# Patient Record
Sex: FEMALE | Race: WHITE | Employment: UNEMPLOYED | URBAN - NONMETROPOLITAN AREA
[De-identification: names, ages, dates, MRNs, and addresses within clinical notes are randomized per-mention and may not be internally consistent; named-entity substitution may affect disease eponyms.]

---

## 2018-02-13 ENCOUNTER — APPOINTMENT (OUTPATIENT)
Dept: CT IMAGING | Age: 38
End: 2018-02-13
Payer: COMMERCIAL

## 2018-02-13 ENCOUNTER — HOSPITAL ENCOUNTER (EMERGENCY)
Age: 38
Discharge: LEFT BEFORE DISCHARGE | End: 2018-02-13
Payer: COMMERCIAL

## 2018-02-13 VITALS
BODY MASS INDEX: 24.91 KG/M2 | TEMPERATURE: 98.1 F | RESPIRATION RATE: 17 BRPM | HEART RATE: 83 BPM | DIASTOLIC BLOOD PRESSURE: 76 MMHG | OXYGEN SATURATION: 99 % | WEIGHT: 155 LBS | SYSTOLIC BLOOD PRESSURE: 120 MMHG | HEIGHT: 66 IN

## 2018-02-13 DIAGNOSIS — R30.0 DYSURIA: Primary | ICD-10-CM

## 2018-02-13 DIAGNOSIS — R10.9 FLANK PAIN: ICD-10-CM

## 2018-02-13 LAB
BACTERIA: NEGATIVE /HPF
BILIRUBIN URINE: ABNORMAL
BLOOD, URINE: NEGATIVE
CLARITY: CLEAR
COLOR: YELLOW
EPITHELIAL CELLS, UA: 6 /HPF (ref 0–5)
GLUCOSE URINE: NEGATIVE MG/DL
HCG(URINE) PREGNANCY TEST: NEGATIVE
HYALINE CASTS: 3 /HPF (ref 0–8)
KETONES, URINE: NEGATIVE MG/DL
LEUKOCYTE ESTERASE, URINE: ABNORMAL
NITRITE, URINE: NEGATIVE
PH UA: 6
PROTEIN UA: NEGATIVE MG/DL
RBC UA: 4 /HPF (ref 0–4)
SPECIFIC GRAVITY UA: 1.02
UROBILINOGEN, URINE: 1 E.U./DL
WBC UA: 7 /HPF (ref 0–5)

## 2018-02-13 PROCEDURE — 99284 EMERGENCY DEPT VISIT MOD MDM: CPT | Performed by: NURSE PRACTITIONER

## 2018-02-13 PROCEDURE — 81001 URINALYSIS AUTO W/SCOPE: CPT

## 2018-02-13 PROCEDURE — 99284 EMERGENCY DEPT VISIT MOD MDM: CPT

## 2018-02-13 PROCEDURE — 81025 URINE PREGNANCY TEST: CPT

## 2018-02-13 RX ORDER — IBUPROFEN 400 MG/1
400 TABLET ORAL EVERY 6 HOURS PRN
Qty: 30 TABLET | Refills: 0 | Status: ON HOLD | OUTPATIENT
Start: 2018-02-13 | End: 2019-04-11 | Stop reason: HOSPADM

## 2018-02-13 RX ORDER — PHENAZOPYRIDINE HYDROCHLORIDE 200 MG/1
200 TABLET, FILM COATED ORAL 3 TIMES DAILY PRN
Qty: 9 TABLET | Refills: 0 | Status: SHIPPED | OUTPATIENT
Start: 2018-02-13 | End: 2018-02-16

## 2018-02-13 RX ORDER — CEPHALEXIN 500 MG/1
500 CAPSULE ORAL 3 TIMES DAILY
Qty: 30 CAPSULE | Refills: 0 | Status: SHIPPED | OUTPATIENT
Start: 2018-02-13 | End: 2018-02-23

## 2018-02-13 ASSESSMENT — ENCOUNTER SYMPTOMS
RESPIRATORY NEGATIVE: 1
ABDOMINAL PAIN: 1
NAUSEA: 1
BACK PAIN: 1

## 2018-02-13 ASSESSMENT — PAIN DESCRIPTION - LOCATION: LOCATION: FLANK

## 2018-02-13 ASSESSMENT — PAIN DESCRIPTION - ORIENTATION: ORIENTATION: RIGHT

## 2018-02-13 ASSESSMENT — PAIN SCALES - GENERAL: PAINLEVEL_OUTOF10: 8

## 2018-02-14 NOTE — ED PROVIDER NOTES
[facility]  eMERGENCY dEPARTMENT eNCOUnter      Pt Name: Jordan Borja  MRN: [id number]  [place] 1980  Date of evaluation: 2/13/2018  Provider: Adrián Ramos, [address]       Chief Complaint   Patient presents with    Urinary Tract Infection         HISTORY OF PRESENT ILLNESS   (Location/Symptom, Timing/Onset, Context/Setting, Quality, Duration, Modifying Factors, Severity)  Note limiting factors. Jordan Borja is a 30 y.o. female who presents to the emergency department for evaluation of dysuria. Pt tells me that she has had urinary frequency and urgency over past 10 days. She relates that she thought that she had a uti and took three days of an old prescription of levaquin. She relates that over past 2 days she has had worsening right sided flank pain described as intermittent cramping type pain. She has had subjective fever associated with nausea. She has had no vomiting or diarrhea. She has had no abnormal vaginal discharge. HPI    Nursing Notes were reviewed. REVIEW OF SYSTEMS    (2-9 systems for level 4, 10 or more for level 5)     Review of Systems   Constitutional: Positive for fever. Respiratory: Negative. Cardiovascular: Negative. Gastrointestinal: Positive for abdominal pain and nausea. Genitourinary: Positive for decreased urine volume and dysuria. Negative for vaginal discharge. Musculoskeletal: Positive for back pain. All other systems reviewed and are negative. A complete review of systems was performed and is negative except as noted above in the HPI.        PAST MEDICAL HISTORY     Past Medical History:   Diagnosis Date    Bipolar disorder ([address]     Depression     Liver disease     Hep C    Neuropathy          SURGICAL HISTORY       Past Surgical History:   Procedure Laterality Date    APPENDECTOMY           CURRENT MEDICATIONS       Previous Medications    CYCLOBENZAPRINE (FLEXERIL) 10 MG TABLET    Take 1 tablet by mouth 3 times daily Efforts were made to edit the dictations but occasionally words are mis-transcribed.)              Kumar Moore, APRN  02/13/18 3092

## 2018-09-29 ENCOUNTER — HOSPITAL ENCOUNTER (EMERGENCY)
Age: 38
Discharge: HOME | End: 2018-09-29
Payer: COMMERCIAL

## 2018-09-29 VITALS
DIASTOLIC BLOOD PRESSURE: 68 MMHG | TEMPERATURE: 98.7 F | WEIGHT: 155 LBS | HEART RATE: 102 BPM | OXYGEN SATURATION: 97 % | SYSTOLIC BLOOD PRESSURE: 105 MMHG | RESPIRATION RATE: 16 BRPM | HEIGHT: 66 IN | BODY MASS INDEX: 24.91 KG/M2

## 2018-09-29 DIAGNOSIS — M54.32 SCIATICA OF LEFT SIDE: Primary | ICD-10-CM

## 2018-09-29 PROCEDURE — 99284 EMERGENCY DEPT VISIT MOD MDM: CPT | Performed by: NURSE PRACTITIONER

## 2018-09-29 PROCEDURE — 99282 EMERGENCY DEPT VISIT SF MDM: CPT

## 2018-09-29 PROCEDURE — 6360000002 HC RX W HCPCS: Performed by: NURSE PRACTITIONER

## 2018-09-29 PROCEDURE — 96372 THER/PROPH/DIAG INJ SC/IM: CPT

## 2018-09-29 RX ORDER — ORPHENADRINE CITRATE 30 MG/ML
60 INJECTION INTRAMUSCULAR; INTRAVENOUS ONCE
Status: COMPLETED | OUTPATIENT
Start: 2018-09-29 | End: 2018-09-29

## 2018-09-29 RX ORDER — KETOROLAC TROMETHAMINE 30 MG/ML
30 INJECTION, SOLUTION INTRAMUSCULAR; INTRAVENOUS ONCE
Status: COMPLETED | OUTPATIENT
Start: 2018-09-29 | End: 2018-09-29

## 2018-09-29 RX ORDER — PREDNISONE 1 MG/1
20 TABLET ORAL 2 TIMES DAILY
Status: DISCONTINUED | OUTPATIENT
Start: 2018-09-29 | End: 2018-09-29

## 2018-09-29 RX ORDER — PREDNISONE 20 MG/1
20 TABLET ORAL 2 TIMES DAILY
Qty: 10 TABLET | Refills: 0 | Status: SHIPPED | OUTPATIENT
Start: 2018-09-29 | End: 2018-10-04

## 2018-09-29 RX ORDER — DEXAMETHASONE SODIUM PHOSPHATE 10 MG/ML
10 INJECTION, SOLUTION INTRAMUSCULAR; INTRAVENOUS ONCE
Status: COMPLETED | OUTPATIENT
Start: 2018-09-29 | End: 2018-09-29

## 2018-09-29 RX ADMIN — ORPHENADRINE CITRATE 60 MG: 30 INJECTION INTRAMUSCULAR; INTRAVENOUS at 21:27

## 2018-09-29 RX ADMIN — DEXAMETHASONE SODIUM PHOSPHATE 10 MG: 10 INJECTION INTRAMUSCULAR; INTRAVENOUS at 21:28

## 2018-09-29 RX ADMIN — KETOROLAC TROMETHAMINE 30 MG: 30 INJECTION, SOLUTION INTRAMUSCULAR; INTRAVENOUS at 21:27

## 2018-09-29 RX ADMIN — Medication 1 MG: at 22:28

## 2018-09-29 ASSESSMENT — PAIN SCALES - GENERAL
PAINLEVEL_OUTOF10: 8
PAINLEVEL_OUTOF10: 8

## 2018-09-29 ASSESSMENT — ENCOUNTER SYMPTOMS: BACK PAIN: 1

## 2018-10-05 ENCOUNTER — HOSPITAL ENCOUNTER (INPATIENT)
Facility: HOSPITAL | Age: 38
LOS: 3 days | Discharge: HOME | End: 2018-10-08
Attending: UROLOGY | Admitting: UROLOGY

## 2018-10-05 ENCOUNTER — HOSPITAL ENCOUNTER (EMERGENCY)
Age: 38
Discharge: ANOTHER HOSPITAL | End: 2018-10-05
Attending: EMERGENCY MEDICINE
Payer: COMMERCIAL

## 2018-10-05 ENCOUNTER — APPOINTMENT (OUTPATIENT)
Dept: CT IMAGING | Age: 38
End: 2018-10-05
Payer: COMMERCIAL

## 2018-10-05 VITALS
HEART RATE: 110 BPM | DIASTOLIC BLOOD PRESSURE: 75 MMHG | WEIGHT: 160 LBS | RESPIRATION RATE: 16 BRPM | HEIGHT: 66 IN | TEMPERATURE: 100.6 F | OXYGEN SATURATION: 97 % | SYSTOLIC BLOOD PRESSURE: 127 MMHG | BODY MASS INDEX: 25.71 KG/M2

## 2018-10-05 DIAGNOSIS — N13.2 HYDRONEPHROSIS WITH URINARY OBSTRUCTION DUE TO URETERAL CALCULUS: Primary | ICD-10-CM

## 2018-10-05 DIAGNOSIS — E87.6 HYPOKALEMIA: ICD-10-CM

## 2018-10-05 DIAGNOSIS — N10 ACUTE PYELONEPHRITIS: ICD-10-CM

## 2018-10-05 DIAGNOSIS — N12 PYELONEPHRITIS: ICD-10-CM

## 2018-10-05 LAB
ALBUMIN SERPL-MCNC: 3.4 G/DL (ref 3.5–5.2)
ALP BLD-CCNC: 132 U/L (ref 35–104)
ALT SERPL-CCNC: 17 U/L (ref 5–33)
ANION GAP SERPL CALCULATED.3IONS-SCNC: 14 MMOL/L (ref 7–19)
AST SERPL-CCNC: 22 U/L (ref 5–32)
BACTERIA: ABNORMAL /HPF
BASOPHILS ABSOLUTE: 0 K/UL (ref 0–0.2)
BASOPHILS RELATIVE PERCENT: 0.2 % (ref 0–1)
BILIRUB SERPL-MCNC: 0.4 MG/DL (ref 0.2–1.2)
BILIRUBIN URINE: ABNORMAL
BLOOD, URINE: NEGATIVE
BUN BLDV-MCNC: 12 MG/DL (ref 6–20)
CALCIUM SERPL-MCNC: 9.2 MG/DL (ref 8.6–10)
CHLORIDE BLD-SCNC: 92 MMOL/L (ref 98–111)
CLARITY: ABNORMAL
CO2: 27 MMOL/L (ref 22–29)
COLOR: ABNORMAL
CREAT SERPL-MCNC: 1.2 MG/DL (ref 0.5–0.9)
EOSINOPHILS ABSOLUTE: 0.2 K/UL (ref 0–0.6)
EOSINOPHILS RELATIVE PERCENT: 1.6 % (ref 0–5)
EPITHELIAL CELLS, UA: ABNORMAL /HPF
GFR NON-AFRICAN AMERICAN: 51
GLUCOSE BLD-MCNC: 115 MG/DL (ref 74–109)
GLUCOSE URINE: NEGATIVE MG/DL
HCG(URINE) PREGNANCY TEST: NEGATIVE
HCT VFR BLD CALC: 43 % (ref 37–47)
HEMOGLOBIN: 13.7 G/DL (ref 12–16)
KETONES, URINE: NEGATIVE MG/DL
LEUKOCYTE ESTERASE, URINE: ABNORMAL
LYMPHOCYTES ABSOLUTE: 1.5 K/UL (ref 1.1–4.5)
LYMPHOCYTES RELATIVE PERCENT: 13.4 % (ref 20–40)
MCH RBC QN AUTO: 29.1 PG (ref 27–31)
MCHC RBC AUTO-ENTMCNC: 31.9 G/DL (ref 33–37)
MCV RBC AUTO: 91.3 FL (ref 81–99)
MONOCYTES ABSOLUTE: 0.4 K/UL (ref 0–0.9)
MONOCYTES RELATIVE PERCENT: 3.7 % (ref 0–10)
NEUTROPHILS ABSOLUTE: 8.8 K/UL (ref 1.5–7.5)
NEUTROPHILS RELATIVE PERCENT: 79.5 % (ref 50–65)
NITRITE, URINE: NEGATIVE
PDW BLD-RTO: 12.8 % (ref 11.5–14.5)
PH UA: 6
PLATELET # BLD: 185 K/UL (ref 130–400)
PMV BLD AUTO: 9.3 FL (ref 9.4–12.3)
POTASSIUM SERPL-SCNC: 3.2 MMOL/L (ref 3.5–5)
PROTEIN UA: 100 MG/DL
RBC # BLD: 4.71 M/UL (ref 4.2–5.4)
SODIUM BLD-SCNC: 133 MMOL/L (ref 136–145)
SPECIFIC GRAVITY UA: 1.02
TOTAL PROTEIN: 7.2 G/DL (ref 6.6–8.7)
TRICHOMONAS: ABNORMAL /HPF
URINE REFLEX TO CULTURE: YES
UROBILINOGEN, URINE: 4 E.U./DL
WBC # BLD: 11.1 K/UL (ref 4.8–10.8)
WBC UA: ABNORMAL /HPF (ref 0–5)

## 2018-10-05 PROCEDURE — 6370000000 HC RX 637 (ALT 250 FOR IP): Performed by: EMERGENCY MEDICINE

## 2018-10-05 PROCEDURE — 25010000002 KETOROLAC TROMETHAMINE PER 15 MG: Performed by: UROLOGY

## 2018-10-05 PROCEDURE — 36415 COLL VENOUS BLD VENIPUNCTURE: CPT

## 2018-10-05 PROCEDURE — 99285 EMERGENCY DEPT VISIT HI MDM: CPT | Performed by: EMERGENCY MEDICINE

## 2018-10-05 PROCEDURE — 96365 THER/PROPH/DIAG IV INF INIT: CPT

## 2018-10-05 PROCEDURE — 87186 SC STD MICRODIL/AGAR DIL: CPT

## 2018-10-05 PROCEDURE — G0378 HOSPITAL OBSERVATION PER HR: HCPCS

## 2018-10-05 PROCEDURE — 87086 URINE CULTURE/COLONY COUNT: CPT

## 2018-10-05 PROCEDURE — 74150 CT ABDOMEN W/O CONTRAST: CPT

## 2018-10-05 PROCEDURE — 96375 TX/PRO/DX INJ NEW DRUG ADDON: CPT

## 2018-10-05 PROCEDURE — 81025 URINE PREGNANCY TEST: CPT

## 2018-10-05 PROCEDURE — 99285 EMERGENCY DEPT VISIT HI MDM: CPT

## 2018-10-05 PROCEDURE — 85025 COMPLETE CBC W/AUTO DIFF WBC: CPT

## 2018-10-05 PROCEDURE — 99223 1ST HOSP IP/OBS HIGH 75: CPT | Performed by: UROLOGY

## 2018-10-05 PROCEDURE — 2580000003 HC RX 258: Performed by: EMERGENCY MEDICINE

## 2018-10-05 PROCEDURE — 87040 BLOOD CULTURE FOR BACTERIA: CPT

## 2018-10-05 PROCEDURE — 80053 COMPREHEN METABOLIC PANEL: CPT

## 2018-10-05 PROCEDURE — 6360000002 HC RX W HCPCS: Performed by: EMERGENCY MEDICINE

## 2018-10-05 PROCEDURE — 81001 URINALYSIS AUTO W/SCOPE: CPT

## 2018-10-05 PROCEDURE — 96376 TX/PRO/DX INJ SAME DRUG ADON: CPT

## 2018-10-05 RX ORDER — ACETAMINOPHEN 500 MG
1000 TABLET ORAL ONCE
Status: COMPLETED | OUTPATIENT
Start: 2018-10-05 | End: 2018-10-05

## 2018-10-05 RX ORDER — OXYCODONE HYDROCHLORIDE AND ACETAMINOPHEN 5; 325 MG/1; MG/1
1 TABLET ORAL EVERY 4 HOURS PRN
Status: DISCONTINUED | OUTPATIENT
Start: 2018-10-05 | End: 2018-10-08 | Stop reason: HOSPADM

## 2018-10-05 RX ORDER — TAMSULOSIN HYDROCHLORIDE 0.4 MG/1
0.4 CAPSULE ORAL ONCE
Status: COMPLETED | OUTPATIENT
Start: 2018-10-05 | End: 2018-10-05

## 2018-10-05 RX ORDER — ZOLPIDEM TARTRATE 5 MG/1
5 TABLET ORAL NIGHTLY PRN
Status: DISCONTINUED | OUTPATIENT
Start: 2018-10-05 | End: 2018-10-08 | Stop reason: HOSPADM

## 2018-10-05 RX ORDER — SODIUM CHLORIDE 9 MG/ML
150 INJECTION, SOLUTION INTRAVENOUS CONTINUOUS
Status: DISCONTINUED | OUTPATIENT
Start: 2018-10-05 | End: 2018-10-08 | Stop reason: HOSPADM

## 2018-10-05 RX ORDER — HYDROMORPHONE HYDROCHLORIDE 1 MG/ML
0.5 INJECTION, SOLUTION INTRAMUSCULAR; INTRAVENOUS; SUBCUTANEOUS
Status: DISCONTINUED | OUTPATIENT
Start: 2018-10-05 | End: 2018-10-07 | Stop reason: RX

## 2018-10-05 RX ORDER — POTASSIUM CHLORIDE 7.45 MG/ML
10 INJECTION INTRAVENOUS ONCE
Status: COMPLETED | OUTPATIENT
Start: 2018-10-05 | End: 2018-10-05

## 2018-10-05 RX ORDER — ONDANSETRON 2 MG/ML
4 INJECTION INTRAMUSCULAR; INTRAVENOUS EVERY 4 HOURS PRN
Status: DISCONTINUED | OUTPATIENT
Start: 2018-10-05 | End: 2018-10-08 | Stop reason: HOSPADM

## 2018-10-05 RX ORDER — ACETAMINOPHEN 500 MG
1000 TABLET ORAL ONCE
Status: DISCONTINUED | OUTPATIENT
Start: 2018-10-05 | End: 2018-10-05

## 2018-10-05 RX ORDER — ONDANSETRON 2 MG/ML
4 INJECTION INTRAMUSCULAR; INTRAVENOUS ONCE
Status: COMPLETED | OUTPATIENT
Start: 2018-10-05 | End: 2018-10-05

## 2018-10-05 RX ORDER — 0.9 % SODIUM CHLORIDE 0.9 %
1000 INTRAVENOUS SOLUTION INTRAVENOUS ONCE
Status: COMPLETED | OUTPATIENT
Start: 2018-10-05 | End: 2018-10-05

## 2018-10-05 RX ORDER — KETOROLAC TROMETHAMINE 30 MG/ML
30 INJECTION, SOLUTION INTRAMUSCULAR; INTRAVENOUS EVERY 6 HOURS PRN
Status: DISCONTINUED | OUTPATIENT
Start: 2018-10-05 | End: 2018-10-08 | Stop reason: HOSPADM

## 2018-10-05 RX ORDER — OXYCODONE AND ACETAMINOPHEN 10; 325 MG/1; MG/1
1 TABLET ORAL EVERY 4 HOURS PRN
Status: DISCONTINUED | OUTPATIENT
Start: 2018-10-05 | End: 2018-10-08 | Stop reason: HOSPADM

## 2018-10-05 RX ADMIN — KETOROLAC TROMETHAMINE 30 MG: 30 INJECTION, SOLUTION INTRAMUSCULAR at 22:54

## 2018-10-05 RX ADMIN — ONDANSETRON HYDROCHLORIDE 4 MG: 2 INJECTION, SOLUTION INTRAMUSCULAR; INTRAVENOUS at 19:05

## 2018-10-05 RX ADMIN — CEFTRIAXONE 1 G: 1 INJECTION, POWDER, FOR SOLUTION INTRAMUSCULAR; INTRAVENOUS at 20:01

## 2018-10-05 RX ADMIN — HYDROMORPHONE HYDROCHLORIDE 1 MG: 1 INJECTION, SOLUTION INTRAMUSCULAR; INTRAVENOUS; SUBCUTANEOUS at 22:57

## 2018-10-05 RX ADMIN — SODIUM CHLORIDE 1000 ML: 9 INJECTION, SOLUTION INTRAVENOUS at 19:06

## 2018-10-05 RX ADMIN — POTASSIUM CHLORIDE 10 MEQ: 7.46 INJECTION, SOLUTION INTRAVENOUS at 20:19

## 2018-10-05 RX ADMIN — SODIUM CHLORIDE 150 ML/HR: 9 INJECTION, SOLUTION INTRAVENOUS at 22:55

## 2018-10-05 RX ADMIN — Medication 1 MG: at 19:06

## 2018-10-05 RX ADMIN — Medication 1 MG: at 19:48

## 2018-10-05 RX ADMIN — ACETAMINOPHEN 1000 MG: 500 TABLET, FILM COATED ORAL at 20:00

## 2018-10-05 RX ADMIN — TAMSULOSIN HYDROCHLORIDE 0.4 MG: 0.4 CAPSULE ORAL at 23:20

## 2018-10-05 ASSESSMENT — PAIN DESCRIPTION - LOCATION: LOCATION: FLANK

## 2018-10-05 ASSESSMENT — PAIN SCALES - GENERAL
PAINLEVEL_OUTOF10: 7
PAINLEVEL_OUTOF10: 8
PAINLEVEL_OUTOF10: 6
PAINLEVEL_OUTOF10: 8
PAINLEVEL_OUTOF10: 8

## 2018-10-05 ASSESSMENT — ENCOUNTER SYMPTOMS
DIARRHEA: 0
SHORTNESS OF BREATH: 0
NAUSEA: 1
VOMITING: 1
CHEST TIGHTNESS: 0
ABDOMINAL PAIN: 1

## 2018-10-05 ASSESSMENT — PAIN DESCRIPTION - ORIENTATION: ORIENTATION: RIGHT

## 2018-10-05 ASSESSMENT — PAIN DESCRIPTION - DESCRIPTORS: DESCRIPTORS: SHARP

## 2018-10-05 ASSESSMENT — PAIN - FUNCTIONAL ASSESSMENT: PAIN_FUNCTIONAL_ASSESSMENT: 0-10

## 2018-10-05 NOTE — ED NOTES
Assessment:    Pt respirations even and unlabored, skin color within normal limits. Skin is warm and dry. Capillary refill less than 2 seconds. Left flank pain and tenderness. Bowel sounds within normal limits. Abdomen soft and nontender. Alert and oriented x 4. Pt is in no acute distress.          Janice Huang RN  10/05/18 4015

## 2018-10-06 ENCOUNTER — ANESTHESIA (OUTPATIENT)
Dept: PERIOP | Facility: HOSPITAL | Age: 38
End: 2018-10-06

## 2018-10-06 ENCOUNTER — APPOINTMENT (OUTPATIENT)
Dept: GENERAL RADIOLOGY | Facility: HOSPITAL | Age: 38
End: 2018-10-06

## 2018-10-06 ENCOUNTER — ANESTHESIA EVENT (OUTPATIENT)
Dept: PERIOP | Facility: HOSPITAL | Age: 38
End: 2018-10-06

## 2018-10-06 PROBLEM — B19.20 VIRAL HEPATITIS C: Status: ACTIVE | Noted: 2018-10-06

## 2018-10-06 PROBLEM — N13.2 HYDRONEPHROSIS WITH URINARY OBSTRUCTION DUE TO URETERAL CALCULUS: Status: ACTIVE | Noted: 2018-10-06

## 2018-10-06 PROBLEM — F31.9 BIPOLAR DISORDER (HCC): Status: ACTIVE | Noted: 2018-10-06

## 2018-10-06 PROBLEM — F17.200 TOBACCO DEPENDENCE SYNDROME: Status: ACTIVE | Noted: 2018-06-14

## 2018-10-06 LAB
ALBUMIN SERPL-MCNC: 2.8 G/DL (ref 3.5–5)
ALBUMIN/GLOB SERPL: 0.9 G/DL (ref 1.1–2.5)
ALP SERPL-CCNC: 186 U/L (ref 24–120)
ALT SERPL W P-5'-P-CCNC: 26 U/L (ref 0–54)
ANION GAP SERPL CALCULATED.3IONS-SCNC: 9 MMOL/L (ref 4–13)
AST SERPL-CCNC: 26 U/L (ref 7–45)
BILIRUB SERPL-MCNC: 0.7 MG/DL (ref 0.1–1)
BUN BLD-MCNC: 13 MG/DL (ref 5–21)
BUN/CREAT SERPL: 11.2 (ref 7–25)
CALCIUM SPEC-SCNC: 7.9 MG/DL (ref 8.4–10.4)
CHLORIDE SERPL-SCNC: 107 MMOL/L (ref 98–110)
CO2 SERPL-SCNC: 24 MMOL/L (ref 24–31)
CREAT BLD-MCNC: 1.16 MG/DL (ref 0.5–1.4)
DEPRECATED RDW RBC AUTO: 42.3 FL (ref 40–54)
EOSINOPHIL # BLD MANUAL: 0.04 10*3/MM3 (ref 0–0.7)
EOSINOPHIL NFR BLD MANUAL: 1 % (ref 0–4)
ERYTHROCYTE [DISTWIDTH] IN BLOOD BY AUTOMATED COUNT: 12.8 % (ref 12–15)
GFR SERPL CREATININE-BSD FRML MDRD: 53 ML/MIN/1.73
GLOBULIN UR ELPH-MCNC: 3.1 GM/DL
GLUCOSE BLD-MCNC: 77 MG/DL (ref 70–100)
HCG SERPL QL: NEGATIVE
HCT VFR BLD AUTO: 35.3 % (ref 37–47)
HGB BLD-MCNC: 11.8 G/DL (ref 12–16)
LYMPHOCYTES # BLD MANUAL: 1.04 10*3/MM3 (ref 0.72–4.86)
LYMPHOCYTES NFR BLD MANUAL: 1 % (ref 4–12)
LYMPHOCYTES NFR BLD MANUAL: 25 % (ref 15–45)
MCH RBC QN AUTO: 29.9 PG (ref 28–32)
MCHC RBC AUTO-ENTMCNC: 33.4 G/DL (ref 33–36)
MCV RBC AUTO: 89.4 FL (ref 82–98)
MONOCYTES # BLD AUTO: 0.04 10*3/MM3 (ref 0.19–1.3)
NEUTROPHILS # BLD AUTO: 3.04 10*3/MM3 (ref 1.87–8.4)
NEUTROPHILS NFR BLD MANUAL: 61 % (ref 39–78)
NEUTS BAND NFR BLD MANUAL: 12 % (ref 0–10)
PLAT MORPH BLD: NORMAL
PLATELET # BLD AUTO: 132 10*3/MM3 (ref 130–400)
PMV BLD AUTO: 9.8 FL (ref 6–12)
POTASSIUM BLD-SCNC: 3.5 MMOL/L (ref 3.5–5.3)
PROT SERPL-MCNC: 5.9 G/DL (ref 6.3–8.7)
RBC # BLD AUTO: 3.95 10*6/MM3 (ref 4.2–5.4)
RBC MORPH BLD: NORMAL
SODIUM BLD-SCNC: 140 MMOL/L (ref 135–145)
URATE SERPL-MCNC: 4.3 MG/DL (ref 2.7–7.5)
WBC MORPH BLD: NORMAL
WBC NRBC COR # BLD: 4.16 10*3/MM3 (ref 4.8–10.8)

## 2018-10-06 PROCEDURE — 25010000002 FENTANYL CITRATE (PF) 100 MCG/2ML SOLUTION: Performed by: NURSE ANESTHETIST, CERTIFIED REGISTERED

## 2018-10-06 PROCEDURE — 84550 ASSAY OF BLOOD/URIC ACID: CPT | Performed by: UROLOGY

## 2018-10-06 PROCEDURE — C2617 STENT, NON-COR, TEM W/O DEL: HCPCS | Performed by: UROLOGY

## 2018-10-06 PROCEDURE — 84703 CHORIONIC GONADOTROPIN ASSAY: CPT | Performed by: UROLOGY

## 2018-10-06 PROCEDURE — 87186 SC STD MICRODIL/AGAR DIL: CPT | Performed by: UROLOGY

## 2018-10-06 PROCEDURE — 25010000002 PROPOFOL 10 MG/ML EMULSION: Performed by: NURSE ANESTHETIST, CERTIFIED REGISTERED

## 2018-10-06 PROCEDURE — 87077 CULTURE AEROBIC IDENTIFY: CPT | Performed by: UROLOGY

## 2018-10-06 PROCEDURE — 25010000002 KETOROLAC TROMETHAMINE PER 15 MG: Performed by: UROLOGY

## 2018-10-06 PROCEDURE — 74420 UROGRAPHY RTRGR +-KUB: CPT

## 2018-10-06 PROCEDURE — 25010000002 IOPAMIDOL 61 % SOLUTION: Performed by: UROLOGY

## 2018-10-06 PROCEDURE — 85025 COMPLETE CBC W/AUTO DIFF WBC: CPT | Performed by: UROLOGY

## 2018-10-06 PROCEDURE — 25010000002 KETOROLAC TROMETHAMINE PER 15 MG: Performed by: NURSE ANESTHETIST, CERTIFIED REGISTERED

## 2018-10-06 PROCEDURE — 87086 URINE CULTURE/COLONY COUNT: CPT | Performed by: UROLOGY

## 2018-10-06 PROCEDURE — C1758 CATHETER, URETERAL: HCPCS | Performed by: UROLOGY

## 2018-10-06 PROCEDURE — 52351 CYSTOURETERO & OR PYELOSCOPE: CPT | Performed by: UROLOGY

## 2018-10-06 PROCEDURE — 85007 BL SMEAR W/DIFF WBC COUNT: CPT | Performed by: UROLOGY

## 2018-10-06 PROCEDURE — 0T778DZ DILATION OF LEFT URETER WITH INTRALUMINAL DEVICE, VIA NATURAL OR ARTIFICIAL OPENING ENDOSCOPIC: ICD-10-PCS | Performed by: UROLOGY

## 2018-10-06 PROCEDURE — 25010000002 CEFTRIAXONE PER 250 MG: Performed by: UROLOGY

## 2018-10-06 PROCEDURE — BT1F1ZZ FLUOROSCOPY OF LEFT KIDNEY, URETER AND BLADDER USING LOW OSMOLAR CONTRAST: ICD-10-PCS | Performed by: UROLOGY

## 2018-10-06 PROCEDURE — 25010000002 HYDROMORPHONE PER 4 MG: Performed by: UROLOGY

## 2018-10-06 PROCEDURE — 25010000002 MIDAZOLAM PER 1 MG: Performed by: NURSE ANESTHETIST, CERTIFIED REGISTERED

## 2018-10-06 PROCEDURE — 25010000002 NEOSTIGMINE PER 0.5 MG: Performed by: NURSE ANESTHETIST, CERTIFIED REGISTERED

## 2018-10-06 PROCEDURE — 52332 CYSTOSCOPY AND TREATMENT: CPT | Performed by: UROLOGY

## 2018-10-06 PROCEDURE — 25010000002 ONDANSETRON PER 1 MG: Performed by: NURSE ANESTHETIST, CERTIFIED REGISTERED

## 2018-10-06 PROCEDURE — 80053 COMPREHEN METABOLIC PANEL: CPT | Performed by: UROLOGY

## 2018-10-06 PROCEDURE — 25010000002 DEXAMETHASONE PER 1 MG: Performed by: NURSE ANESTHETIST, CERTIFIED REGISTERED

## 2018-10-06 PROCEDURE — 74420 UROGRAPHY RTRGR +-KUB: CPT | Performed by: UROLOGY

## 2018-10-06 DEVICE — URETERAL STENT
Type: IMPLANTABLE DEVICE | Site: URETER | Status: FUNCTIONAL
Brand: PERCUFLEX™ PLUS

## 2018-10-06 RX ORDER — MAGNESIUM HYDROXIDE 1200 MG/15ML
LIQUID ORAL AS NEEDED
Status: DISCONTINUED | OUTPATIENT
Start: 2018-10-06 | End: 2018-10-06 | Stop reason: HOSPADM

## 2018-10-06 RX ORDER — FENTANYL CITRATE 50 UG/ML
25 INJECTION, SOLUTION INTRAMUSCULAR; INTRAVENOUS AS NEEDED
Status: DISCONTINUED | OUTPATIENT
Start: 2018-10-06 | End: 2018-10-06

## 2018-10-06 RX ORDER — DEXAMETHASONE SODIUM PHOSPHATE 4 MG/ML
INJECTION, SOLUTION INTRA-ARTICULAR; INTRALESIONAL; INTRAMUSCULAR; INTRAVENOUS; SOFT TISSUE AS NEEDED
Status: DISCONTINUED | OUTPATIENT
Start: 2018-10-06 | End: 2018-10-06 | Stop reason: SURG

## 2018-10-06 RX ORDER — ARIPIPRAZOLE 5 MG/1
5 TABLET ORAL NIGHTLY
COMMUNITY
End: 2020-04-15

## 2018-10-06 RX ORDER — NALOXONE HCL 0.4 MG/ML
0.04 VIAL (ML) INJECTION AS NEEDED
Status: DISCONTINUED | OUTPATIENT
Start: 2018-10-06 | End: 2018-10-06

## 2018-10-06 RX ORDER — FLUOXETINE HYDROCHLORIDE 20 MG/1
40 CAPSULE ORAL DAILY
Status: DISCONTINUED | OUTPATIENT
Start: 2018-10-06 | End: 2018-10-08 | Stop reason: HOSPADM

## 2018-10-06 RX ORDER — ONDANSETRON 2 MG/ML
INJECTION INTRAMUSCULAR; INTRAVENOUS AS NEEDED
Status: DISCONTINUED | OUTPATIENT
Start: 2018-10-06 | End: 2018-10-06 | Stop reason: SURG

## 2018-10-06 RX ORDER — GABAPENTIN 400 MG/1
400 CAPSULE ORAL 3 TIMES DAILY
COMMUNITY
Start: 2016-03-03 | End: 2020-04-15

## 2018-10-06 RX ORDER — IPRATROPIUM BROMIDE AND ALBUTEROL SULFATE 2.5; .5 MG/3ML; MG/3ML
3 SOLUTION RESPIRATORY (INHALATION) ONCE AS NEEDED
Status: DISCONTINUED | OUTPATIENT
Start: 2018-10-06 | End: 2018-10-06

## 2018-10-06 RX ORDER — GABAPENTIN 300 MG/1
600 CAPSULE ORAL NIGHTLY
Status: DISCONTINUED | OUTPATIENT
Start: 2018-10-06 | End: 2018-10-06

## 2018-10-06 RX ORDER — LEVONORGESTREL AND ETHINYL ESTRADIOL 0.1-0.02MG
1 KIT ORAL DAILY
COMMUNITY
Start: 2016-03-02 | End: 2020-03-31 | Stop reason: HOSPADM

## 2018-10-06 RX ORDER — SODIUM CHLORIDE, SODIUM LACTATE, POTASSIUM CHLORIDE, CALCIUM CHLORIDE 600; 310; 30; 20 MG/100ML; MG/100ML; MG/100ML; MG/100ML
INJECTION, SOLUTION INTRAVENOUS CONTINUOUS PRN
Status: DISCONTINUED | OUTPATIENT
Start: 2018-10-06 | End: 2018-10-06 | Stop reason: SURG

## 2018-10-06 RX ORDER — PHENYLEPHRINE HCL IN 0.9% NACL 0.8MG/10ML
SYRINGE (ML) INTRAVENOUS AS NEEDED
Status: DISCONTINUED | OUTPATIENT
Start: 2018-10-06 | End: 2018-10-06 | Stop reason: SURG

## 2018-10-06 RX ORDER — SODIUM CHLORIDE, SODIUM LACTATE, POTASSIUM CHLORIDE, CALCIUM CHLORIDE 600; 310; 30; 20 MG/100ML; MG/100ML; MG/100ML; MG/100ML
100 INJECTION, SOLUTION INTRAVENOUS CONTINUOUS
Status: CANCELLED | OUTPATIENT
Start: 2018-10-06

## 2018-10-06 RX ORDER — ARIPIPRAZOLE 5 MG/1
5 TABLET ORAL NIGHTLY
Status: DISCONTINUED | OUTPATIENT
Start: 2018-10-06 | End: 2018-10-08 | Stop reason: HOSPADM

## 2018-10-06 RX ORDER — KETOROLAC TROMETHAMINE 30 MG/ML
INJECTION, SOLUTION INTRAMUSCULAR; INTRAVENOUS AS NEEDED
Status: DISCONTINUED | OUTPATIENT
Start: 2018-10-06 | End: 2018-10-06 | Stop reason: SURG

## 2018-10-06 RX ORDER — SODIUM CHLORIDE 0.9 % (FLUSH) 0.9 %
1-10 SYRINGE (ML) INJECTION AS NEEDED
Status: CANCELLED | OUTPATIENT
Start: 2018-10-06

## 2018-10-06 RX ORDER — METOCLOPRAMIDE HYDROCHLORIDE 5 MG/ML
5 INJECTION INTRAMUSCULAR; INTRAVENOUS
Status: DISCONTINUED | OUTPATIENT
Start: 2018-10-06 | End: 2018-10-06

## 2018-10-06 RX ORDER — SODIUM CHLORIDE 0.9 % (FLUSH) 0.9 %
3 SYRINGE (ML) INJECTION EVERY 12 HOURS SCHEDULED
Status: CANCELLED | OUTPATIENT
Start: 2018-10-06

## 2018-10-06 RX ORDER — FLUOXETINE HYDROCHLORIDE 20 MG/1
40 CAPSULE ORAL EVERY 12 HOURS SCHEDULED
Status: DISCONTINUED | OUTPATIENT
Start: 2018-10-06 | End: 2018-10-06 | Stop reason: SDUPTHER

## 2018-10-06 RX ORDER — FLUMAZENIL 0.1 MG/ML
0.2 INJECTION INTRAVENOUS AS NEEDED
Status: DISCONTINUED | OUTPATIENT
Start: 2018-10-06 | End: 2018-10-06

## 2018-10-06 RX ORDER — MIDAZOLAM HYDROCHLORIDE 1 MG/ML
INJECTION INTRAMUSCULAR; INTRAVENOUS AS NEEDED
Status: DISCONTINUED | OUTPATIENT
Start: 2018-10-06 | End: 2018-10-06 | Stop reason: SURG

## 2018-10-06 RX ORDER — ROCURONIUM BROMIDE 10 MG/ML
INJECTION, SOLUTION INTRAVENOUS AS NEEDED
Status: DISCONTINUED | OUTPATIENT
Start: 2018-10-06 | End: 2018-10-06 | Stop reason: SURG

## 2018-10-06 RX ORDER — HYDRALAZINE HYDROCHLORIDE 20 MG/ML
5 INJECTION INTRAMUSCULAR; INTRAVENOUS
Status: DISCONTINUED | OUTPATIENT
Start: 2018-10-06 | End: 2018-10-06

## 2018-10-06 RX ORDER — LAMOTRIGINE 150 MG/1
150 TABLET ORAL 2 TIMES DAILY
COMMUNITY
Start: 2016-03-02 | End: 2020-04-15

## 2018-10-06 RX ORDER — GLYCOPYRROLATE 0.2 MG/ML
INJECTION INTRAMUSCULAR; INTRAVENOUS AS NEEDED
Status: DISCONTINUED | OUTPATIENT
Start: 2018-10-06 | End: 2018-10-06 | Stop reason: SURG

## 2018-10-06 RX ORDER — LIDOCAINE HYDROCHLORIDE 20 MG/ML
INJECTION, SOLUTION INFILTRATION; PERINEURAL AS NEEDED
Status: DISCONTINUED | OUTPATIENT
Start: 2018-10-06 | End: 2018-10-06 | Stop reason: SURG

## 2018-10-06 RX ORDER — GABAPENTIN 400 MG/1
400 CAPSULE ORAL 3 TIMES DAILY
Status: DISCONTINUED | OUTPATIENT
Start: 2018-10-06 | End: 2018-10-08 | Stop reason: HOSPADM

## 2018-10-06 RX ORDER — OXYCODONE AND ACETAMINOPHEN 10; 325 MG/1; MG/1
1 TABLET ORAL ONCE AS NEEDED
Status: COMPLETED | OUTPATIENT
Start: 2018-10-06 | End: 2018-10-06

## 2018-10-06 RX ORDER — PROPOFOL 10 MG/ML
VIAL (ML) INTRAVENOUS AS NEEDED
Status: DISCONTINUED | OUTPATIENT
Start: 2018-10-06 | End: 2018-10-06 | Stop reason: SURG

## 2018-10-06 RX ORDER — MORPHINE SULFATE 2 MG/ML
2 INJECTION, SOLUTION INTRAMUSCULAR; INTRAVENOUS
Status: DISCONTINUED | OUTPATIENT
Start: 2018-10-06 | End: 2018-10-06

## 2018-10-06 RX ORDER — MEPERIDINE HYDROCHLORIDE 25 MG/ML
12.5 INJECTION INTRAMUSCULAR; INTRAVENOUS; SUBCUTANEOUS
Status: DISCONTINUED | OUTPATIENT
Start: 2018-10-06 | End: 2018-10-06

## 2018-10-06 RX ORDER — ONDANSETRON 2 MG/ML
4 INJECTION INTRAMUSCULAR; INTRAVENOUS AS NEEDED
Status: DISCONTINUED | OUTPATIENT
Start: 2018-10-06 | End: 2018-10-06

## 2018-10-06 RX ORDER — FENTANYL CITRATE 50 UG/ML
INJECTION, SOLUTION INTRAMUSCULAR; INTRAVENOUS AS NEEDED
Status: DISCONTINUED | OUTPATIENT
Start: 2018-10-06 | End: 2018-10-06 | Stop reason: SURG

## 2018-10-06 RX ORDER — LABETALOL HYDROCHLORIDE 5 MG/ML
5 INJECTION, SOLUTION INTRAVENOUS
Status: DISCONTINUED | OUTPATIENT
Start: 2018-10-06 | End: 2018-10-06

## 2018-10-06 RX ORDER — LAMOTRIGINE 100 MG/1
100 TABLET ORAL 2 TIMES DAILY
Status: DISCONTINUED | OUTPATIENT
Start: 2018-10-06 | End: 2018-10-06

## 2018-10-06 RX ORDER — FLUOXETINE HYDROCHLORIDE 40 MG/1
40 CAPSULE ORAL DAILY
COMMUNITY
Start: 2016-03-02

## 2018-10-06 RX ORDER — LIDOCAINE HYDROCHLORIDE 40 MG/ML
SOLUTION TOPICAL AS NEEDED
Status: DISCONTINUED | OUTPATIENT
Start: 2018-10-06 | End: 2018-10-06 | Stop reason: SURG

## 2018-10-06 RX ADMIN — GABAPENTIN 400 MG: 400 CAPSULE ORAL at 17:53

## 2018-10-06 RX ADMIN — GABAPENTIN 400 MG: 400 CAPSULE ORAL at 21:33

## 2018-10-06 RX ADMIN — ROCURONIUM BROMIDE 10 MG: 10 INJECTION INTRAVENOUS at 09:28

## 2018-10-06 RX ADMIN — SODIUM CHLORIDE 150 ML/HR: 9 INJECTION, SOLUTION INTRAVENOUS at 05:26

## 2018-10-06 RX ADMIN — LAMOTRIGINE 100 MG: 100 TABLET ORAL at 11:34

## 2018-10-06 RX ADMIN — ROCURONIUM BROMIDE 20 MG: 10 INJECTION INTRAVENOUS at 09:29

## 2018-10-06 RX ADMIN — FENTANYL CITRATE 100 MCG: 50 INJECTION, SOLUTION INTRAMUSCULAR; INTRAVENOUS at 09:28

## 2018-10-06 RX ADMIN — OXYCODONE HYDROCHLORIDE AND ACETAMINOPHEN 1 TABLET: 10; 325 TABLET ORAL at 18:16

## 2018-10-06 RX ADMIN — ONDANSETRON HYDROCHLORIDE 4 MG: 2 SOLUTION INTRAMUSCULAR; INTRAVENOUS at 09:50

## 2018-10-06 RX ADMIN — KETOROLAC TROMETHAMINE 30 MG: 30 INJECTION, SOLUTION INTRAMUSCULAR at 09:55

## 2018-10-06 RX ADMIN — Medication 80 MCG: at 09:33

## 2018-10-06 RX ADMIN — DEXAMETHASONE SODIUM PHOSPHATE 8 MG: 4 INJECTION, SOLUTION INTRAMUSCULAR; INTRAVENOUS at 09:50

## 2018-10-06 RX ADMIN — GLYCOPYRROLATE 0.3 MG: 0.2 INJECTION, SOLUTION INTRAMUSCULAR; INTRAVENOUS at 09:50

## 2018-10-06 RX ADMIN — MIDAZOLAM HYDROCHLORIDE 3 MG: 1 INJECTION, SOLUTION INTRAMUSCULAR; INTRAVENOUS at 09:21

## 2018-10-06 RX ADMIN — Medication 160 MCG: at 09:36

## 2018-10-06 RX ADMIN — ZOLPIDEM TARTRATE 5 MG: 5 TABLET, FILM COATED ORAL at 21:33

## 2018-10-06 RX ADMIN — LIDOCAINE HYDROCHLORIDE 1 EACH: 40 SOLUTION TOPICAL at 09:30

## 2018-10-06 RX ADMIN — Medication 160 MCG: at 09:47

## 2018-10-06 RX ADMIN — OXYCODONE HYDROCHLORIDE AND ACETAMINOPHEN 1 TABLET: 10; 325 TABLET ORAL at 14:19

## 2018-10-06 RX ADMIN — KETOROLAC TROMETHAMINE 30 MG: 30 INJECTION, SOLUTION INTRAMUSCULAR at 21:34

## 2018-10-06 RX ADMIN — ARIPIPRAZOLE 5 MG: 5 TABLET ORAL at 21:35

## 2018-10-06 RX ADMIN — FLUOXETINE HYDROCHLORIDE 40 MG: 20 CAPSULE ORAL at 11:34

## 2018-10-06 RX ADMIN — CEFTRIAXONE SODIUM 1 G: 1 INJECTION, POWDER, FOR SOLUTION INTRAMUSCULAR; INTRAVENOUS at 17:53

## 2018-10-06 RX ADMIN — Medication 3 MG: at 09:50

## 2018-10-06 RX ADMIN — Medication 160 MCG: at 09:50

## 2018-10-06 RX ADMIN — OXYCODONE HYDROCHLORIDE AND ACETAMINOPHEN 1 TABLET: 10; 325 TABLET ORAL at 10:17

## 2018-10-06 RX ADMIN — Medication 160 MCG: at 09:40

## 2018-10-06 RX ADMIN — PROPOFOL 150 MG: 10 INJECTION, EMULSION INTRAVENOUS at 09:28

## 2018-10-06 RX ADMIN — HYDROMORPHONE HYDROCHLORIDE 0.5 MG: 1 INJECTION, SOLUTION INTRAMUSCULAR; INTRAVENOUS; SUBCUTANEOUS at 05:26

## 2018-10-06 RX ADMIN — KETOROLAC TROMETHAMINE 30 MG: 30 INJECTION, SOLUTION INTRAMUSCULAR at 03:52

## 2018-10-06 RX ADMIN — SODIUM CHLORIDE 150 ML/HR: 9 INJECTION, SOLUTION INTRAVENOUS at 17:53

## 2018-10-06 RX ADMIN — SODIUM CHLORIDE, POTASSIUM CHLORIDE, SODIUM LACTATE AND CALCIUM CHLORIDE: 600; 310; 30; 20 INJECTION, SOLUTION INTRAVENOUS at 09:20

## 2018-10-06 RX ADMIN — Medication 160 MCG: at 09:43

## 2018-10-06 RX ADMIN — LAMOTRIGINE 150 MG: 100 TABLET ORAL at 21:33

## 2018-10-06 RX ADMIN — LIDOCAINE HYDROCHLORIDE 60 MG: 20 INJECTION, SOLUTION INFILTRATION; PERINEURAL at 09:28

## 2018-10-06 RX ADMIN — MIDAZOLAM HYDROCHLORIDE 2 MG: 1 INJECTION, SOLUTION INTRAMUSCULAR; INTRAVENOUS at 09:25

## 2018-10-06 NOTE — PROGRESS NOTES
Discharge Planning Assessment  [facility]     Patient Name: Arin De La Cruz  MRN: [id number]  Today's Date: 10/6/2018    Admit Date: 10/5/2018          Discharge Needs Assessment     Row Name 10/06/18 1338       Living Environment    Lives With alone    Current Living Arrangements home/apartment/condo    Primary Care Provided by self    Provides Primary Care For no one    Family Caregiver if Needed friend(s)    Quality of Family Relationships helpful;involved;supportive    Able to Return to Prior Arrangements yes       Resource/Environmental Concerns    Resource/Environmental Concerns none       Transition Planning    Patient/Family Anticipates Transition to home    Patient/Family Anticipated Services at Transition none    Transportation Anticipated family or friend will provide       Discharge Needs Assessment    Readmission Within the Last 30 Days no previous admission in last 30 days    Concerns to be Addressed no discharge needs identified    Equipment Currently Used at Home none    Anticipated Changes Related to Illness none    Equipment Needed After Discharge none    Discharge Coordination/Progress Pt has PCP and RX coverage.  Pt denies any dc needs.            Discharge Plan    No documentation.       Destination     No service coordination in this encounter.      Durable Medical Equipment     No service coordination in this encounter.      Dialysis/Infusion     No service coordination in this encounter.      Home Medical Care     No service coordination in this encounter.      Social Care     No service coordination in this encounter.                Demographic Summary    No documentation.           Functional Status    No documentation.           Psychosocial    No documentation.           Abuse/Neglect    No documentation.           Legal    No documentation.           Substance Abuse    No documentation.           Patient Forms    No documentation.         KAN Cedeno

## 2018-10-06 NOTE — PLAN OF CARE
Problem: Patient Care Overview  Goal: Plan of Care Review  Outcome: Ongoing (interventions implemented as appropriate)   10/06/18 0411   Coping/Psychosocial   Plan of Care Reviewed With patient   Plan of Care Review   Progress no change   OTHER   Outcome Summary Stent placement this a.m.     Goal: Individualization and Mutuality  Outcome: Ongoing (interventions implemented as appropriate)    Goal: Discharge Needs Assessment  Outcome: Ongoing (interventions implemented as appropriate)    Goal: Interprofessional Rounds/Family Conf  Outcome: Ongoing (interventions implemented as appropriate)      Problem: Pain, Acute (Adult)  Goal: Identify Related Risk Factors and Signs and Symptoms  Outcome: Ongoing (interventions implemented as appropriate)    Goal: Acceptable Pain Control/Comfort Level  Outcome: Ongoing (interventions implemented as appropriate)

## 2018-10-06 NOTE — ED PROVIDER NOTES
syncope. All other systems reviewed and are negative. PAST MEDICAL HISTORY     Past Medical History:   Diagnosis Date    Bipolar disorder ([facility] [address]     Depression     Liver disease     Hep C    Neuropathy          SURGICAL HISTORY       Past Surgical History:   Procedure Laterality Date    APPENDECTOMY           CURRENT MEDICATIONS       Previous Medications    CYCLOBENZAPRINE (FLEXERIL) 10 MG TABLET    Take 1 tablet by mouth 3 times daily as needed for Muscle spasms    FLUOXETINE (PROZAC) 40 MG CAPSULE        GABAPENTIN (NEURONTIN) 600 MG TABLET        IBUPROFEN (ADVIL;MOTRIN) 400 MG TABLET    Take 1 tablet by mouth every 6 hours as needed for Pain    LAMOTRIGINE (LAMICTAL) 100 MG TABLET        LUTERA 0.1-20 MG-MCG PER TABLET        ONDANSETRON (ZOFRAN) 4 MG TABLET    Take 1 tablet by mouth every 12 hours as needed for Nausea       ALLERGIES     Patient has no known allergies. FAMILY HISTORY       Family History   Problem Relation Age of Onset    Liver Disease Mother         Hep C    Colon Cancer Neg Hx     Colon Polyps Neg Hx     Esophageal Cancer Neg Hx     Liver Cancer Neg Hx     Rectal Cancer Neg Hx     Stomach Cancer Neg Hx           SOCIAL HISTORY       Social History     Social History    Marital status:      Spouse name: N/A    Number of children: N/A    Years of education: N/A     Social History Main Topics    Smoking status: Current Every Day Smoker     Packs/day: 1.00     Years: 18.00    Smokeless tobacco: Never Used    Alcohol use No    Drug use: Yes     Types: Marijuana, Cocaine, IV, Methamphetamines      Comment: Lasted used 11/12/2014- Completed Rehab ([facility]) 11/16/2015.     Sexual activity: Not Asked     Other Topics Concern    None     Social History Narrative    None       SCREENINGS             PHYSICAL EXAM    (up to 7 for level 4, 8 or more for level 5)     ED Triage Vitals [10/05/18 1831]   BP Temp Temp Source Pulse Resp SpO2 Height Weight   122/80

## 2018-10-06 NOTE — OP NOTE
Operative Summary    Arin De La Cruz  Date of Procedure: 10/5/2018 - 10/6/2018    Pre-op Diagnosis:   -Left ureteral obstruction with hydronephrosis secondary to a left proximal ureteral calculus.  -Left acute pyelonephritis    Post-op Diagnosis:     Same    Procedure/CPT® Codes:      Procedure(s):  CYSTOSCOPY  LEFT RETROGRADE URETEROPYELOGRAM   LEFT URETERAL STENT INSERTION    Surgeon(s):  Tyrone Machuca MD    Anesthesia: General    Staff:   Circulator: Heidy Gallardo RN  Radiology Technologist: Keri Krishna  Scrub Person: Patrice Schultz  Other: Manuel Vega    Indications for procedure   left ureteral obstruction from a proximal calculus with suspected acute pyelonephritis    Findings:    #1.  Cystoscopy: The urethra is without evidence of mass or lesion.  The bladder itself shows no mucosal lesions, glomerulations or masses.  Ureteral orifices are normal in position and configuration.   #2.  Interpretation of left retrograde ureteropyelogram: The left ureter is normal in its course and caliber in the mid and distal aspects.  There is a clear transition change above a calcification that is noted approximately the L3 level of the ureter consistent with a stone.  Moderate hydronephrosis is noted above this with some calyceal extravasation the upper pole.      Procedure details:  After appropriate anesthesia, positioning, prep and drape, timeout protocol was observed.   22 Dominican cystoscope sheath with 30 and 70° lenses is used inspect the bladder.  The left orifice is cannulated the 5 Dominican cone-tipped catheter and half-strength contrast is injected.  The interpretation of this is listed above as well as the cystoscopic findings.    0.03 mm Sensor guidewire is then passed and manipulated past the stone up for left renal pelvis.  Significant purulent fluid was noted when the wire was passed.  5 Dominican open-ended catheters passed over this to obtain some fluid from left renal pelvis for culture.  The  wire is then replaced through the 5 Stateless catheter and the catheter removed.  This followed by placement of a 6 Stateless by 24 senna meter double-J ureteral stent curling one in the pelvis other than the bladder.  Patient tolerated this without difficulty.    Estimated Blood Loss: less than 30 mL    Specimens:                ID Type Source Tests Collected by Time   1 :  Body Fluid Kidney, Left BODY FLUID CULTURE Tyrone Machuca MD 10/6/2018 0954         Drains:   Ureteral Drain/Stent Left ureter  (Active)       Complications: none    Plan:  We will 8 urine culture results.  Patient will be left on Rocephin at this point.  Hopefully we can switch an oral antibiotic tomorrow and send the patient home.  She needs about 7-10 days.  Tract drainage before attempting ureteroscopy.    Please note that portions of this note were completed with a voice recognition program.)  Tyrone Machuca MD     Date: 10/6/2018  Time: 10:06 AM

## 2018-10-06 NOTE — ANESTHESIA PREPROCEDURE EVALUATION
Anesthesia Evaluation     Patient summary reviewed and Nursing notes reviewed   no history of anesthetic complications:  NPO Solid Status: > 8 hours  NPO Liquid Status: > 8 hours           Airway   Mallampati: II  TM distance: >3 FB  Neck ROM: full  No difficulty expected  Dental    (+) poor dentition        Pulmonary - negative pulmonary ROS and normal exam   Cardiovascular - negative cardio ROS and normal exam        Neuro/Psych  (+) psychiatric history Anxiety and Depression,     GI/Hepatic/Renal/Endo - negative ROS     Musculoskeletal (-) negative ROS    Abdominal    Substance History - negative use     OB/GYN negative ob/gyn ROS         Other                      Anesthesia Plan    ASA 1     general     intravenous induction   Anesthetic plan, all risks, benefits, and alternatives have been provided, discussed and informed consent has been obtained with: patient.    Plan discussed with CRNA.

## 2018-10-06 NOTE — H&P
Urology H&P    Ms. De La Cruz is 37 y.o. female    CHIEF COMPLAINT: Left flank pain    HPI  Urolithiasis  Patient complains of left flank pain. Seen in [facility] ER and transferred here for definitive management.  This started 2 week(s) ago. The pain is described as colicky with radiation to the lower abdomen. Associated manifestations include nausea, diaphoresis and fever. Severity, when pain is present,  is rated at 9/10 moderate relief has been experienced from parenenteral analgesics. The patient has a remote history of stones. T.     The following portions of the patient's history were reviewed and updated as appropriate: allergies, current medications, past family history, past medical history, past social history, past surgical history and problem list.    Review of Systems   Constitutional: Positive for appetite change, diaphoresis and fever.   HENT: Negative for facial swelling and sore throat.    Eyes: Negative for discharge and visual disturbance.   Respiratory: Negative for cough and shortness of breath.    Cardiovascular: Negative for chest pain and leg swelling.   Gastrointestinal: Positive for nausea. Negative for anal bleeding and vomiting.   Endocrine: Negative for cold intolerance and heat intolerance.   Genitourinary: Positive for flank pain. Negative for hematuria and pelvic pain.   Musculoskeletal: Negative for back pain and gait problem.   Skin: Negative for pallor and rash.   Allergic/Immunologic: Negative for food allergies.   Neurological: Negative for seizures and headaches.   Hematological: Negative for adenopathy. Does not bruise/bleed easily.   Psychiatric/Behavioral: Negative for dysphoric mood, self-injury and suicidal ideas.       Prescriptions Prior to Admission   Medication Sig Dispense Refill Last Dose   • FLUoxetine (PROzac) 40 MG capsule Take 40 capsules by mouth Daily.      • gabapentin (NEURONTIN) 600 MG tablet Take 600 tablets by mouth 2 (Two) Times a Day.      • lamoTRIgine  "(LaMICtal) 100 MG tablet Take 100 tablets by mouth 2 (Two) Times a Day.      • levonorgestrel-ethinyl estradiol (LUTERA) 0.1-20 MG-MCG per tablet Take 20 capsules by mouth Daily.            Current Facility-Administered Medications:   •  cefTRIAXone (ROCEPHIN) 1 g/10mL IV PUSH syringe, 1 g, Intravenous, Q24H, Tyrone Machuca MD  •  FLUoxetine (PROzac) capsule 40 mg, 40 mg, Oral, Q12H, Tyrone Machuca MD  •  gabapentin (NEURONTIN) capsule 600 mg, 600 mg, Oral, Nightly, Tyrone Machuca MD  •  HYDROmorphone (DILAUDID) injection 0.5 mg, 0.5 mg, Intravenous, Q3H PRN, Tyrone Machuca MD, 0.5 mg at 10/06/18 0526  •  HYDROmorphone (DILAUDID) injection solution 1 mg, 1 mg, Intravenous, Q2H PRN, Tyrone Machuca MD, 1 mg at 10/05/18 2257  •  ketorolac (TORADOL) injection 30 mg, 30 mg, Intravenous, Q6H PRN, Tyrone Machuca MD, 30 mg at 10/06/18 0352  •  lamoTRIgine (LaMICtal) tablet 100 mg, 100 mg, Oral, BID, Tyrone Machuca MD  •  ondansetron (ZOFRAN) injection 4 mg, 4 mg, Intravenous, Q4H PRN, Tyrone Machuca MD  •  oxyCODONE-acetaminophen (PERCOCET)  MG per tablet 1 tablet, 1 tablet, Oral, Q4H PRN, Tyrone Machuca MD  •  oxyCODONE-acetaminophen (PERCOCET) 5-325 MG per tablet 1 tablet, 1 tablet, Oral, Q4H PRN, Tyrone Machuca MD  •  sodium chloride 0.9 % infusion, 150 mL/hr, Intravenous, Continuous, Tyrone Machuca MD, Stopped at 10/06/18 0527  •  zolpidem (AMBIEN) tablet 5 mg, 5 mg, Oral, Nightly PRN, Tyrone Machuca MD    History reviewed. No pertinent past medical history.    History reviewed. No pertinent surgical history.    Social History     Social History   • Marital status:      Social History Main Topics   • Drug use: Unknown     Other Topics Concern   • Not on file       History reviewed. No pertinent family history.    /73 (BP Location: Left arm, Patient Position: Lying)   Pulse 58   Temp 97.6 °F (36.4 °C) (Oral)   Resp 16   Ht 167.6 cm (66\")   Wt 73.7 kg (162 lb " 6.4 oz)   SpO2 98%   BMI 26.21 kg/m²     Physical Exam  Constitutional: Well nourished, Well developed; No apparent distress; Vital reviewed as above  Psychiatric: Appropriate affect; Alert and oriented  Eyes: Unremarkable  Musculoskeletal: Normal gait and station  GI: Abdomen is soft, with mild left upper quadrant tenderness. CVA tenderness noted on percussion of flank.   Respiratory: No distress; Unlabored movement; No accessory musculature needed with symmetric movements  Skin: No pallor or diaphoresis  Lymphatic: No adenopathy neck or groin    Lab Results   Component Value Date    GLUCOSE 77 10/06/2018    BUN 13 10/06/2018    CREATININE 1.16 10/06/2018    EGFRIFNONA 53 (L) 10/06/2018    BCR 11.2 10/06/2018    CO2 24.0 10/06/2018    CALCIUM 7.9 (L) 10/06/2018    ALBUMIN 2.80 (L) 10/06/2018    AST 26 10/06/2018    ALT 26 10/06/2018     Lab Results   Component Value Date    GLUCOSE 77 10/06/2018    CALCIUM 7.9 (L) 10/06/2018     10/06/2018    K 3.5 10/06/2018    CO2 24.0 10/06/2018     10/06/2018    BUN 13 10/06/2018    CREATININE 1.16 10/06/2018    EGFRIFNONA 53 (L) 10/06/2018    BCR 11.2 10/06/2018    ANIONGAP 9.0 10/06/2018     Lab Results   Component Value Date    WBC 4.16 (L) 10/06/2018    HGB 11.8 (L) 10/06/2018    HCT 35.3 (L) 10/06/2018    MCV 89.4 10/06/2018     10/06/2018     Independent review of a CT scan of abdomen and pelvis without contrast  The CT scan of the abdomen/pelvis done without contrast is available for me to review.  Treatment recommendations require an independent review.  First I scanned the liver, spleen, and bowel pattern.  The retroperitoneum including the major vessels and lymphatic packages are briefly reviewed.  This film as been reviewed by the radiologist to determine any non urologic abnormalities that are present.  The kidneys are closely inspected for size, symmetry, contour, parenchymal thickness, perinephric reaction, presence of calcifications, and  intrarenal dilation of the collecting system.  The ureters are inspected for their course, caliber, and any calcifications.  The bladder is inspected for its thickness, size, and presence of any calcifications.  This scan shows a 9 mm stone in left proximal ureter with obstruction. Moderate trung-renal edema without urinoma.        Assessment and Plan  #1. Left proximal ureteral obstruction with hydronephrosis secondary to proximal ureteral calculus.   #2. Intractable pain from colic  #3. Fever, probably acute pyelonephritis  - Will need parenteral analgesics for pain controlled included opiates.  - IV antibiotics started with ceftriaxone.  - Will need further evaluation with retrograde ureteropyelogram  - Needs cystoscopy and stent placement to decompress upper tracts.   - Definitive stone management and requirement for removal of stent discussed with patient and her significant other.   (Please note that portions of this note were completed with a voice recognition program.)  Tyrone Machuca MD  10/06/18  7:48 AM

## 2018-10-06 NOTE — ANESTHESIA POSTPROCEDURE EVALUATION
"Patient: Arin De La Cruz    Procedure Summary     Date:  10/06/18 Room / Location:   [facility] OR 01 /  [facility] OR    Anesthesia Start:  0924 Anesthesia Stop:  1002    Procedure:  CYSTOSCOPY LEFT URETERAL STENT INSERTION (Left Bladder) Diagnosis:      Surgeon:  Tyrone Machuca MD Provider:  Stefany Morelos CRNA    Anesthesia Type:  general ASA Status:  1          Anesthesia Type: general  Last vitals  BP   121/79 (10/06/18 1020)   Temp   97.9 °F (36.6 °C) (10/06/18 1000)   Pulse   87 (10/06/18 1020)   Resp   14 (10/06/18 1015)     SpO2   93 % (10/06/18 1020)     Post Anesthesia Care and Evaluation    Patient location during evaluation: PACU  Patient participation: complete - patient participated  Level of consciousness: awake and alert  Pain management: adequate  Airway patency: patent  Anesthetic complications: No anesthetic complications  PONV Status: none  Cardiovascular status: acceptable  Respiratory status: acceptable  Hydration status: acceptable    Comments: Blood pressure 121/79, pulse 87, temperature 97.9 °F (36.6 °C), temperature source Temporal Artery , resp. rate 14, height 167.6 cm (66\"), weight 73.7 kg (162 lb 6.4 oz), SpO2 93 %.    Pt discharged from PACU based on donell score >8. See RN notes.  No anesthesia care post op    "

## 2018-10-06 NOTE — ANESTHESIA PROCEDURE NOTES
Airway  Urgency: elective    Airway not difficult    General Information and Staff    Patient location during procedure: OR  CRNA: GUY CODY    Indications and Patient Condition  Indications for airway management: airway protection    Preoxygenated: yes  Mask difficulty assessment: 1 - vent by mask    Final Airway Details  Final airway type: endotracheal airway      Successful airway: ETT  Cuffed: yes   Successful intubation technique: video laryngoscopy  Facilitating devices/methods: intubating stylet  Endotracheal tube insertion site: oral  Blade: Erin  Blade size: 3  ETT size: 7.5 mm  Cormack-Lehane Classification: grade I - full view of glottis  Placement verified by: chest auscultation and capnometry   Cuff volume (mL): 6  Measured from: lips  ETT to lips (cm): 22  Number of attempts at approach: 1    Additional Comments  Easy, atraumatic intubation. Teeth same as preop exam.

## 2018-10-06 NOTE — PLAN OF CARE
Problem: Patient Care Overview  Goal: Plan of Care Review   10/06/18 5603   Coping/Psychosocial   Plan of Care Reviewed With patient   Plan of Care Review   Progress improving   OTHER   Outcome Summary possible home tomorrow, cystoscopy, left retrograde urteropyelogram, left ureteral stent insertion, prn percocet given x 1, patient is voiding well, will continue to monitor       Problem: Pain, Acute (Adult)  Goal: Identify Related Risk Factors and Signs and Symptoms  Outcome: Ongoing (interventions implemented as appropriate)    Goal: Acceptable Pain Control/Comfort Level  Outcome: Ongoing (interventions implemented as appropriate)

## 2018-10-07 LAB
ORGANISM: ABNORMAL
URINE CULTURE, ROUTINE: ABNORMAL
URINE CULTURE, ROUTINE: ABNORMAL

## 2018-10-07 PROCEDURE — 25010000002 CEFTRIAXONE PER 250 MG: Performed by: UROLOGY

## 2018-10-07 PROCEDURE — 25010000002 HYDROMORPHONE PER 4 MG: Performed by: UROLOGY

## 2018-10-07 PROCEDURE — 25010000002 KETOROLAC TROMETHAMINE PER 15 MG: Performed by: UROLOGY

## 2018-10-07 PROCEDURE — 99232 SBSQ HOSP IP/OBS MODERATE 35: CPT | Performed by: UROLOGY

## 2018-10-07 RX ORDER — SODIUM CHLORIDE 0.9 % (FLUSH) 0.9 %
3-10 SYRINGE (ML) INJECTION AS NEEDED
Status: DISCONTINUED | OUTPATIENT
Start: 2018-10-07 | End: 2018-10-08 | Stop reason: HOSPADM

## 2018-10-07 RX ORDER — SODIUM CHLORIDE 0.9 % (FLUSH) 0.9 %
3 SYRINGE (ML) INJECTION EVERY 12 HOURS SCHEDULED
Status: DISCONTINUED | OUTPATIENT
Start: 2018-10-07 | End: 2018-10-08 | Stop reason: HOSPADM

## 2018-10-07 RX ADMIN — OXYCODONE HYDROCHLORIDE AND ACETAMINOPHEN 1 TABLET: 10; 325 TABLET ORAL at 03:26

## 2018-10-07 RX ADMIN — GABAPENTIN 400 MG: 400 CAPSULE ORAL at 16:34

## 2018-10-07 RX ADMIN — ARIPIPRAZOLE 5 MG: 5 TABLET ORAL at 21:13

## 2018-10-07 RX ADMIN — OXYCODONE HYDROCHLORIDE AND ACETAMINOPHEN 1 TABLET: 10; 325 TABLET ORAL at 12:08

## 2018-10-07 RX ADMIN — OXYCODONE HYDROCHLORIDE AND ACETAMINOPHEN 1 TABLET: 10; 325 TABLET ORAL at 07:55

## 2018-10-07 RX ADMIN — GABAPENTIN 400 MG: 400 CAPSULE ORAL at 09:13

## 2018-10-07 RX ADMIN — CEFTRIAXONE SODIUM 1 G: 1 INJECTION, POWDER, FOR SOLUTION INTRAMUSCULAR; INTRAVENOUS at 18:05

## 2018-10-07 RX ADMIN — HYDROMORPHONE HYDROCHLORIDE 0.5 MG: 1 INJECTION, SOLUTION INTRAMUSCULAR; INTRAVENOUS; SUBCUTANEOUS at 00:28

## 2018-10-07 RX ADMIN — HYDROMORPHONE HYDROCHLORIDE 0.5 MG: 1 INJECTION, SOLUTION INTRAMUSCULAR; INTRAVENOUS; SUBCUTANEOUS at 06:50

## 2018-10-07 RX ADMIN — FLUOXETINE HYDROCHLORIDE 40 MG: 20 CAPSULE ORAL at 09:13

## 2018-10-07 RX ADMIN — OXYCODONE HYDROCHLORIDE AND ACETAMINOPHEN 1 TABLET: 10; 325 TABLET ORAL at 16:34

## 2018-10-07 RX ADMIN — KETOROLAC TROMETHAMINE 30 MG: 30 INJECTION, SOLUTION INTRAMUSCULAR at 18:32

## 2018-10-07 RX ADMIN — LAMOTRIGINE 150 MG: 100 TABLET ORAL at 09:12

## 2018-10-07 RX ADMIN — OXYCODONE HYDROCHLORIDE AND ACETAMINOPHEN 1 TABLET: 10; 325 TABLET ORAL at 21:13

## 2018-10-07 RX ADMIN — GABAPENTIN 400 MG: 400 CAPSULE ORAL at 21:13

## 2018-10-07 RX ADMIN — SODIUM CHLORIDE 150 ML/HR: 9 INJECTION, SOLUTION INTRAVENOUS at 16:35

## 2018-10-07 RX ADMIN — SODIUM CHLORIDE 150 ML/HR: 9 INJECTION, SOLUTION INTRAVENOUS at 00:28

## 2018-10-07 RX ADMIN — LAMOTRIGINE 150 MG: 100 TABLET ORAL at 21:14

## 2018-10-07 NOTE — PLAN OF CARE
Problem: Patient Care Overview  Goal: Plan of Care Review  Outcome: Ongoing (interventions implemented as appropriate)   10/07/18 0129   Coping/Psychosocial   Plan of Care Reviewed With patient   Plan of Care Review   Progress improving

## 2018-10-07 NOTE — PAYOR COMM NOTE
"ADMIT INPT 10-5-18  UR  [id number]    Jody Brandt (37 y.o. Female)     Date of Birth Social Security Number Address Home Phone MRN    [id number]  [id number] [id number]  [id number]    [employer] Marital Status          Anglican        Admission Date Admission Type Admitting Provider Attending Provider Department, Room/Bed    10/5/18 Urgent Tyrone Machuca MD Spicer, Donald L, MD [facility] 3C, 375/1    Discharge Date Discharge Disposition Discharge Destination                       Attending Provider:  Tyrone Machuca MD    Allergies:  No Known Allergies    Isolation:  None   Infection:  None   Code Status:  CPR    Ht:  167.6 cm (66\")   Wt:  73.7 kg (162 lb 6.4 oz)    Admission Cmt:  None   Principal Problem:  None                Active Insurance as of 10/5/2018     Primary Coverage     Payor Plan Insurance Group Employer/Plan Group    HU[employer] MEDICAID HU[employer] [id number]     Payor Plan Address Payor Plan Phone Number Effective From Effective To    PO  [phone] 10/5/2018     [facility] [id number]       Subscriber Name Subscriber Birth Date Member ID       JODY BRANDT 1980 [id number]                 Emergency Contacts      (Rel.) Home Phone Work Phone Mobile Phone    Bull Montero (Significant Other) [phone] -- --    Ron Nunez (Other) [phone] -- --               History & Physical      Tyrone Machuca MD at 10/5/2018 10:30 AM          Urology H&P    Ms. Brandt is 37 y.o. female    CHIEF COMPLAINT: Left flank pain    HPI  Urolithiasis  Patient complains of left flank pain. Seen in [facility] ER and transferred here for definitive management.  This started 2 week(s) ago. The pain is described as colicky with radiation to the lower abdomen. Associated manifestations include nausea, diaphoresis and fever. Severity, when pain is present,  is rated at 9/10 moderate relief has been experienced from parenenteral " analgesics. The patient has a remote history of stones. T.     The following portions of the patient's history were reviewed and updated as appropriate: allergies, current medications, past family history, past medical history, past social history, past surgical history and problem list.    Review of Systems   Constitutional: Positive for appetite change, diaphoresis and fever.   HENT: Negative for facial swelling and sore throat.    Eyes: Negative for discharge and visual disturbance.   Respiratory: Negative for cough and shortness of breath.    Cardiovascular: Negative for chest pain and leg swelling.   Gastrointestinal: Positive for nausea. Negative for anal bleeding and vomiting.   Endocrine: Negative for cold intolerance and heat intolerance.   Genitourinary: Positive for flank pain. Negative for hematuria and pelvic pain.   Musculoskeletal: Negative for back pain and gait problem.   Skin: Negative for pallor and rash.   Allergic/Immunologic: Negative for food allergies.   Neurological: Negative for seizures and headaches.   Hematological: Negative for adenopathy. Does not bruise/bleed easily.   Psychiatric/Behavioral: Negative for dysphoric mood, self-injury and suicidal ideas.       Prescriptions Prior to Admission   Medication Sig Dispense Refill Last Dose   • FLUoxetine (PROzac) 40 MG capsule Take 40 capsules by mouth Daily.      • gabapentin (NEURONTIN) 600 MG tablet Take 600 tablets by mouth 2 (Two) Times a Day.      • lamoTRIgine (LaMICtal) 100 MG tablet Take 100 tablets by mouth 2 (Two) Times a Day.      • levonorgestrel-ethinyl estradiol (LUTERA) 0.1-20 MG-MCG per tablet Take 20 capsules by mouth Daily.            Current Facility-Administered Medications:   •  cefTRIAXone (ROCEPHIN) 1 g/10mL IV PUSH syringe, 1 g, Intravenous, Q24H, Tyrone Machuca MD  •  FLUoxetine (PROzac) capsule 40 mg, 40 mg, Oral, Q12H, Tyrone Machuca MD  •  gabapentin (NEURONTIN) capsule 600 mg, 600 mg, Oral, Nightly,  "Tyrone Machuca MD  •  HYDROmorphone (DILAUDID) injection 0.5 mg, 0.5 mg, Intravenous, Q3H PRN, Tyrone Machuca MD, 0.5 mg at 10/06/18 0526  •  HYDROmorphone (DILAUDID) injection solution 1 mg, 1 mg, Intravenous, Q2H PRN, Tyrone Machuca MD, 1 mg at 10/05/18 2257  •  ketorolac (TORADOL) injection 30 mg, 30 mg, Intravenous, Q6H PRN, Tyrone Machuca MD, 30 mg at 10/06/18 0352  •  lamoTRIgine (LaMICtal) tablet 100 mg, 100 mg, Oral, BID, Tyrone Machuca MD  •  ondansetron (ZOFRAN) injection 4 mg, 4 mg, Intravenous, Q4H PRN, Tyrone Machuca MD  •  oxyCODONE-acetaminophen (PERCOCET)  MG per tablet 1 tablet, 1 tablet, Oral, Q4H PRN, Tyrone Machuca MD  •  oxyCODONE-acetaminophen (PERCOCET) 5-325 MG per tablet 1 tablet, 1 tablet, Oral, Q4H PRN, Tyrone Machuca MD  •  sodium chloride 0.9 % infusion, 150 mL/hr, Intravenous, Continuous, Tyrone Machuca MD, Stopped at 10/06/18 0527  •  zolpidem (AMBIEN) tablet 5 mg, 5 mg, Oral, Nightly PRN, Tyrone Machuca MD    History reviewed. No pertinent past medical history.    History reviewed. No pertinent surgical history.    Social History     Social History   • Marital status:      Social History Main Topics   • Drug use: Unknown     Other Topics Concern   • Not on file       History reviewed. No pertinent family history.    /73 (BP Location: Left arm, Patient Position: Lying)   Pulse 58   Temp 97.6 °F (36.4 °C) (Oral)   Resp 16   Ht 167.6 cm (66\")   Wt 73.7 kg (162 lb 6.4 oz)   SpO2 98%   BMI 26.21 kg/m²      Physical Exam  Constitutional: Well nourished, Well developed; No apparent distress; Vital reviewed as above  Psychiatric: Appropriate affect; Alert and oriented  Eyes: Unremarkable  Musculoskeletal: Normal gait and station  GI: Abdomen is soft, with mild left upper quadrant tenderness. CVA tenderness noted on percussion of flank.   Respiratory: No distress; Unlabored movement; No accessory musculature needed with symmetric " movements  Skin: No pallor or diaphoresis  Lymphatic: No adenopathy neck or groin    Lab Results   Component Value Date    GLUCOSE 77 10/06/2018    BUN 13 10/06/2018    CREATININE 1.16 10/06/2018    EGFRIFNONA 53 (L) 10/06/2018    BCR 11.2 10/06/2018    CO2 24.0 10/06/2018    CALCIUM 7.9 (L) 10/06/2018    ALBUMIN 2.80 (L) 10/06/2018    AST 26 10/06/2018    ALT 26 10/06/2018     Lab Results   Component Value Date    GLUCOSE 77 10/06/2018    CALCIUM 7.9 (L) 10/06/2018     10/06/2018    K 3.5 10/06/2018    CO2 24.0 10/06/2018     10/06/2018    BUN 13 10/06/2018    CREATININE 1.16 10/06/2018    EGFRIFNONA 53 (L) 10/06/2018    BCR 11.2 10/06/2018    ANIONGAP 9.0 10/06/2018     Lab Results   Component Value Date    WBC 4.16 (L) 10/06/2018    HGB 11.8 (L) 10/06/2018    HCT 35.3 (L) 10/06/2018    MCV 89.4 10/06/2018     10/06/2018     Independent review of a CT scan of abdomen and pelvis without contrast  The CT scan of the abdomen/pelvis done without contrast is available for me to review.  Treatment recommendations require an independent review.  First I scanned the liver, spleen, and bowel pattern.  The retroperitoneum including the major vessels and lymphatic packages are briefly reviewed.  This film as been reviewed by the radiologist to determine any non urologic abnormalities that are present.  The kidneys are closely inspected for size, symmetry, contour, parenchymal thickness, perinephric reaction, presence of calcifications, and intrarenal dilation of the collecting system.  The ureters are inspected for their course, caliber, and any calcifications.  The bladder is inspected for its thickness, size, and presence of any calcifications.  This scan shows a 9 mm stone in left proximal ureter with obstruction. Moderate trung-renal edema without urinoma.        Assessment and Plan  #1. Left proximal ureteral obstruction with hydronephrosis secondary to proximal ureteral calculus.   #2. Intractable  pain from colic  #3. Fever, probably acute pyelonephritis  - Will need parenteral analgesics for pain controlled included opiates.  - IV antibiotics started with ceftriaxone.  - Will need further evaluation with retrograde ureteropyelogram  - Needs cystoscopy and stent placement to decompress upper tracts.   - Definitive stone management and requirement for removal of stent discussed with patient and her significant other.   (Please note that portions of this note were completed with a voice recognition program.)  Tyrone Machuca MD  10/06/18  7:48 AM              Electronically signed by Tyrone Machuca MD at 10/6/2018  8:29 AM       Emergency Department Notes     No notes of this type exist for this encounter.              ICU Vital Signs     Row Name 10/07/18 0813 10/07/18 0755 10/07/18 0408 10/07/18 0008 10/06/18 2133       Vitals    Temp 97.9 °F (36.6 °C)  -- 98.4 °F (36.9 °C) 98.3 °F (36.8 °C)  --    Temp src Oral  -- Oral Oral  --    Pulse 59  -- 55 62  --    Heart Rate Source Monitor  -- Monitor Monitor  --    Resp 16  -- 16 16  --    Resp Rate Source Visual  -- Visual Visual  --    /81  -- 121/55 139/67  --    Noninvasive MAP (mmHg)  --  -- 73 84  --    BP Location Right arm  -- Right arm Right arm  --    BP Method Automatic  -- Automatic Automatic  --    Patient Position Lying  -- Lying Lying  --       Oxygen Therapy    SpO2 97 %  -- 95 % 95 %  --    Pulse Oximetry Type Intermittent  -- Intermittent Intermittent  --    Device (Oxygen Therapy) room air room air room air room air room air    Row Name 10/06/18 1900 10/06/18 1020 10/06/18 1015 10/06/18 1010 10/06/18 1005       Vitals    Temp 97.8 °F (36.6 °C)  --  --  --  --    Temp src Oral  --  --  --  --    Pulse 57 87  -- 82 76    Heart Rate Source Monitor  -- Monitor Monitor Monitor    Resp 16  -- 14 16 16    Resp Rate Source Visual  -- Monitor Monitor Monitor    /64 121/79 120/70 94/55 (!)  88/56    Noninvasive MAP (mmHg) 77 87  -- 65 64     "BP Location Right arm  --  --  --  --    BP Method Automatic  -- Automatic Automatic Automatic    Patient Position Lying  -- Lying  -- Lying       Oxygen Therapy    SpO2 96 % 93 %  -- 97 % 97 %    Pulse Oximetry Type Intermittent Continuous Continuous Continuous Continuous    Device (Oxygen Therapy) room air room air room air room air simple face mask    Flow (L/min)  --  --  --  -- 8    Row Name 10/06/18 1000 10/06/18 0805 10/06/18 0800 10/06/18 0428 10/06/18 0036       Vitals    Temp 97.9 °F (36.6 °C) 98.2 °F (36.8 °C)  -- 97.6 °F (36.4 °C) 98.1 °F (36.7 °C)    Temp src Temporal Artery  Oral  -- Oral Oral    Pulse 75 94  -- 58 64    Heart Rate Source Monitor Monitor  -- Monitor Monitor    Resp 20 16  -- 16 16    Resp Rate Source Monitor Visual  -- Visual Visual    BP 95/63 106/54  -- 129/73 96/49    BP Location  -- Right arm  -- Left arm Left arm    BP Method Automatic Automatic  -- Automatic Automatic    Patient Position Lying Lying  -- Lying Lying       Oxygen Therapy    SpO2 97 % 95 %  -- 98 % 97 %    Pulse Oximetry Type Continuous Intermittent  --  --  --    Device (Oxygen Therapy) simple face mask room air room air  --  --    Flow (L/min) 8  --  --  --  --    Row Name 10/05/18 2219 10/05/18 2201                Height and Weight    Height 167.6 cm (66\")  --       Height Method Stated  --       Weight 73.7 kg (162 lb 6.4 oz)  --       Weight Method Bed scale  --       Ideal Body Weight (IBW) (kg) 59.58  --       BSA (Calculated - sq m) 1.83 sq meters  --       BMI (Calculated) 26.2  --       Weight in (lb) to have BMI = 25 154.6  --          Vitals    Temp  -- 98.2 °F (36.8 °C)       Temp src  -- Oral       Pulse  -- 100       Heart Rate Source  -- Monitor       Resp  -- 16       Resp Rate Source  -- Visual       BP  -- 97/48       BP Location  -- Left arm       BP Method  -- Automatic       Patient Position  -- Lying          Oxygen Therapy    SpO2  -- 98 %           Intake & Output (last 3 days)       10/04 " 0701 - 10/05 0700 10/05 0701 - 10/06 0700 10/06 0701 - 10/07 0700 10/07 0701 - 10/08 0700    P.O.   390     I.V. (mL/kg)   600 (8.1)     Total Intake(mL/kg)   990 (13.4)     Urine (mL/kg/hr)   1600 (0.9) 900 (3.1)    Total Output     1600 900    Net     -610 -900            Unmeasured Urine Occurrence   2 x     Unmeasured Stool Occurrence   1 x         Lines, Drains & Airways    Active LDAs     Name:   Placement date:   Placement time:   Site:   Days:    Peripheral IV 10/05/18 2005 Left Forearm  10/05/18    2005    Forearm    1    Ureteral Drain/Stent Left ureter   10/06/18    0950    Left ureter    1         Inactive LDAs     Name:   Placement date:   Placement time:   Removal date:   Removal time:   Site:   Days:    [REMOVED] ETT   10/06/18    0934 created via procedure documentation  10/06/18    0955      less than 1                Hospital Medications (all)       Dose Frequency Start End    ARIPiprazole (ABILIFY) tablet 5 mg 5 mg Nightly 10/6/2018     Sig - Route: Take 1 tablet by mouth Every Night. - Oral    cefTRIAXone (ROCEPHIN) 1 g/10mL IV PUSH syringe 1 g Every 24 Hours 10/6/2018 10/13/2018    Sig - Route: Infuse 10 mL into a venous catheter Daily. - Intravenous    FLUoxetine (PROzac) capsule 40 mg 40 mg Daily 10/6/2018     Sig - Route: Take 2 capsules by mouth Daily. - Oral    gabapentin (NEURONTIN) capsule 400 mg 400 mg 3 Times Daily 10/6/2018     Sig - Route: Take 1 capsule by mouth 3 (Three) Times a Day. - Oral    HYDROmorphone (DILAUDID) injection 0.5 mg 0.5 mg Every 3 Hours PRN 10/5/2018 10/15/2018    Sig - Route: Infuse 0.5 mL into a venous catheter Every 3 (Three) Hours As Needed for Moderate Pain . - Intravenous    HYDROmorphone (DILAUDID) injection solution 1 mg 1 mg Every 2 Hours PRN 10/5/2018 10/15/2018    Sig - Route: Infuse 1 mL into a venous catheter Every 2 (Two) Hours As Needed for Severe Pain . - Intravenous    ketorolac (TORADOL) injection 30 mg 30 mg Every 6 Hours PRN 10/5/2018  10/10/2018    Sig - Route: Infuse 1 mL into a venous catheter Every 6 (Six) Hours As Needed for Moderate Pain . - Intravenous    lamoTRIgine (LaMICtal) tablet 150 mg 150 mg 2 Times Daily 10/6/2018     Sig - Route: Take 150 mg by mouth 2 (Two) Times a Day. - Oral    NON FORMULARY 1 tablet Daily 10/7/2018     Sig - Route: Take 1 tablet by mouth Daily. - Oral    Notes to Pharmacy: Home medication Lutera    ondansetron (ZOFRAN) injection 4 mg 4 mg Every 4 Hours PRN 10/5/2018     Sig - Route: Infuse 2 mL into a venous catheter Every 4 (Four) Hours As Needed for Nausea or Vomiting. - Intravenous    oxyCODONE-acetaminophen (PERCOCET)  MG per tablet 1 tablet 1 tablet Every 4 Hours PRN 10/5/2018 10/15/2018    Sig - Route: Take 1 tablet by mouth Every 4 (Four) Hours As Needed for Severe Pain . - Oral    oxyCODONE-acetaminophen (PERCOCET)  MG per tablet 1 tablet 1 tablet Once As Needed 10/6/2018 10/6/2018    Sig - Route: Take 1 tablet by mouth 1 (One) Time As Needed for Moderate Pain . - Oral    oxyCODONE-acetaminophen (PERCOCET) 5-325 MG per tablet 1 tablet 1 tablet Every 4 Hours PRN 10/5/2018 10/15/2018    Sig - Route: Take 1 tablet by mouth Every 4 (Four) Hours As Needed for Moderate Pain . - Oral    sodium chloride 0.9 % flush 3 mL 3 mL Every 12 Hours Scheduled 10/7/2018     Sig - Route: Infuse 3 mL into a venous catheter Every 12 (Twelve) Hours. - Intravenous    sodium chloride 0.9 % flush 3-10 mL 3-10 mL As Needed 10/7/2018     Sig - Route: Infuse 3-10 mL into a venous catheter As Needed for Line Care. - Intravenous    sodium chloride 0.9 % infusion 150 mL/hr Continuous 10/5/2018     Sig - Route: Infuse 150 mL/hr into a venous catheter Continuous. - Intravenous    tamsulosin (FLOMAX) 24 hr capsule 0.4 mg 0.4 mg Once 10/5/2018 10/5/2018    Sig - Route: Take 1 capsule by mouth 1 (One) Time. - Oral    zolpidem (AMBIEN) tablet 5 mg 5 mg Nightly PRN 10/5/2018 10/15/2018    Sig - Route: Take 1 tablet by mouth At  Night As Needed for Sleep. - Oral    atropine sulfate injection 0.5 mg (Discontinued) 0.5 mg Once As Needed 10/6/2018 10/6/2018    Sig - Route: Infuse 5 mL into a venous catheter 1 (One) Time As Needed for Bradycardia (symptomatic bradycardia (hypotension, dizziness, confusion). Notify attending anesthesiologist.). - Intravenous    fentaNYL citrate (PF) (SUBLIMAZE) injection 25 mcg (Discontinued) 25 mcg As Needed 10/6/2018 10/6/2018    Sig - Route: Infuse 0.5 mL into a venous catheter As Needed for Moderate Pain  or Severe Pain  (Use for breakthrough pain). - Intravenous    flumazenil (ROMAZICON) injection 0.2 mg (Discontinued) 0.2 mg As Needed 10/6/2018 10/6/2018    Sig - Route: Infuse 2 mL into a venous catheter As Needed (unresponsiveness). - Intravenous    FLUoxetine (PROzac) capsule 40 mg (Discontinued) 40 mg Every 12 Hours Scheduled 10/6/2018 10/6/2018    Sig - Route: Take 2 capsules by mouth Every 12 (Twelve) Hours. - Oral    Reason for Discontinue: Duplicate order    gabapentin (NEURONTIN) capsule 600 mg (Discontinued) 600 mg Nightly 10/6/2018 10/6/2018    Sig - Route: Take 2 capsules by mouth Every Night. - Oral    hydrALAZINE (APRESOLINE) injection 5 mg (Discontinued) 5 mg Every 10 Minutes PRN 10/6/2018 10/6/2018    Sig - Route: Infuse 0.25 mL into a venous catheter Every 10 (Ten) Minutes As Needed for High Blood Pressure (for systolic blood pressure greater than 180 mmHg or diastolic blood pressure greater than 105 mmHg when HR less than 60). - Intravenous    iopamidol (ISOVUE-300) 61 % injection (Discontinued)  As Needed 10/6/2018 10/6/2018    Sig: As Needed.    Reason for Discontinue: Patient Discharge    ipratropium-albuterol (DUO-NEB) nebulizer solution 3 mL (Discontinued) 3 mL Once As Needed 10/6/2018 10/6/2018    Sig - Route: Take 3 mL by nebulization 1 (One) Time As Needed for Wheezing or Shortness of Air (bronchospasm). - Nebulization    labetalol (NORMODYNE,TRANDATE) injection 5 mg (Discontinued)  5 mg Every 5 Minutes PRN 10/6/2018 10/6/2018    Sig - Route: Infuse 1 mL into a venous catheter Every 5 (Five) Minutes As Needed for High Blood Pressure (for systolic blood pressure greater than 180 mmHg or diastolic blood pressure greater than 105 mmHg). - Intravenous    lamoTRIgine (LaMICtal) tablet 100 mg (Discontinued) 100 mg 2 Times Daily 10/6/2018 10/6/2018    Sig - Route: Take 1 tablet by mouth 2 (Two) Times a Day. - Oral    meperidine (DEMEROL) injection 12.5 mg (Discontinued) 12.5 mg Every 5 Minutes PRN 10/6/2018 10/6/2018    Sig - Route: Infuse 0.5 mL into a venous catheter Every 5 (Five) Minutes As Needed for Shivering (May repeat x 3). - Intravenous    metoclopramide (REGLAN) injection 5 mg (Discontinued) 5 mg Every 15 Minutes PRN 10/6/2018 10/6/2018    Sig - Route: Infuse 1 mL into a venous catheter Every 15 (Fifteen) Minutes As Needed for Nausea or Vomiting (for nausea/vomiting). - Intravenous    Morphine sulfate (PF) injection 2 mg (Discontinued) 2 mg Every 10 Minutes PRN 10/6/2018 10/6/2018    Sig - Route: Infuse 1 mL into a venous catheter Every 10 (Ten) Minutes As Needed for Moderate Pain  or Severe Pain . - Intravenous    naloxone (NARCAN) injection 0.04 mg (Discontinued) 0.04 mg As Needed 10/6/2018 10/6/2018    Sig - Route: Infuse 0.1 mL into a venous catheter As Needed for Opioid Reversal (unresponsiveness, decrease oxygen saturation). - Intravenous    ondansetron (ZOFRAN) injection 4 mg (Discontinued) 4 mg As Needed 10/6/2018 10/6/2018    Sig - Route: Infuse 2 mL into a venous catheter As Needed for Nausea or Vomiting. - Intravenous    sodium chloride (NS) irrigation solution (Discontinued)  As Needed 10/6/2018 10/6/2018    Sig: As Needed.    Reason for Discontinue: Patient Discharge    sterile water irrigation solution (Discontinued)  As Needed 10/6/2018 10/6/2018    Sig: As Needed.    Reason for Discontinue: Patient Discharge          Lab Results (all)     Procedure Component Value Units  Date/Time    Urine Culture - Urine, [[id number]]  (Abnormal) Collected:  10/06/18 0954    Specimen:  Body Fluid from Kidney, Left Updated:  10/07/18 0836     Urine Culture Gram Negative Bacilli (A)    CBC & Differential [[id number]] Collected:  10/06/18 0555    Specimen:  Blood Updated:  10/06/18 0720    Narrative:       The following orders were created for panel order CBC & Differential.  Procedure                               Abnormality         Status                     ---------                               -----------         ------                     Manual Differential[[id number]]                                                         CBC Auto Differential[[id number]]         Abnormal            Final result                 Please view results for these tests on the individual orders.    CBC Auto Differential [[id number]]  (Abnormal) Collected:  10/06/18 0555    Specimen:  Blood Updated:  10/06/18 0720     WBC 4.16 (L) 10*3/mm3      RBC 3.95 (L) 10*6/mm3      Hemoglobin 11.8 (L) g/dL      Hematocrit 35.3 (L) %      MCV 89.4 fL      MCH 29.9 pg      MCHC 33.4 g/dL      RDW 12.8 %      RDW-SD 42.3 fl      MPV 9.8 fL      Platelets 132 10*3/mm3     Manual Differential [[id number]]  (Abnormal) Collected:  10/06/18 0555    Specimen:  Blood Updated:  10/06/18 0720     Neutrophil % 61.0 %      Lymphocyte % 25.0 %      Monocyte % 1.0 (L) %      Eosinophil % 1.0 %      Bands %  12.0 (H) %      Neutrophils Absolute 3.04 10*3/mm3      Lymphocytes Absolute 1.04 10*3/mm3      Monocytes Absolute 0.04 (L) 10*3/mm3      Eosinophils Absolute 0.04 10*3/mm3      RBC Morphology Normal     WBC Morphology Normal     Platelet Morphology Normal    hCG, Serum, Qualitative [[id number]]  (Normal) Collected:  10/06/18 0555    Specimen:  Blood Updated:  10/06/18 0712     HCG Qualitative Negative    Comprehensive Metabolic Panel [[id number]]  (Abnormal) Collected:  10/06/18 0555    Specimen:  Blood Updated:  10/06/18 0705     Glucose 77 mg/dL       BUN 13 mg/dL      Creatinine 1.16 mg/dL      Sodium 140 mmol/L      Potassium 3.5 mmol/L      Chloride 107 mmol/L      CO2 24.0 mmol/L      Calcium 7.9 (L) mg/dL      Total Protein 5.9 (L) g/dL      Albumin 2.80 (L) g/dL      ALT (SGPT) 26 U/L      AST (SGOT) 26 U/L      Alkaline Phosphatase 186 (H) U/L      Total Bilirubin 0.7 mg/dL      eGFR Non African Amer 53 (L) mL/min/1.73      Globulin 3.1 gm/dL      A/G Ratio 0.9 (L) g/dL      BUN/Creatinine Ratio 11.2     Anion Gap 9.0 mmol/L     Uric Acid [[id number]]  (Normal) Collected:  10/06/18 0555    Specimen:  Blood Updated:  10/06/18 0705     Uric Acid 4.3 mg/dL           Imaging Results (all)     Procedure Component Value Units Date/Time    FL Retrograde Pyelogram In OR [[id number]] Collected:  10/06/18 1034     Updated:  10/06/18 1038    Narrative:       INTRAOPERATIVE FLUOROSCOPIC GUIDANCE 10/6/2018      INDICATION: Intraoperative fluoroscopic guidance. Intraoperative images  related to left retrograde pyelogram and placement of double-J stent.      TECHNIQUE: 6 fluoroscopic images from the operating room were submitted  for evaluation. Please note, no radiologist was in attendance for  acquisition of these images. These images are available for future  reference to the attending surgeon. Total fluoroscopic time was 1.2  minutes.      FINDINGS: Intraoperative images related to placement of left sided  double-J stent.        Impression:       1. Intraoperative fluoroscopic guidance as described.   2. Please refer to real-time fluoroscopy and operative report for full  details.  This report was finalized on 10/06/2018 10:35 by Dr. Nakia Alvarado MD.          Orders (all)     Start     Ordered    10/07/18 1045  sodium chloride 0.9 % flush 3 mL  Every 12 Hours Scheduled      10/07/18 0958    10/07/18 0958  Code Status and Medical Interventions:  Continuous      10/07/18 0958    10/07/18 0958  Insert Peripheral IV  Once      10/07/18 0958    10/07/18 0958  Saline  Lock & Maintain IV Access  Continuous      10/07/18 0958    10/07/18 0958  VTE Prophylaxis Not Indicated: Acute Infection/Rheumatologic Disorder (1); </= 3 (Low Risk)  Once      10/07/18 0958    10/07/18 0957  sodium chloride 0.9 % flush 3-10 mL  As Needed      10/07/18 0958    10/07/18 0957  VTE Prophylaxis Not Indicated: Acute Infection/Rheumatologic Disorder (1); </= 3 (Low Risk)  Once      10/07/18 0957    10/07/18 0900  NON FORMULARY  Daily     Comments:  Home medication Lutera    10/06/18 1231    10/07/18 0823  Urine Culture - Urine,  Once      10/07/18 0822    10/07/18 0821  Urinalysis With Culture If Indicated - Kidney, Left  Once,   Status:  Canceled      10/07/18 0822    10/06/18 2100  gabapentin (NEURONTIN) capsule 600 mg  Nightly,   Status:  Discontinued      10/06/18 0748    10/06/18 2100  lamoTRIgine (LaMICtal) tablet 150 mg  2 Times Daily      10/06/18 1231    10/06/18 2100  ARIPiprazole (ABILIFY) tablet 5 mg  Nightly      10/06/18 1231    10/06/18 1800  cefTRIAXone (ROCEPHIN) 1 g/10mL IV PUSH syringe  Every 24 Hours      10/05/18 2201    10/06/18 1600  gabapentin (NEURONTIN) capsule 400 mg  3 Times Daily      10/06/18 1231    10/06/18 1024  FLUoxetine (PROzac) capsule 40 mg  Daily      10/06/18 1022    10/06/18 1023  Diet Regular  Diet Effective Now      10/06/18 1022    10/06/18 1002  FL Retrograde Pyelogram In OR  1 Time Imaging      10/06/18 1001    10/06/18 0958  Vital signs every 5 minutes for 15 minutes, every 15 minutes thereafter.  Once,   Status:  Canceled      10/06/18 0957    10/06/18 0958  Call Anesthesiologist for additional IV Fluid bolus for Hypotension/Tachycardia  Continuous,   Status:  Canceled      10/06/18 0957    10/06/18 0958  Notify Anesthesia of Any Acute Changes in Patient Condition  Until Discontinued,   Status:  Canceled      10/06/18 0957    10/06/18 0958  Notify Anesthesia for Unrelieved Pain  Until Discontinued,   Status:  Canceled      10/06/18 0957    10/06/18 0958   Once DC criteria to floor met, follow surgeon's orders.  Until Discontinued,   Status:  Canceled      10/06/18 0957    10/06/18 0958  Discharge patient from PACU when discharge criteria is met.  Until Discontinued,   Status:  Canceled      10/06/18 0957    10/06/18 0957  Body Fluid Culture - Body Fluid, Kidney, Left      10/06/18 0957    10/06/18 0957  Apply warming blanket  As Needed,   Status:  Canceled     Comments:  For a recorded temp of <36.9 C    10/06/18 0957    10/06/18 0957  oxyCODONE-acetaminophen (PERCOCET)  MG per tablet 1 tablet  Once As Needed      10/06/18 0957    10/06/18 0957  Morphine sulfate (PF) injection 2 mg  Every 10 Minutes PRN,   Status:  Discontinued      10/06/18 0957    10/06/18 0957  fentaNYL citrate (PF) (SUBLIMAZE) injection 25 mcg  As Needed,   Status:  Discontinued      10/06/18 0957    10/06/18 0957  labetalol (NORMODYNE,TRANDATE) injection 5 mg  Every 5 Minutes PRN,   Status:  Discontinued      10/06/18 0957    10/06/18 0957  hydrALAZINE (APRESOLINE) injection 5 mg  Every 10 Minutes PRN,   Status:  Discontinued      10/06/18 0957    10/06/18 0957  atropine sulfate injection 0.5 mg  Once As Needed,   Status:  Discontinued      10/06/18 0957    10/06/18 0957  ipratropium-albuterol (DUO-NEB) nebulizer solution 3 mL  Once As Needed,   Status:  Discontinued      10/06/18 0957    10/06/18 0957  naloxone (NARCAN) injection 0.04 mg  As Needed,   Status:  Discontinued      10/06/18 0957    10/06/18 0957  flumazenil (ROMAZICON) injection 0.2 mg  As Needed,   Status:  Discontinued      10/06/18 0957    10/06/18 0957  ondansetron (ZOFRAN) injection 4 mg  As Needed,   Status:  Discontinued      10/06/18 0957    10/06/18 0957  metoclopramide (REGLAN) injection 5 mg  Every 15 Minutes PRN,   Status:  Discontinued      10/06/18 0957    10/06/18 0957  meperidine (DEMEROL) injection 12.5 mg  Every 5 Minutes PRN,   Status:  Discontinued      10/06/18 0957    10/06/18 0937  sodium chloride (NS)  irrigation solution  As Needed,   Status:  Discontinued      10/06/18 0937    10/06/18 0936  iopamidol (ISOVUE-300) 61 % injection  As Needed,   Status:  Discontinued      10/06/18 0936    10/06/18 0936  sterile water irrigation solution  As Needed,   Status:  Discontinued      10/06/18 0936    10/06/18 0900  lamoTRIgine (LaMICtal) tablet 100 mg  2 Times Daily,   Status:  Discontinued      10/06/18 0748    10/06/18 0900  FLUoxetine (PROzac) capsule 40 mg  Every 12 Hours Scheduled,   Status:  Discontinued      10/06/18 0748    10/06/18 0748  Obtain Informed Consent  Once      10/06/18 0748    10/06/18 0744  Inpatient Admission  Once      10/06/18 0744    10/06/18 0716  Manual Differential  Once      10/06/18 0715    10/06/18 0644  Manual Differential  Once,   Status:  Canceled      10/06/18 0643    10/06/18 0600  CBC & Differential  Morning Draw      10/05/18 2201    10/06/18 0600  Comprehensive Metabolic Panel  Morning Draw      10/05/18 2201    10/06/18 0600  Uric Acid  Morning Draw      10/05/18 2201    10/06/18 0600  hCG, Serum, Qualitative  Morning Draw      10/05/18 2204    10/06/18 0600  CBC Auto Differential  PROCEDURE ONCE      10/06/18 0002    10/06/18 0001  NPO Diet  Diet Effective Midnight,   Status:  Canceled      10/05/18 2201    10/05/18 2300  sodium chloride 0.9 % infusion  Continuous      10/05/18 2201    10/05/18 2300  tamsulosin (FLOMAX) 24 hr capsule 0.4 mg  Once      10/05/18 2201    10/05/18 2200  zolpidem (AMBIEN) tablet 5 mg  Nightly PRN      10/05/18 2201    10/05/18 2200  oxyCODONE-acetaminophen (PERCOCET)  MG per tablet 1 tablet  Every 4 Hours PRN      10/05/18 2201    10/05/18 2159  oxyCODONE-acetaminophen (PERCOCET) 5-325 MG per tablet 1 tablet  Every 4 Hours PRN      10/05/18 2201    10/05/18 2159  HYDROmorphone (DILAUDID) injection solution 1 mg  Every 2 Hours PRN      10/05/18 2201    10/05/18 2158  HYDROmorphone (DILAUDID) injection 0.5 mg  Every 3 Hours PRN      10/05/18 2201     10/05/18 2158  ketorolac (TORADOL) injection 30 mg  Every 6 Hours PRN      10/05/18 2201    10/05/18 2158  ondansetron (ZOFRAN) injection 4 mg  Every 4 Hours PRN      10/05/18 2201    10/05/18 2156  Diet Regular  Diet Effective Now,   Status:  Canceled      10/05/18 2201    10/05/18 2156  Strain All Urine  Once      10/05/18 2201    03/03/16 0000  gabapentin (NEURONTIN) 400 MG capsule  3 Times Daily      10/06/18 0359    03/02/16 0000  FLUoxetine (PROzac) 40 MG capsule  Daily      10/06/18 0359    03/02/16 0000  lamoTRIgine (LaMICtal) 150 MG tablet  2 Times Daily      10/06/18 0359    03/02/16 0000  levonorgestrel-ethinyl estradiol (LUTERA) 0.1-20 MG-MCG per tablet  Daily      10/06/18 0359    Unscheduled  Up With Assistance  As Needed      10/05/18 2201    Signed and Held  Vital Signs - Per Anesthesia Protocol  As Needed,   Status:  Canceled      Signed and Held    Signed and Held  Oxygen Therapy- Nasal Cannula; Titrate for SPO2: equal to or greater than, 90%  Continuous,   Status:  Canceled      Signed and Held    Signed and Held  Pulse Oximetry, Continuous  Continuous,   Status:  Canceled      Signed and Held    Signed and Held  Insert Peripheral IV  Once,   Status:  Canceled      Signed and Held    Signed and Held  Saline Lock & Maintain IV Access  Continuous,   Status:  Canceled      Signed and Held    Signed and Held  sodium chloride 0.9 % flush 3 mL  Every 12 Hours Scheduled,   Status:  Canceled      Signed and Held    Signed and Held  sodium chloride 0.9 % flush 1-10 mL  As Needed,   Status:  Canceled      Signed and Held    Signed and Held  buffered lidocaine syringe 0.5 mL  Once As Needed,   Status:  Canceled      Signed and Held    Signed and Held  lactated ringers infusion  Continuous,   Status:  Canceled      Signed and Held    --  ARIPiprazole (ABILIFY) 5 MG tablet  Nightly      10/06/18 1216             Operative/Procedure Notes (all)      Tyrone Machuca MD at 10/6/2018  8:43 AM  Version 1 of 1          Operative Summary    Arin De La Cruz  Date of Procedure: 10/5/2018 - 10/6/2018    Pre-op Diagnosis:   -Left ureteral obstruction with hydronephrosis secondary to a left proximal ureteral calculus.  -Left acute pyelonephritis    Post-op Diagnosis:     Same    Procedure/CPT® Codes:      Procedure(s):  CYSTOSCOPY  LEFT RETROGRADE URETEROPYELOGRAM   LEFT URETERAL STENT INSERTION    Surgeon(s):  Tyrone Machuca MD    Anesthesia: General    Staff:   Circulator: Heidy Gallardo RN  Radiology Technologist: Keri Krishna  Scrub Person: Patrice Schultz  Other: Manuel Vega    Indications for procedure   left ureteral obstruction from a proximal calculus with suspected acute pyelonephritis    Findings:    #1.  Cystoscopy: The urethra is without evidence of mass or lesion.  The bladder itself shows no mucosal lesions, glomerulations or masses.  Ureteral orifices are normal in position and configuration.   #2.  Interpretation of left retrograde ureteropyelogram: The left ureter is normal in its course and caliber in the mid and distal aspects.  There is a clear transition change above a calcification that is noted approximately the L3 level of the ureter consistent with a stone.  Moderate hydronephrosis is noted above this with some calyceal extravasation the upper pole.      Procedure details:  After appropriate anesthesia, positioning, prep and drape, timeout protocol was observed.   22 Ethiopian cystoscope sheath with 30 and 70° lenses is used inspect the bladder.  The left orifice is cannulated the 5 Ethiopian cone-tipped catheter and half-strength contrast is injected.  The interpretation of this is listed above as well as the cystoscopic findings.    0.03 mm Sensor guidewire is then passed and manipulated past the stone up for left renal pelvis.  Significant purulent fluid was noted when the wire was passed.  5 Ethiopian open-ended catheters passed over this to obtain some fluid from left renal pelvis for  culture.  The wire is then replaced through the 5 Taiwanese catheter and the catheter removed.  This followed by placement of a 6 Taiwanese by 24 senna meter double-J ureteral stent curling one in the pelvis other than the bladder.  Patient tolerated this without difficulty.    Estimated Blood Loss: less than 30 mL    Specimens:                ID Type Source Tests Collected by Time   1 :  Body Fluid Kidney, Left BODY FLUID CULTURE Tyrone Machuca MD 10/6/2018 0954         Drains:   Ureteral Drain/Stent Left ureter  (Active)       Complications: none    Plan:  We will 8 urine culture results.  Patient will be left on Rocephin at this point.  Hopefully we can switch an oral antibiotic tomorrow and send the patient home.  She needs about 7-10 days.  Tract drainage before attempting ureteroscopy.    Please note that portions of this note were completed with a voice recognition program.)  Tyrone Machuca MD     Date: 10/6/2018  Time: 10:06 AM        Electronically signed by Tyrone Machuca MD at 10/6/2018 10:14 AM       Physician Progress Notes (all)     No notes of this type exist for this encounter.        Consult Notes (all)     No notes of this type exist for this encounter.

## 2018-10-07 NOTE — NURSING NOTE
"Dr. Machuca was on the floor to see the patient. Patient was off the floor. Overhead paged this patient however, patient continued to not show up for MD rounds. Called the patients cell and finally was able to contact the patient. Instructed the patient to return to the floor she states, \"I am ok. I just needed off the floor for a while.\" Explained to her that Dr. Machuca was on the floor to see her. She states she will return to the floor asap.   "

## 2018-10-07 NOTE — PLAN OF CARE
Problem: Patient Care Overview  Goal: Plan of Care Review  Outcome: Ongoing (interventions implemented as appropriate)  Continue to wait on urinary culture and monitor urine output   10/07/18 1709   Coping/Psychosocial   Plan of Care Reviewed With patient   Plan of Care Review   Progress no change       Problem: Pain, Acute (Adult)  Goal: Acceptable Pain Control/Comfort Level  Outcome: Ongoing (interventions implemented as appropriate)  Continue to require po pian medication about every 4 hours   10/07/18 1709   Pain, Acute (Adult)   Acceptable Pain Control/Comfort Level making progress toward outcome       Problem: Infection, Risk/Actual (Adult)  Goal: Identify Related Risk Factors and Signs and Symptoms  Outcome: Ongoing (interventions implemented as appropriate)  patient   10/07/18 1709   Infection, Risk/Actual (Adult)   Related Risk Factors (Infection, Risk/Actual) surgery/procedure   Signs and Symptoms (Infection, Risk/Actual) pain    with cystoscopy yesterday and patient had a lot of pus in kidney with procedure.  Continue iv antibiotics.  Urine remains orange in color and still cloudy  Goal: Infection Prevention/Resolution  Outcome: Ongoing (interventions implemented as appropriate)

## 2018-10-07 NOTE — PROGRESS NOTES
Urology  Length of Stay: 1  Patient Care Team:  Pedro Castellanos MD as PCP - General (Pediatrics)    Chief Complaint:  Follow-up ureteral stent placement    Subjective     Interval History:   Flank pain is improved.  Some irritative symptoms and hematuria from stent.    Review of Systems:   Review of Systems   Constitutional: Negative for chills and fever.   Gastrointestinal: Negative for abdominal pain, anal bleeding and blood in stool.   Genitourinary: Positive for pelvic pain and urgency. Negative for dysuria and hematuria.       Objective     Vital Signs  Temp:  [97.8 °F (36.6 °C)-98.4 °F (36.9 °C)] 97.9 °F (36.6 °C)  Heart Rate:  [55-87] 59  Resp:  [14-16] 16  BP: ()/(55-81) 147/81    Physical Exam:  Physical Exam  Constitutional:  They  appear well-developed and well-nourished. There are no obvious deformities. No distress.   Pulmonary/Chest: Effort normal.   GI: Soft. The patient exhibits no distension and no mass. There is no tenderness. There is no rebound and no guarding. No hernia.   Neurological: Patient is alert and oriented to person, place, and time.   Skin: Skin is warm and dry. Not diaphoretic.   Psychiatric:  normal mood and affect. Not agitated.        Results Review:       I reviewed the patient's new clinical results.  Lab Results (last 24 hours)     Procedure Component Value Units Date/Time    Urine Culture - Urine, [[id number]]  (Abnormal) Collected:  10/06/18 0954    Specimen:  Body Fluid from Kidney, Left Updated:  10/07/18 0836     Urine Culture Gram Negative Bacilli (A)        Imaging Results (last 24 hours)     Procedure Component Value Units Date/Time    FL Retrograde Pyelogram In OR [[id number]] Collected:  10/06/18 1034     Updated:  10/06/18 1038    Narrative:       INTRAOPERATIVE FLUOROSCOPIC GUIDANCE 10/6/2018      INDICATION: Intraoperative fluoroscopic guidance. Intraoperative images  related to left retrograde pyelogram and placement of double-J stent.      TECHNIQUE: 6  fluoroscopic images from the operating room were submitted  for evaluation. Please note, no radiologist was in attendance for  acquisition of these images. These images are available for future  reference to the attending surgeon. Total fluoroscopic time was 1.2  minutes.      FINDINGS: Intraoperative images related to placement of left sided  double-J stent.        Impression:       1. Intraoperative fluoroscopic guidance as described.   2. Please refer to real-time fluoroscopy and operative report for full  details.  This report was finalized on 10/06/2018 10:35 by Dr. Nakia Alvarado MD.          Medication Review:     Current Facility-Administered Medications:   •  ARIPiprazole (ABILIFY) tablet 5 mg, 5 mg, Oral, Nightly, Tyrone Machuca MD, 5 mg at 10/06/18 2135  •  cefTRIAXone (ROCEPHIN) 1 g/10mL IV PUSH syringe, 1 g, Intravenous, Q24H, Tyrone Machuca MD, 1 g at 10/06/18 1753  •  FLUoxetine (PROzac) capsule 40 mg, 40 mg, Oral, Daily, Tyrone Machuca MD, 40 mg at 10/07/18 0913  •  gabapentin (NEURONTIN) capsule 400 mg, 400 mg, Oral, TID, Tyrone Machuca MD, 400 mg at 10/07/18 0913  •  HYDROmorphone (DILAUDID) injection 0.5 mg, 0.5 mg, Intravenous, Q3H PRN, Tyrone Machuca MD, 0.5 mg at 10/07/18 0650  •  HYDROmorphone (DILAUDID) injection solution 1 mg, 1 mg, Intravenous, Q2H PRN, Tyrone Machuca MD, 1 mg at 10/05/18 2257  •  ketorolac (TORADOL) injection 30 mg, 30 mg, Intravenous, Q6H PRN, Tyrone Machuca MD, 30 mg at 10/06/18 2134  •  lamoTRIgine (LaMICtal) tablet 150 mg, 150 mg, Oral, BID, Tyrone Machuca MD, 150 mg at 10/07/18 0912  •  NON FORMULARY, 1 tablet, Oral, Daily, Tyrone Machuca MD  •  ondansetron (ZOFRAN) injection 4 mg, 4 mg, Intravenous, Q4H PRN, Tyrone Machuca MD  •  oxyCODONE-acetaminophen (PERCOCET)  MG per tablet 1 tablet, 1 tablet, Oral, Q4H PRN, Tyrone Machuca MD, 1 tablet at 10/07/18 0755  •  oxyCODONE-acetaminophen (PERCOCET) 5-325 MG per tablet 1 tablet, 1  tablet, Oral, Q4H PRN, Tyrone Machuca MD  •  sodium chloride 0.9 % flush 3 mL, 3 mL, Intravenous, Q12H, Tyrone Machuca MD  •  sodium chloride 0.9 % flush 3-10 mL, 3-10 mL, Intravenous, PRN, Tyrone Machuca MD  •  sodium chloride 0.9 % infusion, 150 mL/hr, Intravenous, Continuous, Tyrone Machuca MD, Last Rate: 150 mL/hr at 10/07/18 0028, 150 mL/hr at 10/07/18 0028  •  zolpidem (AMBIEN) tablet 5 mg, 5 mg, Oral, Nightly PRN, Tyrone Machuca MD, 5 mg at 10/06/18 1190    Assessment/Plan:   #1. Left ureteral obstruction with hydronephrosis secondary to a proximal ureteral calculus - s/p Ureteral stent. Plan definitive management of stone after antibiotic course complete.   #2. Acute pyelonephritis - Improving on ceftriaxone and with stent. Await culture results. Possibly home tomorrow on po antibiotics.       (Please note that portions of this note were completed with a voice recognition program.)  Tyrone Machuca MD  10/07/18  10:00 AM

## 2018-10-08 VITALS
HEART RATE: 68 BPM | RESPIRATION RATE: 16 BRPM | TEMPERATURE: 97.6 F | HEIGHT: 66 IN | WEIGHT: 162.4 LBS | DIASTOLIC BLOOD PRESSURE: 88 MMHG | OXYGEN SATURATION: 99 % | BODY MASS INDEX: 26.1 KG/M2 | SYSTOLIC BLOOD PRESSURE: 150 MMHG

## 2018-10-08 LAB
BACTERIA SPEC AEROBE CULT: ABNORMAL
BLOOD CULTURE, ROUTINE: ABNORMAL
BLOOD CULTURE, ROUTINE: ABNORMAL
CULTURE, BLOOD 2: ABNORMAL
CULTURE, BLOOD 2: ABNORMAL
ORGANISM: ABNORMAL
ORGANISM: ABNORMAL

## 2018-10-08 PROCEDURE — 99238 HOSP IP/OBS DSCHRG MGMT 30/<: CPT | Performed by: UROLOGY

## 2018-10-08 PROCEDURE — 25010000002 KETOROLAC TROMETHAMINE PER 15 MG: Performed by: UROLOGY

## 2018-10-08 RX ORDER — HYDROCODONE BITARTRATE AND ACETAMINOPHEN 5; 325 MG/1; MG/1
1 TABLET ORAL EVERY 6 HOURS PRN
Qty: 24 TABLET | Refills: 0 | Status: SHIPPED | OUTPATIENT
Start: 2018-10-08 | End: 2018-10-14

## 2018-10-08 RX ORDER — CEPHALEXIN 500 MG/1
500 CAPSULE ORAL 3 TIMES DAILY
Qty: 30 CAPSULE | Refills: 0 | Status: SHIPPED | OUTPATIENT
Start: 2018-10-08 | End: 2018-10-18

## 2018-10-08 RX ORDER — PHENAZOPYRIDINE HYDROCHLORIDE 200 MG/1
200 TABLET, FILM COATED ORAL 3 TIMES DAILY PRN
Qty: 21 TABLET | Refills: 0 | Status: SHIPPED | OUTPATIENT
Start: 2018-10-08 | End: 2018-10-15

## 2018-10-08 RX ORDER — TAMSULOSIN HYDROCHLORIDE 0.4 MG/1
1 CAPSULE ORAL NIGHTLY
Qty: 30 CAPSULE | Refills: 0 | Status: SHIPPED | OUTPATIENT
Start: 2018-10-08 | End: 2018-11-07

## 2018-10-08 RX ADMIN — FLUOXETINE HYDROCHLORIDE 40 MG: 20 CAPSULE ORAL at 08:04

## 2018-10-08 RX ADMIN — KETOROLAC TROMETHAMINE 30 MG: 30 INJECTION, SOLUTION INTRAMUSCULAR at 00:53

## 2018-10-08 RX ADMIN — GABAPENTIN 400 MG: 400 CAPSULE ORAL at 08:03

## 2018-10-08 RX ADMIN — OXYCODONE HYDROCHLORIDE AND ACETAMINOPHEN 1 TABLET: 10; 325 TABLET ORAL at 10:28

## 2018-10-08 RX ADMIN — LAMOTRIGINE 150 MG: 100 TABLET ORAL at 08:04

## 2018-10-08 RX ADMIN — Medication 3 ML: at 08:04

## 2018-10-08 RX ADMIN — OXYCODONE HYDROCHLORIDE AND ACETAMINOPHEN 1 TABLET: 10; 325 TABLET ORAL at 15:31

## 2018-10-08 RX ADMIN — GABAPENTIN 400 MG: 400 CAPSULE ORAL at 15:31

## 2018-10-08 RX ADMIN — OXYCODONE HYDROCHLORIDE AND ACETAMINOPHEN 1 TABLET: 10; 325 TABLET ORAL at 06:01

## 2018-10-08 NOTE — PAYOR COMM NOTE
"Jody Brandt (37 y.o. Female) [id number]   [facility] phone   Fax        Awaiting   Progress note  Or d/c  Summary           Date of Birth Social Security Number Address Home Phone MRN    1980  [id number] [facility] [address]  [id number]    [employer] Marital Status          Rastafari        Admission Date Admission Type Admitting Provider Attending Provider Department, Room/Bed    10/5/18 Urgent Tyrone Machuca MD Spicer, Donald L, MD [facility] 3C, 375/1    Discharge Date Discharge Disposition Discharge Destination         Home or Self Care              Attending Provider:  Tyrone Machuca MD    Allergies:  No Known Allergies    Isolation:  None   Infection:  None   Code Status:  CPR    Ht:  167.6 cm (66\")   Wt:  73.7 kg (162 lb 6.4 oz)    Admission Cmt:  None   Principal Problem:  None                Active Insurance as of 10/5/2018     Primary Coverage     Payor Plan Insurance Group Employer/Plan Group    [employer] MEDICAID [employer] [id number]     Payor Plan Address Payor Plan Phone Number Effective From Effective To    PO  [phone] 10/5/2018     [facility] [id number]       Subscriber Name Subscriber Birth Date Member ID       JODY BRANDT 1980 [id number]                 Emergency Contacts      (Rel.) Home Phone Work Phone Mobile Phone    Bull Montero (Significant Other) [phone] -- --    Ron Nunez (Other) [phone] -- --        Stacey Feliciano, RN Registered Nurse Signed   Plan of Care Date of Service: 10/7/2018  5:12 PM         Problem: Patient Care Overview  Goal: Plan of Care Review  Outcome: Ongoing (interventions implemented as appropriate)  Continue to wait on urinary culture and monitor urine output    10/07/18 9527   Coping/Psychosocial   Plan of Care Reviewed With patient   Plan of Care Review   Progress no change         Problem: Pain, Acute (Adult)  Goal: " Acceptable Pain Control/Comfort Level  Outcome: Ongoing (interventions implemented as appropriate)  Continue to require po pian medication about every 4 hours    10/07/18 1709   Pain, Acute (Adult)   Acceptable Pain Control/Comfort Level making progress toward outcome         Problem: Infection, Risk/Actual (Adult)  Goal: Identify Related Risk Factors and Signs and Symptoms  Outcome: Ongoing (interventions implemented as appropriate)  patient    10/07/18 1709   Infection, Risk/Actual (Adult)   Related Risk Factors (Infection, Risk/Actual) surgery/procedure   Signs and Symptoms (Infection, Risk/Actual) pain    with cystoscopy yesterday and patient had a lot of pus in kidney with procedure.  Continue iv antibiotics.  Urine remains orange in color and still cloudy  Goal: Infection Prevention/Resolution  Outcome: Ongoing (interventions implemented as appropriate)                 Radha Stack RN Registered Nurse Signed   Plan of Care Date of Service: 10/8/2018  3:17 AM         Problem: Patient Care Overview  Goal: Plan of Care Review  Outcome: Ongoing (interventions implemented as appropriate)    10/08/18 0241   Coping/Psychosocial   Plan of Care Reviewed With patient;spouse   Plan of Care Review   Progress no change      Goal: Individualization and Mutuality  Outcome: Ongoing (interventions implemented as appropriate)     Goal: Discharge Needs Assessment  Outcome: Ongoing (interventions implemented as appropriate)     Goal: Interprofessional Rounds/Family Conf  Outcome: Ongoing (interventions implemented as appropriate)        Problem: Pain, Acute (Adult)  Goal: Identify Related Risk Factors and Signs and Symptoms  Outcome: Ongoing (interventions implemented as appropriate)     Goal: Acceptable Pain Control/Comfort Level  Outcome: Ongoing (interventions implemented as appropriate)        Problem: Infection, Risk/Actual (Adult)  Goal: Identify Related Risk Factors and Signs and Symptoms  Outcome: Ongoing  (interventions implemented as appropriate)     Goal: Infection Prevention/Resolution  Outcome: Ongoing (interventions implemented as appropriate)                     Hospital Medications (active)       Dose Frequency Start End    ARIPiprazole (ABILIFY) tablet 5 mg 5 mg Nightly 10/6/2018     Sig - Route: Take 1 tablet by mouth Every Night. - Oral    cefTRIAXone (ROCEPHIN) 1 g/10mL IV PUSH syringe 1 g Every 24 Hours 10/6/2018 10/13/2018    Sig - Route: Infuse 10 mL into a venous catheter Daily. - Intravenous    FLUoxetine (PROzac) capsule 40 mg 40 mg Daily 10/6/2018     Sig - Route: Take 2 capsules by mouth Daily. - Oral    gabapentin (NEURONTIN) capsule 400 mg 400 mg 3 Times Daily 10/6/2018     Sig - Route: Take 1 capsule by mouth 3 (Three) Times a Day. - Oral    ketorolac (TORADOL) injection 30 mg 30 mg Every 6 Hours PRN 10/5/2018 10/10/2018    Sig - Route: Infuse 1 mL into a venous catheter Every 6 (Six) Hours As Needed for Moderate Pain . - Intravenous    lamoTRIgine (LaMICtal) tablet 150 mg 150 mg 2 Times Daily 10/6/2018     Sig - Route: Take 150 mg by mouth 2 (Two) Times a Day. - Oral    NON FORMULARY 1 tablet Daily 10/7/2018     Sig - Route: Take 1 tablet by mouth Daily. - Oral    Notes to Pharmacy: Home medication Lutera    ondansetron (ZOFRAN) injection 4 mg 4 mg Every 4 Hours PRN 10/5/2018     Sig - Route: Infuse 2 mL into a venous catheter Every 4 (Four) Hours As Needed for Nausea or Vomiting. - Intravenous    oxyCODONE-acetaminophen (PERCOCET)  MG per tablet 1 tablet 1 tablet Every 4 Hours PRN 10/5/2018 10/15/2018    Sig - Route: Take 1 tablet by mouth Every 4 (Four) Hours As Needed for Severe Pain . - Oral    oxyCODONE-acetaminophen (PERCOCET) 5-325 MG per tablet 1 tablet 1 tablet Every 4 Hours PRN 10/5/2018 10/15/2018    Sig - Route: Take 1 tablet by mouth Every 4 (Four) Hours As Needed for Moderate Pain . - Oral    sodium chloride 0.9 % flush 3 mL 3 mL Every 12 Hours Scheduled 10/7/2018     Sig -  Route: Infuse 3 mL into a venous catheter Every 12 (Twelve) Hours. - Intravenous    sodium chloride 0.9 % flush 3-10 mL 3-10 mL As Needed 10/7/2018     Sig - Route: Infuse 3-10 mL into a venous catheter As Needed for Line Care. - Intravenous    sodium chloride 0.9 % infusion 150 mL/hr Continuous 10/5/2018     Sig - Route: Infuse 150 mL/hr into a venous catheter Continuous. - Intravenous    zolpidem (AMBIEN) tablet 5 mg 5 mg Nightly PRN 10/5/2018 10/15/2018    Sig - Route: Take 1 tablet by mouth At Night As Needed for Sleep. - Oral          Discharge Order     Start     Ordered    10/08/18 1220  Discharge patient  Once     Expected Discharge Date:  10/08/18    Discharge Disposition:  Home or Self Care    Physician of Record for Attribution - Please select from Treatment Team:  SHWETA MEJIA [[id number]]    Review needed by CMO to determine Physician of Record:  No       Question Answer Comment   Physician of Record for Attribution - Please select from Treatment Team SHWETA MEJIA    Review needed by CMO to determine Physician of Record No        10/08/18 1226

## 2018-10-08 NOTE — PLAN OF CARE
Problem: Patient Care Overview  Goal: Plan of Care Review  Outcome: Ongoing (interventions implemented as appropriate)   10/08/18 0241   Coping/Psychosocial   Plan of Care Reviewed With patient;spouse   Plan of Care Review   Progress no change     Goal: Individualization and Mutuality  Outcome: Ongoing (interventions implemented as appropriate)    Goal: Discharge Needs Assessment  Outcome: Ongoing (interventions implemented as appropriate)    Goal: Interprofessional Rounds/Family Conf  Outcome: Ongoing (interventions implemented as appropriate)      Problem: Pain, Acute (Adult)  Goal: Identify Related Risk Factors and Signs and Symptoms  Outcome: Ongoing (interventions implemented as appropriate)    Goal: Acceptable Pain Control/Comfort Level  Outcome: Ongoing (interventions implemented as appropriate)      Problem: Infection, Risk/Actual (Adult)  Goal: Identify Related Risk Factors and Signs and Symptoms  Outcome: Ongoing (interventions implemented as appropriate)    Goal: Infection Prevention/Resolution  Outcome: Ongoing (interventions implemented as appropriate)

## 2018-10-09 ENCOUNTER — READMISSION MANAGEMENT (OUTPATIENT)
Dept: CALL CENTER | Facility: HOSPITAL | Age: 38
End: 2018-10-09

## 2018-10-09 NOTE — OUTREACH NOTE
Prep Survey      Responses   Facility patient discharged from?  [place]   Is patient eligible?  Yes   Discharge diagnosis  Hydronephrosis with urinary obstruction due to ureteral calculus   Does the patient have one of the following disease processes/diagnoses(primary or secondary)?  Other   Does the patient have Home health ordered?  No   Is there a DME ordered?  No   Prep survey completed?  Yes          Dania Cristina RN

## 2018-10-09 NOTE — PAYOR COMM NOTE
"[facility] 10-8-18    [id number]  Jody Brandt (37 y.o. Female)     Date of Birth Social Security Number Address Home Phone MRN    1980  [id number] [address]  [id number]    Taoism Marital Status          Confucianism        Admission Date Admission Type Admitting Provider Attending Provider Department, Room/Bed    10/5/18 Urgent Tyrone Machuca MD  [facility] 3C, 375/1    Discharge Date Discharge Disposition Discharge Destination        10/8/2018 Home or Self Care              Attending Provider:  (none)   Allergies:  No Known Allergies    Isolation:  None   Infection:  None   Code Status:  Prior    Ht:  167.6 cm (66\")   Wt:  73.7 kg (162 lb 6.4 oz)    Admission Cmt:  None   Principal Problem:  None                Active Insurance as of 10/5/2018     Primary Coverage     Payor Plan Insurance Group Employer/Plan Group    HU[employer] MEDICAID HU[employer] [id number]     Payor Plan Address Payor Plan Phone Number Effective From Effective To    PO  [phone] 10/5/2018     [facility] [id number]       Subscriber Name Subscriber Birth Date Member ID       JODY BRANDT 1980 [id number]                 Emergency Contacts      (Rel.) Home Phone Work Phone Mobile Phone    Bull Montero (Significant Other) [phone] -- --    Ron Nunez (Other) [phone] -- --            Discharge Summary     No notes of this type exist for this encounter.        "

## 2018-10-11 ENCOUNTER — TELEPHONE (OUTPATIENT)
Dept: UROLOGY | Facility: CLINIC | Age: 38
End: 2018-10-11

## 2018-10-11 ENCOUNTER — PREP FOR SURGERY (OUTPATIENT)
Dept: OTHER | Facility: HOSPITAL | Age: 38
End: 2018-10-11

## 2018-10-11 DIAGNOSIS — N20.1 URETERAL CALCULUS, LEFT: Primary | ICD-10-CM

## 2018-10-11 RX ORDER — SODIUM CHLORIDE, SODIUM LACTATE, POTASSIUM CHLORIDE, CALCIUM CHLORIDE 600; 310; 30; 20 MG/100ML; MG/100ML; MG/100ML; MG/100ML
100 INJECTION, SOLUTION INTRAVENOUS CONTINUOUS
Status: CANCELLED | OUTPATIENT
Start: 2018-10-11

## 2018-10-11 NOTE — TELEPHONE ENCOUNTER
CALLED AND SPOKE WITH JOVON, GAVE HIM BOTH DATES AND TIMES FOR THE PROCEDURE AND PREOP,  (THEY ONLY HAVE ONE WORKING PHONE RIGHT NOW).   ----- Message from Tyrone Machuca MD sent at 10/11/2018  6:17 AM CDT -----  Regarding: Case request   Please note that I sent a case request to put her on for ureteroscopy on Wednesday, October 17

## 2018-10-11 NOTE — DISCHARGE SUMMARY
Date of Discharge:  10/11/2018    Discharge Diagnosis:   #1.  Left proximal ureteral calculus with obstruction/hydronephrosis  #2.  Acute pyelonephritis-complicated due to obstruction    Presenting Problem/History of Present Illness  Hydronephrosis with urinary obstruction due to ureteral calculus [N13.2]       Hospital Course  Patient is a 37 y.o. female presented with flank pain and fever from a left proximal ureteral calculus.  She was started on ceftriaxone intravenously.  She was taken to the operating room where she underwent cystoscopy left ureteral stent.  Purulent drainage is noted from the upper tract.  She improved after IV antibiotics and upper tract drainage.  Culture grew an Escherichia coli that was resistant to ampicillin, Bactrim and gentamicin..      Procedures Performed  Procedure(s):  CYSTOSCOPY LEFT URETERAL STENT INSERTION       Consults:   Consults     No orders found from 9/6/2018 to 10/6/2018.          Condition on Discharge:  Improved    Vital Signs         Discharge Disposition  Home or Self Care    Discharge Medications     Discharge Medications      New Medications      Instructions Start Date   cephalexin 500 MG capsule  Commonly known as:  KEFLEX   500 mg, Oral, 3 Times Daily      HYDROcodone-acetaminophen 5-325 MG per tablet  Commonly known as:  NORCO   1 tablet, Oral, Every 6 Hours PRN      phenazopyridine 200 MG tablet  Commonly known as:  PYRIDIUM   200 mg, Oral, 3 Times Daily PRN      tamsulosin 0.4 MG capsule 24 hr capsule  Commonly known as:  FLOMAX   1 capsule, Oral, Nightly         Continue These Medications      Instructions Start Date   ARIPiprazole 5 MG tablet  Commonly known as:  ABILIFY   5 mg, Oral, Nightly      FLUoxetine 40 MG capsule  Commonly known as:  PROzac   40 capsules, Oral, Daily      gabapentin 400 MG capsule  Commonly known as:  NEURONTIN   400 mg, Oral, 3 Times Daily      lamoTRIgine 150 MG tablet  Commonly known as:  LaMICtal   150 mg, Oral, 2 Times  Daily      LUTERA 0.1-20 MG-MCG per tablet  Generic drug:  levonorgestrel-ethinyl estradiol   1 tablet, Oral, Daily             Discharge Diet:   Diet Instructions     Diet: Regular       Discharge Diet:  Regular          Activity at Discharge:   Activity Instructions     Discharge Activity Restrictions       1) No driving for 1 day and no longer taking narcotics.   2) Return to school / work in 3 days.  3) May shower / sponge bathe today  4) Do not lift / push / pull more then 10 lbs for 48 hours.          Follow-up Appointments  No future appointments.  Additional Instructions for the Follow-ups that You Need to Schedule     Call MD With Problems / Concerns    As directed      Instructions: For temperature greater 101, inability to urinate, persistent nausea with vomiting.  You should expect blood-tinged urine for up to 2 weeks intermittently.  Contact us if having difficulty urinating due to the size of blood clots.    Order Comments:  Instructions: For temperature greater 101, inability to urinate, persistent nausea with vomiting.  You should expect blood-tinged urine for up to 2 weeks intermittently.  Contact us if having difficulty urinating due to the size of blood clots.          Discharge Follow-up with Specified Provider: My office will arrange her follow-up because she will not be seen in the office until after I removed the stone next week.    As directed      To:  My office will arrange her follow-up because she will not be seen in the office until after I removed the stone next week.               Test Results Pending at Discharge       Tyrone Machuca MD  10/11/18  6:11 AM    Time: Discharge 15 min

## 2018-10-12 ENCOUNTER — READMISSION MANAGEMENT (OUTPATIENT)
Dept: CALL CENTER | Facility: HOSPITAL | Age: 38
End: 2018-10-12

## 2018-10-12 NOTE — OUTREACH NOTE
Medical Week 1 Survey      Responses   Facility patient discharged from?  [facility]   Does the patient have one of the following disease processes/diagnoses(primary or secondary)?  Other   Is there a successful TCM telephone encounter documented?  No   Week 1 attempt successful?  Yes   Call start time  0920   Rescheduled  Rescheduled-pt requested   Call end time  0921          Radha Vargas RN

## 2018-10-15 ENCOUNTER — APPOINTMENT (OUTPATIENT)
Dept: PREADMISSION TESTING | Facility: HOSPITAL | Age: 38
End: 2018-10-15

## 2018-10-15 VITALS
HEART RATE: 69 BPM | HEIGHT: 66 IN | RESPIRATION RATE: 15 BRPM | BODY MASS INDEX: 25.94 KG/M2 | DIASTOLIC BLOOD PRESSURE: 82 MMHG | SYSTOLIC BLOOD PRESSURE: 150 MMHG | WEIGHT: 161.38 LBS | OXYGEN SATURATION: 97 %

## 2018-10-15 DIAGNOSIS — N20.1 URETERAL CALCULUS, LEFT: ICD-10-CM

## 2018-10-15 LAB
ANION GAP SERPL CALCULATED.3IONS-SCNC: 9 MMOL/L (ref 4–13)
BACTERIA UR QL AUTO: ABNORMAL /HPF
BILIRUB UR QL STRIP: NEGATIVE
BUN BLD-MCNC: 6 MG/DL (ref 5–21)
BUN/CREAT SERPL: 6.5 (ref 7–25)
CALCIUM SPEC-SCNC: 9.9 MG/DL (ref 8.4–10.4)
CHLORIDE SERPL-SCNC: 101 MMOL/L (ref 98–110)
CLARITY UR: ABNORMAL
CO2 SERPL-SCNC: 30 MMOL/L (ref 24–31)
COD CRY URNS QL: ABNORMAL /HPF
COLOR UR: ABNORMAL
CREAT BLD-MCNC: 0.93 MG/DL (ref 0.5–1.4)
DEPRECATED RDW RBC AUTO: 41.5 FL (ref 40–54)
ERYTHROCYTE [DISTWIDTH] IN BLOOD BY AUTOMATED COUNT: 12.9 % (ref 12–15)
GFR SERPL CREATININE-BSD FRML MDRD: 69 ML/MIN/1.73
GLUCOSE BLD-MCNC: 103 MG/DL (ref 70–100)
GLUCOSE UR STRIP-MCNC: NEGATIVE MG/DL
HCT VFR BLD AUTO: 39.3 % (ref 37–47)
HGB BLD-MCNC: 13 G/DL (ref 12–16)
HGB UR QL STRIP.AUTO: ABNORMAL
HYALINE CASTS UR QL AUTO: ABNORMAL /LPF
KETONES UR QL STRIP: ABNORMAL
LEUKOCYTE ESTERASE UR QL STRIP.AUTO: ABNORMAL
MCH RBC QN AUTO: 29.3 PG (ref 28–32)
MCHC RBC AUTO-ENTMCNC: 33.1 G/DL (ref 33–36)
MCV RBC AUTO: 88.7 FL (ref 82–98)
NITRITE UR QL STRIP: NEGATIVE
PH UR STRIP.AUTO: <=5 [PH] (ref 5–8)
PLATELET # BLD AUTO: 351 10*3/MM3 (ref 130–400)
PMV BLD AUTO: 8.7 FL (ref 6–12)
POTASSIUM BLD-SCNC: 4.2 MMOL/L (ref 3.5–5.3)
PROT UR QL STRIP: ABNORMAL
RBC # BLD AUTO: 4.43 10*6/MM3 (ref 4.2–5.4)
RBC # UR: ABNORMAL /HPF
REF LAB TEST METHOD: ABNORMAL
SODIUM BLD-SCNC: 140 MMOL/L (ref 135–145)
SP GR UR STRIP: 1.02 (ref 1–1.03)
SQUAMOUS #/AREA URNS HPF: ABNORMAL /HPF
TRICHOMONAS #/AREA URNS HPF: ABNORMAL /HPF
UROBILINOGEN UR QL STRIP: ABNORMAL
WBC NRBC COR # BLD: 9.87 10*3/MM3 (ref 4.8–10.8)
WBC UR QL AUTO: ABNORMAL /HPF

## 2018-10-15 PROCEDURE — 87086 URINE CULTURE/COLONY COUNT: CPT | Performed by: UROLOGY

## 2018-10-15 PROCEDURE — 81001 URINALYSIS AUTO W/SCOPE: CPT | Performed by: UROLOGY

## 2018-10-15 PROCEDURE — 80048 BASIC METABOLIC PNL TOTAL CA: CPT | Performed by: UROLOGY

## 2018-10-15 PROCEDURE — 85027 COMPLETE CBC AUTOMATED: CPT | Performed by: UROLOGY

## 2018-10-15 PROCEDURE — 36415 COLL VENOUS BLD VENIPUNCTURE: CPT

## 2018-10-15 NOTE — DISCHARGE INSTRUCTIONS
DAY OF SURGERY INSTRUCTIONS        YOUR SURGEON: Dr. Machuca    PROCEDURE: URETEROSCOPY LASER LITHOTRIPSY WITH STENT INSERTION LEFT    DATE OF SURGERY: October 17, 2018    ARRIVAL TIME: AS DIRECTED BY OFFICE    DAY OF SURGERY TAKE ONLY THESE MEDICATIONS UNLESS OTHERWISE INSTRUCTED BY YOUR PHYSICIAN: Lamictal.          MANAGING PAIN AFTER SURGERY    We know you are probably wondering what your pain will be like after surgery.  Following surgery it is unrealistic to expect you will not have pain.   Pain is how our bodies let us know that something is wrong or cautions us to be careful.  That said, our goal is to make your pain tolerable.    Methods we may use to treat your pain include (oral or IV medications, PCAs, epidurals, nerve blocks, etc.)   While some procedures require IV pain medications for a short time after surgery, transitioning to pain medications by mouth allows for better management of pain.   Your nurse will encourage you to take oral pain medications whenever possible.  IV medications work almost immediately, but only last a short while.  Taking medications by mouth allows for a more constant level of medication in your blood stream for a longer period of time.      Once your pain is out of control it is harder to get back under control.  It is important you are aware when your next dose of pain medication is due.  If you are admitted, your nurse may write the time of your next dose on the white board in your room to help you remember.      We are interested in your pain and encourage you to inform us about aggravating factors during your visit.   Many times a simple repositioning every few hours can make a big difference.    If your physician says it is okay, do not let your pain prevent you from getting out of bed. Be sure to call your nurse for assistance prior to getting up so you do not fall.      Before surgery, please decide your tolerable pain goal.  These faces help describe the pain ratings  we use on a 0-10 scale.   Be prepared to tell us your goal and whether or not you take pain or anxiety medications at home.            BEFORE YOU COME TO THE HOSPITAL  (Pre-op instructions)  • Do not eat, drink, smoke or chew gum after midnight the night before surgery.  This also includes no mints.  • Morning of surgery take only the medicines you have been instructed with a sip of water unless otherwise instructed  by your physician.  • Do not shave, wear makeup or dark nail polish.  • Remove all jewelry including rings.  • Leave anything you consider valuable at home.  • Leave your suitcase in the car until after your surgery.  • Bring the following with you if applicable:  o Picture ID and insurance, Medicare or Medicaid cards  o Co-pay/deductible required by insurance (cash, check, credit card)  o Copy of advance directive, living will or power-of- documents if not brought to PAT  o CPAP or BIPAP mask and tubing  o Relaxation aids (MP3 player, book, magazine)  • On the day of surgery check in at registration located at the main entrance of the hospital.       Outpatient Surgery Guidelines, Adult  Outpatient procedures are those for which the person having the procedure is allowed to go home the same day as the procedure. Various procedures are done on an outpatient basis. You should follow some general guidelines if you will be having an outpatient procedure.  LET YOUR HEALTH CARE PROVIDER KNOW ABOUT:  · Any allergies you have.  · All medicines you are taking, including vitamins, herbs, eye drops, creams, and over-the-counter medicines.  · Previous problems you or members of your family have had with the use of anesthetics.  · Any blood disorders you have.  · Previous surgeries you have had.  · Medical conditions you have.  RISKS AND COMPLICATIONS  Your health care provider will discuss possible risks and complications with you before surgery. Common risks and complications include:    · Problems due to  the use of anesthetics.  · Blood loss and replacement (does not apply to minor surgical procedures).  · Temporary increase in pain due to surgery.  · Uncorrected pain or problems that the surgery was meant to correct.  · Infection.  · New damage.  BEFORE THE PROCEDURE  · Ask your health care provider about changing or stopping your regular medicines. You may need to stop taking certain medicines in the days or weeks before the procedure.  · Stop smoking at least 2 weeks before surgery. This lowers your risk for complications during and after surgery. Ask your health care provider for help with this if needed.  · Eat your usual meals and a light supper the day before surgery. Continue fluid intake. Do not drink alcohol.  · Do not eat or drink after midnight the night before your surgery.   · Arrange for someone to take you home and to stay with you for 24 hours after the procedure. Medicine given for your procedure may affect your ability to drive or to care for yourself.  · Call your health care provider's office if you develop an illness or problem that may prevent you from safely having your procedure.  AFTER THE PROCEDURE  After surgery, you will be taken to a recovery area, where your progress will be monitored. If there are no complications, you will be allowed to go home when you are awake, stable, and taking fluids well. You may have numbness around the surgical site. Healing will take some time. You will have tenderness at the surgical site and may have some swelling and bruising. You may also have some nausea.  HOME CARE INSTRUCTIONS  · Do not drive for 24 hours, or as directed by your health care provider. Do not drive while taking prescription pain medicines.  · Do not drink alcohol for 24 hours.  · Do not make important decisions or sign legal documents for 24 hours.  · You may resume a normal diet and activities as directed.  · Do not lift anything heavier than 10 pounds (4.5 kg) or play contact sports  until your health care provider says it is okay.  · Change your bandages (dressings) as directed.  · Only take over-the-counter or prescription medicines as directed by your health care provider.  · Follow up with your health care provider as directed.  SEEK MEDICAL CARE IF:  · You have increased bleeding (more than a small spot) from the surgical site.  · You have redness, swelling, or increasing pain in the wound.  · You see pus coming from the wound.  · You have a fever.  · You notice a bad smell coming from the wound or dressing.  · You feel lightheaded or faint.  · You develop a rash.  · You have trouble breathing.  · You develop allergies.  MAKE SURE YOU:  · Understand these instructions.  · Will watch your condition.  · Will get help right away if you are not doing well or get worse.     This information is not intended to replace advice given to you by your health care provider. Make sure you discuss any questions you have with your health care provider.     Document Released: 09/12/2002 Document Revised: 05/03/2016 Document Reviewed: 05/22/2014  [employer] Interactive Patient Education ©2016 [employer]       Fall Prevention in Hospitals, Adult  As a hospital patient, your condition and the treatments you receive can increase your risk for falls. Some additional risk factors for falls in a hospital include:  · Being in an unfamiliar environment.  · Being on bed rest.  · Your surgery.  · Taking certain medicines.  · Your tubing requirements, such as intravenous (IV) therapy or catheters.  It is important that you learn how to decrease fall risks while at the hospital. Below are important tips that can help prevent falls.  SAFETY TIPS FOR PREVENTING FALLS  Talk about your risk of falling.  · Ask your health care provider why you are at risk for falling. Is it your medicine, illness, tubing placement, or something else?  · Make a plan with your health care provider to keep you safe from falls.  · Ask your  health care provider or pharmacist about side effects of your medicines. Some medicines can make you dizzy or affect your coordination.  Ask for help.  · Ask for help before getting out of bed. You may need to press your call button.  · Ask for assistance in getting safely to the toilet.  · Ask for a walker or cane to be put at your bedside. Ask that most of the side rails on your bed be placed up before your health care provider leaves the room.  · Ask family or friends to sit with you.  · Ask for things that are out of your reach, such as your glasses, hearing aids, telephone, bedside table, or call button.  Follow these tips to avoid falling:  · Stay lying or seated, rather than standing, while waiting for help.  · Wear rubber-soled slippers or shoes whenever you walk in the hospital.  · Avoid quick, sudden movements.  ¨ Change positions slowly.  ¨ Sit on the side of your bed before standing.  ¨ Stand up slowly and wait before you start to walk.  · Let your health care provider know if there is a spill on the floor.  · Pay careful attention to the medical equipment, electrical cords, and tubes around you.  · When you need help, use your call button by your bed or in the bathroom. Wait for one of your health care providers to help you.  · If you feel dizzy or unsure of your footing, return to bed and wait for assistance.  · Avoid being distracted by the TV, telephone, or another person in your room.  · Do not lean or support yourself on rolling objects, such as IV poles or bedside tables.     This information is not intended to replace advice given to you by your health care provider. Make sure you discuss any questions you have with your health care provider.     Document Released: 12/15/2001 Document Revised: 01/08/2016 Document Reviewed: 08/25/2013  [employer] Interactive Patient Education ©2016 [employer]       Surgical Site Infections FAQs  What is a Surgical Site Infection (SSI)?  A surgical site infection  is an infection that occurs after surgery in the part of the body where the surgery took place. Most patients who have surgery do not develop an infection. However, infections develop in about 1 to 3 out of every 100 patients who have surgery.  Some of the common symptoms of a surgical site infection are:  · Redness and pain around the area where you had surgery  · Drainage of cloudy fluid from your surgical wound  · Fever  Can SSIs be treated?  Yes. Most surgical site infections can be treated with antibiotics. The antibiotic given to you depends on the bacteria (germs) causing the infection. Sometimes patients with SSIs also need another surgery to treat the infection.  What are some of the things that hospitals are doing to prevent SSIs?  To prevent SSIs, doctors, nurses, and other healthcare providers:  · Clean their hands and arms up to their elbows with an antiseptic agent just before the surgery.  · Clean their hands with soap and water or an alcohol-based hand rub before and after caring for each patient.  · May remove some of your hair immediately before your surgery using electric clippers if the hair is in the same area where the procedure will occur. They should not shave you with a razor.  · Wear special hair covers, masks, gowns, and gloves during surgery to keep the surgery area clean.  · Give you antibiotics before your surgery starts. In most cases, you should get antibiotics within 60 minutes before the surgery starts and the antibiotics should be stopped within 24 hours after surgery.  · Clean the skin at the site of your surgery with a special soap that kills germs.  What can I do to help prevent SSIs?  Before your surgery:  · Tell your doctor about other medical problems you may have. Health problems such as allergies, diabetes, and obesity could affect your surgery and your treatment.  · Quit smoking. Patients who smoke get more infections. Talk to your doctor about how you can quit before your  surgery.  · Do not shave near where you will have surgery. Shaving with a razor can irritate your skin and make it easier to develop an infection.  At the time of your surgery:  · Speak up if someone tries to shave you with a razor before surgery. Ask why you need to be shaved and talk with your surgeon if you have any concerns.  · Ask if you will get antibiotics before surgery.  After your surgery:  · Make sure that your healthcare providers clean their hands before examining you, either with soap and water or an alcohol-based hand rub.  · If you do not see your providers clean their hands, please ask them to do so.  · Family and friends who visit you should not touch the surgical wound or dressings.  · Family and friends should clean their hands with soap and water or an alcohol-based hand rub before and after visiting you. If you do not see them clean their hands, ask them to clean their hands.  What do I need to do when I go home from the hospital?  · Before you go home, your doctor or nurse should explain everything you need to know about taking care of your wound. Make sure you understand how to care for your wound before you leave the hospital.  · Always clean your hands before and after caring for your wound.  · Before you go home, make sure you know who to contact if you have questions or problems after you get home.  · If you have any symptoms of an infection, such as redness and pain at the surgery site, drainage, or fever, call your doctor immediately.  If you have additional questions, please ask your doctor or nurse.  Developed and co-sponsored by The Society for Healthcare Epidemiology of [place] (SHEA); Infectious Diseases Society of [place] (IDSA); American Hospital Association; Association for Professionals in Infection Control and Epidemiology (APIC); Centers for Disease Control and Prevention (CDC); and The Joint Commission.     This information is not intended to replace advice given to you by  your health care provider. Make sure you discuss any questions you have with your health care provider.     Document Released: 12/23/2014 Document Revised: 01/08/2016 Document Reviewed: 03/02/2016  [employer] Interactive Patient Education ©2016 [employer]     PATIENT/FAMILY/RESPONSIBLE PARTY VERBALIZES UNDERSTANDING OF ABOVE EDUCATION.  COPY OF PAIN SCALE GIVEN AND REVIEWED WITH VERBALIZED UNDERSTANDING.

## 2018-10-16 ENCOUNTER — READMISSION MANAGEMENT (OUTPATIENT)
Dept: CALL CENTER | Facility: HOSPITAL | Age: 38
End: 2018-10-16

## 2018-10-16 NOTE — OUTREACH NOTE
Medical Week 1 Survey      Responses   Facility patient discharged from?  [facility]   Does the patient have one of the following disease processes/diagnoses(primary or secondary)?  Other   Is there a successful TCM telephone encounter documented?  No   Week 1 attempt successful?  Yes   Call start time  1615   Rescheduled  Revoked   Call end time  1615          Lois Ryan RN

## 2018-10-17 ENCOUNTER — ANESTHESIA (OUTPATIENT)
Dept: PERIOP | Facility: HOSPITAL | Age: 38
End: 2018-10-17

## 2018-10-17 ENCOUNTER — HOSPITAL ENCOUNTER (OUTPATIENT)
Facility: HOSPITAL | Age: 38
Setting detail: HOSPITAL OUTPATIENT SURGERY
Discharge: HOME | End: 2018-10-17
Attending: UROLOGY | Admitting: UROLOGY

## 2018-10-17 ENCOUNTER — APPOINTMENT (OUTPATIENT)
Dept: GENERAL RADIOLOGY | Facility: HOSPITAL | Age: 38
End: 2018-10-17

## 2018-10-17 ENCOUNTER — ANESTHESIA EVENT (OUTPATIENT)
Dept: PERIOP | Facility: HOSPITAL | Age: 38
End: 2018-10-17

## 2018-10-17 VITALS
OXYGEN SATURATION: 99 % | HEART RATE: 78 BPM | SYSTOLIC BLOOD PRESSURE: 104 MMHG | RESPIRATION RATE: 16 BRPM | TEMPERATURE: 97.9 F | DIASTOLIC BLOOD PRESSURE: 70 MMHG

## 2018-10-17 DIAGNOSIS — N20.1 URETERAL CALCULUS, LEFT: ICD-10-CM

## 2018-10-17 DIAGNOSIS — N13.2 HYDRONEPHROSIS WITH URINARY OBSTRUCTION DUE TO URETERAL CALCULUS: Primary | ICD-10-CM

## 2018-10-17 LAB
B-HCG UR QL: NEGATIVE
BACTERIA SPEC AEROBE CULT: NORMAL

## 2018-10-17 PROCEDURE — 25010000002 ONDANSETRON PER 1 MG: Performed by: NURSE ANESTHETIST, CERTIFIED REGISTERED

## 2018-10-17 PROCEDURE — 74018 RADEX ABDOMEN 1 VIEW: CPT

## 2018-10-17 PROCEDURE — 25010000002 FENTANYL CITRATE (PF) 100 MCG/2ML SOLUTION: Performed by: ANESTHESIOLOGY

## 2018-10-17 PROCEDURE — C1769 GUIDE WIRE: HCPCS | Performed by: UROLOGY

## 2018-10-17 PROCEDURE — 52352 CYSTOURETERO W/STONE REMOVE: CPT | Performed by: UROLOGY

## 2018-10-17 PROCEDURE — 88300 SURGICAL PATH GROSS: CPT | Performed by: UROLOGY

## 2018-10-17 PROCEDURE — 25010000002 NEOSTIGMINE PER 0.5 MG: Performed by: NURSE ANESTHETIST, CERTIFIED REGISTERED

## 2018-10-17 PROCEDURE — 25010000002 MIDAZOLAM PER 1 MG: Performed by: ANESTHESIOLOGY

## 2018-10-17 PROCEDURE — 52356 CYSTO/URETERO W/LITHOTRIPSY: CPT | Performed by: UROLOGY

## 2018-10-17 PROCEDURE — 25010000002 PROPOFOL 10 MG/ML EMULSION: Performed by: NURSE ANESTHETIST, CERTIFIED REGISTERED

## 2018-10-17 PROCEDURE — 25010000002 CEFTRIAXONE 1 G/10ML IV PUSH SYRINGE KIT (PAD): Performed by: UROLOGY

## 2018-10-17 PROCEDURE — C1894 INTRO/SHEATH, NON-LASER: HCPCS | Performed by: UROLOGY

## 2018-10-17 PROCEDURE — 25010000002 IOPAMIDOL 61 % SOLUTION: Performed by: UROLOGY

## 2018-10-17 PROCEDURE — 81025 URINE PREGNANCY TEST: CPT | Performed by: UROLOGY

## 2018-10-17 PROCEDURE — 25010000002 KETOROLAC TROMETHAMINE PER 15 MG: Performed by: NURSE ANESTHETIST, CERTIFIED REGISTERED

## 2018-10-17 PROCEDURE — C2617 STENT, NON-COR, TEM W/O DEL: HCPCS | Performed by: UROLOGY

## 2018-10-17 PROCEDURE — 25010000002 FENTANYL CITRATE (PF) 100 MCG/2ML SOLUTION: Performed by: NURSE ANESTHETIST, CERTIFIED REGISTERED

## 2018-10-17 PROCEDURE — C1758 CATHETER, URETERAL: HCPCS | Performed by: UROLOGY

## 2018-10-17 PROCEDURE — 82360 CALCULUS ASSAY QUANT: CPT | Performed by: UROLOGY

## 2018-10-17 DEVICE — URETERAL STENT
Type: IMPLANTABLE DEVICE | Site: URETER | Status: FUNCTIONAL
Brand: PERCUFLEX™ PLUS

## 2018-10-17 RX ORDER — IPRATROPIUM BROMIDE AND ALBUTEROL SULFATE 2.5; .5 MG/3ML; MG/3ML
3 SOLUTION RESPIRATORY (INHALATION) ONCE
Status: COMPLETED | OUTPATIENT
Start: 2018-10-17 | End: 2018-10-17

## 2018-10-17 RX ORDER — MAGNESIUM HYDROXIDE 1200 MG/15ML
LIQUID ORAL AS NEEDED
Status: DISCONTINUED | OUTPATIENT
Start: 2018-10-17 | End: 2018-10-17 | Stop reason: HOSPADM

## 2018-10-17 RX ORDER — IBUPROFEN 600 MG/1
600 TABLET ORAL ONCE AS NEEDED
Status: DISCONTINUED | OUTPATIENT
Start: 2018-10-17 | End: 2018-10-17 | Stop reason: HOSPADM

## 2018-10-17 RX ORDER — ACETAMINOPHEN 500 MG
1000 TABLET ORAL ONCE
Status: COMPLETED | OUTPATIENT
Start: 2018-10-17 | End: 2018-10-17

## 2018-10-17 RX ORDER — METOCLOPRAMIDE HYDROCHLORIDE 5 MG/ML
5 INJECTION INTRAMUSCULAR; INTRAVENOUS
Status: DISCONTINUED | OUTPATIENT
Start: 2018-10-17 | End: 2018-10-17 | Stop reason: HOSPADM

## 2018-10-17 RX ORDER — SODIUM CHLORIDE, SODIUM LACTATE, POTASSIUM CHLORIDE, CALCIUM CHLORIDE 600; 310; 30; 20 MG/100ML; MG/100ML; MG/100ML; MG/100ML
100 INJECTION, SOLUTION INTRAVENOUS CONTINUOUS
Status: DISCONTINUED | OUTPATIENT
Start: 2018-10-17 | End: 2018-10-17 | Stop reason: HOSPADM

## 2018-10-17 RX ORDER — FENTANYL CITRATE 50 UG/ML
INJECTION, SOLUTION INTRAMUSCULAR; INTRAVENOUS AS NEEDED
Status: DISCONTINUED | OUTPATIENT
Start: 2018-10-17 | End: 2018-10-17 | Stop reason: SURG

## 2018-10-17 RX ORDER — OXYCODONE AND ACETAMINOPHEN 10; 325 MG/1; MG/1
1 TABLET ORAL ONCE AS NEEDED
Status: DISCONTINUED | OUTPATIENT
Start: 2018-10-17 | End: 2018-10-17 | Stop reason: HOSPADM

## 2018-10-17 RX ORDER — HYDROCODONE BITARTRATE AND ACETAMINOPHEN 5; 325 MG/1; MG/1
1 TABLET ORAL EVERY 8 HOURS PRN
Qty: 6 TABLET | Refills: 0 | Status: SHIPPED | OUTPATIENT
Start: 2018-10-17 | End: 2018-10-19

## 2018-10-17 RX ORDER — NALOXONE HCL 0.4 MG/ML
0.4 VIAL (ML) INJECTION AS NEEDED
Status: DISCONTINUED | OUTPATIENT
Start: 2018-10-17 | End: 2018-10-17 | Stop reason: HOSPADM

## 2018-10-17 RX ORDER — ROCURONIUM BROMIDE 10 MG/ML
INJECTION, SOLUTION INTRAVENOUS AS NEEDED
Status: DISCONTINUED | OUTPATIENT
Start: 2018-10-17 | End: 2018-10-17 | Stop reason: SURG

## 2018-10-17 RX ORDER — KETOROLAC TROMETHAMINE 30 MG/ML
INJECTION, SOLUTION INTRAMUSCULAR; INTRAVENOUS AS NEEDED
Status: DISCONTINUED | OUTPATIENT
Start: 2018-10-17 | End: 2018-10-17 | Stop reason: SURG

## 2018-10-17 RX ORDER — ONDANSETRON 2 MG/ML
INJECTION INTRAMUSCULAR; INTRAVENOUS AS NEEDED
Status: DISCONTINUED | OUTPATIENT
Start: 2018-10-17 | End: 2018-10-17 | Stop reason: SURG

## 2018-10-17 RX ORDER — MEPERIDINE HYDROCHLORIDE 25 MG/ML
12.5 INJECTION INTRAMUSCULAR; INTRAVENOUS; SUBCUTANEOUS
Status: DISCONTINUED | OUTPATIENT
Start: 2018-10-17 | End: 2018-10-17 | Stop reason: HOSPADM

## 2018-10-17 RX ORDER — HYDROCODONE BITARTRATE AND ACETAMINOPHEN 5; 325 MG/1; MG/1
1 TABLET ORAL ONCE AS NEEDED
Status: DISCONTINUED | OUTPATIENT
Start: 2018-10-17 | End: 2018-10-17 | Stop reason: HOSPADM

## 2018-10-17 RX ORDER — GLYCOPYRROLATE 0.2 MG/ML
INJECTION INTRAMUSCULAR; INTRAVENOUS AS NEEDED
Status: DISCONTINUED | OUTPATIENT
Start: 2018-10-17 | End: 2018-10-17 | Stop reason: SURG

## 2018-10-17 RX ORDER — SODIUM CHLORIDE, SODIUM LACTATE, POTASSIUM CHLORIDE, CALCIUM CHLORIDE 600; 310; 30; 20 MG/100ML; MG/100ML; MG/100ML; MG/100ML
1000 INJECTION, SOLUTION INTRAVENOUS CONTINUOUS
Status: DISCONTINUED | OUTPATIENT
Start: 2018-10-17 | End: 2018-10-17 | Stop reason: HOSPADM

## 2018-10-17 RX ORDER — MIDAZOLAM HYDROCHLORIDE 1 MG/ML
2 INJECTION INTRAMUSCULAR; INTRAVENOUS
Status: DISCONTINUED | OUTPATIENT
Start: 2018-10-17 | End: 2018-10-17 | Stop reason: HOSPADM

## 2018-10-17 RX ORDER — PROPOFOL 10 MG/ML
VIAL (ML) INTRAVENOUS AS NEEDED
Status: DISCONTINUED | OUTPATIENT
Start: 2018-10-17 | End: 2018-10-17 | Stop reason: SURG

## 2018-10-17 RX ORDER — LIDOCAINE HYDROCHLORIDE 20 MG/ML
INJECTION, SOLUTION INFILTRATION; PERINEURAL AS NEEDED
Status: DISCONTINUED | OUTPATIENT
Start: 2018-10-17 | End: 2018-10-17 | Stop reason: SURG

## 2018-10-17 RX ORDER — FAMOTIDINE 10 MG/ML
20 INJECTION, SOLUTION INTRAVENOUS
Status: DISCONTINUED | OUTPATIENT
Start: 2018-10-17 | End: 2018-10-17 | Stop reason: HOSPADM

## 2018-10-17 RX ORDER — LABETALOL HYDROCHLORIDE 5 MG/ML
5 INJECTION, SOLUTION INTRAVENOUS
Status: DISCONTINUED | OUTPATIENT
Start: 2018-10-17 | End: 2018-10-17 | Stop reason: HOSPADM

## 2018-10-17 RX ORDER — IPRATROPIUM BROMIDE AND ALBUTEROL SULFATE 2.5; .5 MG/3ML; MG/3ML
3 SOLUTION RESPIRATORY (INHALATION) ONCE AS NEEDED
Status: DISCONTINUED | OUTPATIENT
Start: 2018-10-17 | End: 2018-10-17 | Stop reason: HOSPADM

## 2018-10-17 RX ORDER — MIDAZOLAM HYDROCHLORIDE 1 MG/ML
1 INJECTION INTRAMUSCULAR; INTRAVENOUS
Status: DISCONTINUED | OUTPATIENT
Start: 2018-10-17 | End: 2018-10-17 | Stop reason: HOSPADM

## 2018-10-17 RX ORDER — OXYCODONE AND ACETAMINOPHEN 7.5; 325 MG/1; MG/1
2 TABLET ORAL ONCE AS NEEDED
Status: COMPLETED | OUTPATIENT
Start: 2018-10-17 | End: 2018-10-17

## 2018-10-17 RX ORDER — SODIUM CHLORIDE 0.9 % (FLUSH) 0.9 %
3 SYRINGE (ML) INJECTION AS NEEDED
Status: DISCONTINUED | OUTPATIENT
Start: 2018-10-17 | End: 2018-10-17 | Stop reason: HOSPADM

## 2018-10-17 RX ORDER — ONDANSETRON 2 MG/ML
4 INJECTION INTRAMUSCULAR; INTRAVENOUS ONCE AS NEEDED
Status: DISCONTINUED | OUTPATIENT
Start: 2018-10-17 | End: 2018-10-17 | Stop reason: HOSPADM

## 2018-10-17 RX ORDER — SODIUM CHLORIDE 0.9 % (FLUSH) 0.9 %
3 SYRINGE (ML) INJECTION EVERY 12 HOURS SCHEDULED
Status: DISCONTINUED | OUTPATIENT
Start: 2018-10-17 | End: 2018-10-17 | Stop reason: HOSPADM

## 2018-10-17 RX ORDER — LIDOCAINE HYDROCHLORIDE 40 MG/ML
SOLUTION TOPICAL AS NEEDED
Status: DISCONTINUED | OUTPATIENT
Start: 2018-10-17 | End: 2018-10-17 | Stop reason: SURG

## 2018-10-17 RX ORDER — SODIUM CHLORIDE 0.9 % (FLUSH) 0.9 %
1-10 SYRINGE (ML) INJECTION AS NEEDED
Status: DISCONTINUED | OUTPATIENT
Start: 2018-10-17 | End: 2018-10-17 | Stop reason: HOSPADM

## 2018-10-17 RX ORDER — FENTANYL CITRATE 50 UG/ML
25 INJECTION, SOLUTION INTRAMUSCULAR; INTRAVENOUS AS NEEDED
Status: COMPLETED | OUTPATIENT
Start: 2018-10-17 | End: 2018-10-17

## 2018-10-17 RX ADMIN — HYDROCODONE BITARTRATE AND ACETAMINOPHEN 1 TABLET: 5; 325 TABLET ORAL at 18:32

## 2018-10-17 RX ADMIN — MIDAZOLAM HYDROCHLORIDE 2 MG: 1 INJECTION, SOLUTION INTRAMUSCULAR; INTRAVENOUS at 15:12

## 2018-10-17 RX ADMIN — LIDOCAINE HYDROCHLORIDE 1 EACH: 40 SOLUTION TOPICAL at 16:22

## 2018-10-17 RX ADMIN — LIDOCAINE HYDROCHLORIDE 50 MG: 20 INJECTION, SOLUTION INFILTRATION; PERINEURAL at 16:21

## 2018-10-17 RX ADMIN — ROCURONIUM BROMIDE 40 MG: 10 INJECTION INTRAVENOUS at 16:21

## 2018-10-17 RX ADMIN — PROPOFOL 200 MG: 10 INJECTION, EMULSION INTRAVENOUS at 16:21

## 2018-10-17 RX ADMIN — SODIUM CHLORIDE, POTASSIUM CHLORIDE, SODIUM LACTATE AND CALCIUM CHLORIDE: 600; 310; 30; 20 INJECTION, SOLUTION INTRAVENOUS at 17:00

## 2018-10-17 RX ADMIN — FENTANYL CITRATE 100 MCG: 50 INJECTION, SOLUTION INTRAMUSCULAR; INTRAVENOUS at 16:18

## 2018-10-17 RX ADMIN — FAMOTIDINE 20 MG: 10 INJECTION, SOLUTION INTRAVENOUS at 15:12

## 2018-10-17 RX ADMIN — OXYCODONE HYDROCHLORIDE AND ACETAMINOPHEN 2 TABLET: 7.5; 325 TABLET ORAL at 17:21

## 2018-10-17 RX ADMIN — GLYCOPYRROLATE 0.3 MG: 0.2 INJECTION, SOLUTION INTRAMUSCULAR; INTRAVENOUS at 16:56

## 2018-10-17 RX ADMIN — ONDANSETRON HYDROCHLORIDE 4 MG: 2 SOLUTION INTRAMUSCULAR; INTRAVENOUS at 16:57

## 2018-10-17 RX ADMIN — SODIUM CHLORIDE, POTASSIUM CHLORIDE, SODIUM LACTATE AND CALCIUM CHLORIDE 1000 ML: 600; 310; 30; 20 INJECTION, SOLUTION INTRAVENOUS at 13:16

## 2018-10-17 RX ADMIN — ACETAMINOPHEN 1000 MG: 500 TABLET, FILM COATED ORAL at 15:11

## 2018-10-17 RX ADMIN — LIDOCAINE HYDROCHLORIDE 0.5 ML: 10 INJECTION, SOLUTION EPIDURAL; INFILTRATION; INTRACAUDAL; PERINEURAL at 13:16

## 2018-10-17 RX ADMIN — Medication 1 G: at 16:26

## 2018-10-17 RX ADMIN — FENTANYL CITRATE 25 MCG: 50 INJECTION, SOLUTION INTRAMUSCULAR; INTRAVENOUS at 17:40

## 2018-10-17 RX ADMIN — KETOROLAC TROMETHAMINE 30 MG: 30 INJECTION, SOLUTION INTRAMUSCULAR at 16:57

## 2018-10-17 RX ADMIN — FENTANYL CITRATE 25 MCG: 50 INJECTION, SOLUTION INTRAMUSCULAR; INTRAVENOUS at 17:22

## 2018-10-17 RX ADMIN — FENTANYL CITRATE 25 MCG: 50 INJECTION, SOLUTION INTRAMUSCULAR; INTRAVENOUS at 17:30

## 2018-10-17 RX ADMIN — IPRATROPIUM BROMIDE AND ALBUTEROL SULFATE 3 ML: 2.5; .5 SOLUTION RESPIRATORY (INHALATION) at 15:11

## 2018-10-17 RX ADMIN — FENTANYL CITRATE 25 MCG: 50 INJECTION, SOLUTION INTRAMUSCULAR; INTRAVENOUS at 17:35

## 2018-10-17 RX ADMIN — Medication 2 MG: at 16:56

## 2018-10-17 NOTE — H&P (VIEW-ONLY)
Urology H&P    Ms. De La Cruz is 37 y.o. female    CHIEF COMPLAINT: Left flank pain    HPI  Urolithiasis  Patient complains of left flank pain. Seen in [facility] ER and transferred here for definitive management.  This started 2 week(s) ago. The pain is described as colicky with radiation to the lower abdomen. Associated manifestations include nausea, diaphoresis and fever. Severity, when pain is present,  is rated at 9/10 moderate relief has been experienced from parenenteral analgesics. The patient has a remote history of stones. T.     The following portions of the patient's history were reviewed and updated as appropriate: allergies, current medications, past family history, past medical history, past social history, past surgical history and problem list.    Review of Systems   Constitutional: Positive for appetite change, diaphoresis and fever.   HENT: Negative for facial swelling and sore throat.    Eyes: Negative for discharge and visual disturbance.   Respiratory: Negative for cough and shortness of breath.    Cardiovascular: Negative for chest pain and leg swelling.   Gastrointestinal: Positive for nausea. Negative for anal bleeding and vomiting.   Endocrine: Negative for cold intolerance and heat intolerance.   Genitourinary: Positive for flank pain. Negative for hematuria and pelvic pain.   Musculoskeletal: Negative for back pain and gait problem.   Skin: Negative for pallor and rash.   Allergic/Immunologic: Negative for food allergies.   Neurological: Negative for seizures and headaches.   Hematological: Negative for adenopathy. Does not bruise/bleed easily.   Psychiatric/Behavioral: Negative for dysphoric mood, self-injury and suicidal ideas.       Prescriptions Prior to Admission   Medication Sig Dispense Refill Last Dose   • FLUoxetine (PROzac) 40 MG capsule Take 40 capsules by mouth Daily.      • gabapentin (NEURONTIN) 600 MG tablet Take 600 tablets by mouth 2 (Two) Times a Day.      • lamoTRIgine  "(LaMICtal) 100 MG tablet Take 100 tablets by mouth 2 (Two) Times a Day.      • levonorgestrel-ethinyl estradiol (LUTERA) 0.1-20 MG-MCG per tablet Take 20 capsules by mouth Daily.            Current Facility-Administered Medications:   •  cefTRIAXone (ROCEPHIN) 1 g/10mL IV PUSH syringe, 1 g, Intravenous, Q24H, Tyrone Machuca MD  •  FLUoxetine (PROzac) capsule 40 mg, 40 mg, Oral, Q12H, Tyrone Machuca MD  •  gabapentin (NEURONTIN) capsule 600 mg, 600 mg, Oral, Nightly, Tyrone Machuca MD  •  HYDROmorphone (DILAUDID) injection 0.5 mg, 0.5 mg, Intravenous, Q3H PRN, Tyrone Machuca MD, 0.5 mg at 10/06/18 0526  •  HYDROmorphone (DILAUDID) injection solution 1 mg, 1 mg, Intravenous, Q2H PRN, Tyrone Machuca MD, 1 mg at 10/05/18 2257  •  ketorolac (TORADOL) injection 30 mg, 30 mg, Intravenous, Q6H PRN, Tyrone Machuca MD, 30 mg at 10/06/18 0352  •  lamoTRIgine (LaMICtal) tablet 100 mg, 100 mg, Oral, BID, Tyrone Machuca MD  •  ondansetron (ZOFRAN) injection 4 mg, 4 mg, Intravenous, Q4H PRN, Tyrone Machuca MD  •  oxyCODONE-acetaminophen (PERCOCET)  MG per tablet 1 tablet, 1 tablet, Oral, Q4H PRN, Tyrone Machuca MD  •  oxyCODONE-acetaminophen (PERCOCET) 5-325 MG per tablet 1 tablet, 1 tablet, Oral, Q4H PRN, Tyrone Machuca MD  •  sodium chloride 0.9 % infusion, 150 mL/hr, Intravenous, Continuous, Tyrone Machuca MD, Stopped at 10/06/18 0527  •  zolpidem (AMBIEN) tablet 5 mg, 5 mg, Oral, Nightly PRN, Tyrone Machuca MD    History reviewed. No pertinent past medical history.    History reviewed. No pertinent surgical history.    Social History     Social History   • Marital status:      Social History Main Topics   • Drug use: Unknown     Other Topics Concern   • Not on file       History reviewed. No pertinent family history.    /73 (BP Location: Left arm, Patient Position: Lying)   Pulse 58   Temp 97.6 °F (36.4 °C) (Oral)   Resp 16   Ht 167.6 cm (66\")   Wt 73.7 kg (162 lb " 6.4 oz)   SpO2 98%   BMI 26.21 kg/m²     Physical Exam  Constitutional: Well nourished, Well developed; No apparent distress; Vital reviewed as above  Psychiatric: Appropriate affect; Alert and oriented  Eyes: Unremarkable  Musculoskeletal: Normal gait and station  GI: Abdomen is soft, with mild left upper quadrant tenderness. CVA tenderness noted on percussion of flank.   Respiratory: No distress; Unlabored movement; No accessory musculature needed with symmetric movements  Skin: No pallor or diaphoresis  Lymphatic: No adenopathy neck or groin    Lab Results   Component Value Date    GLUCOSE 77 10/06/2018    BUN 13 10/06/2018    CREATININE 1.16 10/06/2018    EGFRIFNONA 53 (L) 10/06/2018    BCR 11.2 10/06/2018    CO2 24.0 10/06/2018    CALCIUM 7.9 (L) 10/06/2018    ALBUMIN 2.80 (L) 10/06/2018    AST 26 10/06/2018    ALT 26 10/06/2018     Lab Results   Component Value Date    GLUCOSE 77 10/06/2018    CALCIUM 7.9 (L) 10/06/2018     10/06/2018    K 3.5 10/06/2018    CO2 24.0 10/06/2018     10/06/2018    BUN 13 10/06/2018    CREATININE 1.16 10/06/2018    EGFRIFNONA 53 (L) 10/06/2018    BCR 11.2 10/06/2018    ANIONGAP 9.0 10/06/2018     Lab Results   Component Value Date    WBC 4.16 (L) 10/06/2018    HGB 11.8 (L) 10/06/2018    HCT 35.3 (L) 10/06/2018    MCV 89.4 10/06/2018     10/06/2018     Independent review of a CT scan of abdomen and pelvis without contrast  The CT scan of the abdomen/pelvis done without contrast is available for me to review.  Treatment recommendations require an independent review.  First I scanned the liver, spleen, and bowel pattern.  The retroperitoneum including the major vessels and lymphatic packages are briefly reviewed.  This film as been reviewed by the radiologist to determine any non urologic abnormalities that are present.  The kidneys are closely inspected for size, symmetry, contour, parenchymal thickness, perinephric reaction, presence of calcifications, and  intrarenal dilation of the collecting system.  The ureters are inspected for their course, caliber, and any calcifications.  The bladder is inspected for its thickness, size, and presence of any calcifications.  This scan shows a 9 mm stone in left proximal ureter with obstruction. Moderate trung-renal edema without urinoma.        Assessment and Plan  #1. Left proximal ureteral obstruction with hydronephrosis secondary to proximal ureteral calculus.   #2. Intractable pain from colic  #3. Fever, probably acute pyelonephritis  - Will need parenteral analgesics for pain controlled included opiates.  - IV antibiotics started with ceftriaxone.  - Will need further evaluation with retrograde ureteropyelogram  - Needs cystoscopy and stent placement to decompress upper tracts.   - Definitive stone management and requirement for removal of stent discussed with patient and her significant other.   (Please note that portions of this note were completed with a voice recognition program.)  Tyrone Machuca MD  10/06/18  7:48 AM

## 2018-10-17 NOTE — OP NOTE
Operative Summary    Arin De La Cruz  Date of Procedure: 10/17/2018    Pre-op Diagnosis:   Ureteral calculus, left [N20.1]    Post-op Diagnosis:     Post-Op Diagnosis Codes:     * Ureteral calculus, left [N20.1]    Procedure/CPT® Codes:      Procedure(s):  URETEROSCOPY LASER LITHOTRIPSY WITH STENT EXCHANGE STONE EXTRACTION    Surgeon(s):  Tyrone Machuca MD    Anesthesia: General    Staff:   Circulator: Scarlet Oliva RN  Scrub Person: Carrillo Pinto; David Whalen    Indications for procedure:  Patient presented with a stone and complex urinary tract infection due to obstruction from the stone.  She had stent placement and antibiotics and presents today for definitive stone management.    Findings:   #1. Cystoscopy findings: The anterior urethra shows no evidence of stricture.  The bladder itself shows no mucosal lesions marital relations or masses.  The orifices are in appropriate position.  Stent is seen exiting the left ureteral orifice.  #2. Ureteroscopic findings: The ureter from the ureteropelvic junction to the ureterovesical junction shows no evidence of stones.  There are some edema from the stent but no significant trauma following flexible ureteral pyeloscopy.  The pelvis and calyceal system were scope.  On introduction of the wire the stone was bumped into the lower pole of the kidney where it was treated.         Procedure details:  The patient is identified in the preoperative holding area. Informed consent process had been obtained In the office  and the patient has a good recollection of that discussion. No additional questions were voiced.     The patient is taken to the operating room suite and placed on the cystoscopy table. Effective general anesthesia is given and the patient is placed in the dorsal lithotomy position. Lm stirrups are used to support the legs with attention being paid to their postioning to avoid compression on the peroneal nerve or other pressure points.. The  usual prep and drape is carried out with Betadine. At this point the appropriate timeout protocol was observed.     A 22 Chinese cystoscope sheath with a 30° lens is inserted.  Please see the cystoscopic findings as described above.    The left ureteral orifice is identified.  The stent is seen exiting the ureteral orifice and pulled out the external urethral meatus.  A 0.038 mm guidewires passed through this and confirmed to be in the bladder.  At this point a dual-lumen catheter is used to pass a second wire, a 0.038 mm Amplatz Super Stiff wire,  which served as the working wire.  I then passed a 12 Chinese/14 Chinese x 28 cm ureteral access sheath is passed over the working wire under fluoroscopic vision below the stone.  I then took the EditGrid digital   flexible ureteroscope negotiated this through the access sheath and ureter to where the stone(s) was located in the lower pole of the kidney because it had been manipulated into the kidney at time of wire placement.  A 200 µ holmium laser fiber is used with initial settings of 0.8 J and a repetition rate of 8 that were titrated upward for efficacy.  After the stone was felt to be fragmented adequately I took a 0 tip basket and remove fragments that would be concerning for ureteral obstruction as well as stone analysis.  The ureteroscope was then reintroduced all the way to the ureteropelvic junction and no other obstructing stones are identified.  Ureteroscope was then removed.     The wire is then back loaded through the cystoscope and the orifice visualized with the wire appropriately positioned. I passed a ureteral stent over the guidewire into the right renal pelvis and pulled the wire back seeing a good curl in the renal pelvis on fluoroscopy. The wire is removed in its entirety after the string is removed and I could see a good curl of the stent in the bladder. The bladder is then drained. The patient tolerated the procedure without difficulty.      Patient is  now transferred to recovery room in stable condition.    Estimated Blood Loss: <30 mL    Specimens:                ID Type Source Tests Collected by Time   A :  Calculus Ureter, Left TISSUE PATHOLOGY EXAM Tyrone Machuca MD 10/17/2018 6853         Drains:   Ureteral Drain/Stent Left ureter  (Active)   Site Assessment Clean;Intact 10/17/2018  5:20 PM   Dressing Status Clean;Dry;Intact 10/17/2018  5:20 PM       Complications: none    Plan: Patient will have her stent removed at a nurse visit in 2 days.  I will see her back in a month with her stone analysis and a renal ultrasound prior to that visit.    Tyrone Machuca MD     Date: 10/17/2018  Time: 5:36 PM

## 2018-10-17 NOTE — ANESTHESIA POSTPROCEDURE EVALUATION
Patient: Arin De La Cruz    Procedure Summary     Date:  10/17/18 Room / Location:   [facility] OR  /  [facility] OR    Anesthesia Start:  1617 Anesthesia Stop:  1708    Procedure:  URETEROSCOPY LASER LITHOTRIPSY WITH STENT EXCHANGE STONE EXTRACTION (Left Ureter) Diagnosis:       Ureteral calculus, left      (Ureteral calculus, left [N20.1])    Surgeon:  Tyrone Machuca MD Provider:  Shabbir lAba CRNA    Anesthesia Type:  general ASA Status:  2          Anesthesia Type: general  Last vitals  BP   121/67 (10/17/18 1750)   Temp   98 °F (36.7 °C) (10/17/18 1707)   Pulse   67 (10/17/18 1750)   Resp   12 (10/17/18 1750)     SpO2   100 % (10/17/18 1750)     Post Anesthesia Care and Evaluation    Patient location during evaluation: PACU  Patient participation: complete - patient participated  Level of consciousness: awake and alert  Pain score: 0  Pain management: adequate  Airway patency: patent  Anesthetic complications: No anesthetic complications    Cardiovascular status: acceptable and stable  Respiratory status: acceptable and unassisted  Hydration status: acceptable

## 2018-10-17 NOTE — ANESTHESIA PROCEDURE NOTES
Airway  Urgency: elective    Airway not difficult    General Information and Staff    Patient location during procedure: OR  CRNA: RODERICK PEPE    Indications and Patient Condition  Indications for airway management: airway protection    Preoxygenated: yes  Mask difficulty assessment: 1 - vent by mask    Final Airway Details  Final airway type: endotracheal airway      Successful airway: ETT  Cuffed: yes   Successful intubation technique: direct laryngoscopy  Endotracheal tube insertion site: oral  Blade: Schultz  Blade size: 2  ETT size: 7.0 mm  Cormack-Lehane Classification: grade I - full view of glottis  Placement verified by: capnometry   Measured from: lips  ETT to lips (cm): 21  Number of attempts at approach: 1    Additional Comments  Easy intubation, dentition remains as preop..(several missing, decayed, broken)

## 2018-10-17 NOTE — DISCHARGE INSTRUCTIONS

## 2018-10-17 NOTE — ANESTHESIA PREPROCEDURE EVALUATION
Anesthesia Evaluation     Patient summary reviewed and Nursing notes reviewed   no history of anesthetic complications:  NPO Solid Status: > 8 hours  NPO Liquid Status: > 8 hours           Airway   Mallampati: I  TM distance: >3 FB  Neck ROM: full  No difficulty expected  Dental    (+) poor dentition    Pulmonary    (+) a smoker Current,   Cardiovascular   Exercise tolerance: good (4-7 METS)    (-) hypertension, CAD      Neuro/Psych  (+) psychiatric history Anxiety, Depression and Bipolar,     (-) seizures, TIA, CVA  GI/Hepatic/Renal/Endo    (+)   hepatitis C, liver disease, renal disease stones,   (-) diabetes    Musculoskeletal     Abdominal    Substance History      OB/GYN          Other                        Anesthesia Plan    ASA 2     general     intravenous induction   Anesthetic plan, all risks, benefits, and alternatives have been provided, discussed and informed consent has been obtained with: patient.

## 2018-10-17 NOTE — INTERVAL H&P NOTE
H&P updated. The patient was examined and the following changes are noted:  did well with stent placment and antibiotic therapy. Needs definitve management of her stone with urterosoopy.

## 2018-11-07 LAB
LAB AP CASE REPORT: NORMAL
PATH REPORT.FINAL DX SPEC: NORMAL
PATH REPORT.GROSS SPEC: NORMAL

## 2019-04-08 ENCOUNTER — HOSPITAL ENCOUNTER (INPATIENT)
Age: 39
LOS: 3 days | Discharge: HOME | DRG: 885 | End: 2019-04-11
Attending: EMERGENCY MEDICINE | Admitting: PSYCHIATRY & NEUROLOGY
Payer: COMMERCIAL

## 2019-04-08 DIAGNOSIS — R45.851 SUICIDAL THOUGHTS: Primary | ICD-10-CM

## 2019-04-08 DIAGNOSIS — T54.92XA INGESTION OF BLEACH, INTENTIONAL SELF-HARM, INITIAL ENCOUNTER (HCC): ICD-10-CM

## 2019-04-08 DIAGNOSIS — S61.512A LACERATION OF LEFT WRIST, INITIAL ENCOUNTER: ICD-10-CM

## 2019-04-08 PROBLEM — F31.32 BIPOLAR AFFECTIVE DISORDER, DEPRESSED, MODERATE (HCC): Status: ACTIVE | Noted: 2019-04-08

## 2019-04-08 LAB
ACETAMINOPHEN LEVEL: <15 UG/ML
ALBUMIN SERPL-MCNC: 4.3 G/DL (ref 3.5–5.2)
ALP BLD-CCNC: 100 U/L (ref 35–104)
ALT SERPL-CCNC: 132 U/L (ref 5–33)
AMPHETAMINE SCREEN, URINE: POSITIVE
ANION GAP SERPL CALCULATED.3IONS-SCNC: 10 MMOL/L (ref 7–19)
AST SERPL-CCNC: 133 U/L (ref 5–32)
BACTERIA: NEGATIVE /HPF
BARBITURATE SCREEN URINE: NEGATIVE
BASOPHILS ABSOLUTE: 0 K/UL (ref 0–0.2)
BASOPHILS RELATIVE PERCENT: 0.4 % (ref 0–1)
BENZODIAZEPINE SCREEN, URINE: POSITIVE
BILIRUB SERPL-MCNC: 0.5 MG/DL (ref 0.2–1.2)
BILIRUBIN URINE: NEGATIVE
BLOOD, URINE: NEGATIVE
BUN BLDV-MCNC: 8 MG/DL (ref 6–20)
CALCIUM SERPL-MCNC: 9.5 MG/DL (ref 8.6–10)
CANNABINOID SCREEN URINE: NEGATIVE
CHLORIDE BLD-SCNC: 105 MMOL/L (ref 98–111)
CLARITY: CLEAR
CO2: 23 MMOL/L (ref 22–29)
COCAINE METABOLITE SCREEN URINE: NEGATIVE
COLOR: YELLOW
CREAT SERPL-MCNC: 0.7 MG/DL (ref 0.5–0.9)
EOSINOPHILS ABSOLUTE: 0.1 K/UL (ref 0–0.6)
EOSINOPHILS RELATIVE PERCENT: 0.7 % (ref 0–5)
EPITHELIAL CELLS, UA: 7 /HPF (ref 0–5)
ETHANOL: <10 MG/DL (ref 0–0.08)
GFR NON-AFRICAN AMERICAN: >60
GLUCOSE BLD-MCNC: 96 MG/DL (ref 74–109)
GLUCOSE URINE: NEGATIVE MG/DL
HCG(URINE) PREGNANCY TEST: NEGATIVE
HCT VFR BLD CALC: 43.5 % (ref 37–47)
HEMOGLOBIN: 14.1 G/DL (ref 12–16)
HYALINE CASTS: 13 /HPF (ref 0–8)
KETONES, URINE: 40 MG/DL
LEUKOCYTE ESTERASE, URINE: ABNORMAL
LYMPHOCYTES ABSOLUTE: 2.3 K/UL (ref 1.1–4.5)
LYMPHOCYTES RELATIVE PERCENT: 25.6 % (ref 20–40)
Lab: ABNORMAL
MCH RBC QN AUTO: 29.8 PG (ref 27–31)
MCHC RBC AUTO-ENTMCNC: 32.4 G/DL (ref 33–37)
MCV RBC AUTO: 92 FL (ref 81–99)
MONOCYTES ABSOLUTE: 0.7 K/UL (ref 0–0.9)
MONOCYTES RELATIVE PERCENT: 7.4 % (ref 0–10)
NEUTROPHILS ABSOLUTE: 5.8 K/UL (ref 1.5–7.5)
NEUTROPHILS RELATIVE PERCENT: 65.5 % (ref 50–65)
NITRITE, URINE: NEGATIVE
OPIATE SCREEN URINE: NEGATIVE
PDW BLD-RTO: 13.2 % (ref 11.5–14.5)
PH UA: 7 (ref 5–8)
PLATELET # BLD: 185 K/UL (ref 130–400)
PMV BLD AUTO: 9.2 FL (ref 9.4–12.3)
POTASSIUM SERPL-SCNC: 3.6 MMOL/L (ref 3.5–5)
PROTEIN UA: 30 MG/DL
RBC # BLD: 4.73 M/UL (ref 4.2–5.4)
RBC UA: 4 /HPF (ref 0–4)
SALICYLATE, SERUM: <3 MG/DL (ref 3–10)
SODIUM BLD-SCNC: 138 MMOL/L (ref 136–145)
SPECIFIC GRAVITY UA: 1.02 (ref 1–1.03)
TOTAL PROTEIN: 7.9 G/DL (ref 6.6–8.7)
URINE REFLEX TO CULTURE: YES
UROBILINOGEN, URINE: 1 E.U./DL
WBC # BLD: 8.9 K/UL (ref 4.8–10.8)
WBC UA: 17 /HPF (ref 0–5)

## 2019-04-08 PROCEDURE — 99285 EMERGENCY DEPT VISIT HI MDM: CPT | Performed by: EMERGENCY MEDICINE

## 2019-04-08 PROCEDURE — 81001 URINALYSIS AUTO W/SCOPE: CPT

## 2019-04-08 PROCEDURE — 84703 CHORIONIC GONADOTROPIN ASSAY: CPT

## 2019-04-08 PROCEDURE — 6370000000 HC RX 637 (ALT 250 FOR IP): Performed by: EMERGENCY MEDICINE

## 2019-04-08 PROCEDURE — 99285 EMERGENCY DEPT VISIT HI MDM: CPT

## 2019-04-08 PROCEDURE — G0480 DRUG TEST DEF 1-7 CLASSES: HCPCS

## 2019-04-08 PROCEDURE — 90715 TDAP VACCINE 7 YRS/> IM: CPT | Performed by: EMERGENCY MEDICINE

## 2019-04-08 PROCEDURE — 85025 COMPLETE CBC W/AUTO DIFF WBC: CPT

## 2019-04-08 PROCEDURE — 6370000000 HC RX 637 (ALT 250 FOR IP): Performed by: NURSE PRACTITIONER

## 2019-04-08 PROCEDURE — 90792 PSYCH DIAG EVAL W/MED SRVCS: CPT | Performed by: NURSE PRACTITIONER

## 2019-04-08 PROCEDURE — 1240000000 HC EMOTIONAL WELLNESS R&B

## 2019-04-08 PROCEDURE — 96374 THER/PROPH/DIAG INJ IV PUSH: CPT

## 2019-04-08 PROCEDURE — 36415 COLL VENOUS BLD VENIPUNCTURE: CPT

## 2019-04-08 PROCEDURE — 80307 DRUG TEST PRSMV CHEM ANLYZR: CPT

## 2019-04-08 PROCEDURE — 12001 RPR S/N/AX/GEN/TRNK 2.5CM/<: CPT | Performed by: EMERGENCY MEDICINE

## 2019-04-08 PROCEDURE — 80053 COMPREHEN METABOLIC PANEL: CPT

## 2019-04-08 PROCEDURE — 90471 IMMUNIZATION ADMIN: CPT | Performed by: EMERGENCY MEDICINE

## 2019-04-08 PROCEDURE — 87086 URINE CULTURE/COLONY COUNT: CPT

## 2019-04-08 PROCEDURE — HZ2ZZZZ DETOXIFICATION SERVICES FOR SUBSTANCE ABUSE TREATMENT: ICD-10-PCS | Performed by: PSYCHIATRY & NEUROLOGY

## 2019-04-08 PROCEDURE — 6360000002 HC RX W HCPCS: Performed by: EMERGENCY MEDICINE

## 2019-04-08 RX ORDER — LORAZEPAM 2 MG/ML
INJECTION INTRAMUSCULAR
Status: DISPENSED
Start: 2019-04-08 | End: 2019-04-08

## 2019-04-08 RX ORDER — DICYCLOMINE HYDROCHLORIDE 10 MG/1
10 CAPSULE ORAL
Status: DISCONTINUED | OUTPATIENT
Start: 2019-04-08 | End: 2019-04-11 | Stop reason: HOSPADM

## 2019-04-08 RX ORDER — CLONIDINE HYDROCHLORIDE 0.1 MG/1
0.1 TABLET ORAL EVERY 4 HOURS PRN
Status: DISCONTINUED | OUTPATIENT
Start: 2019-04-08 | End: 2019-04-11 | Stop reason: HOSPADM

## 2019-04-08 RX ORDER — CLONAZEPAM 0.5 MG/1
0.5 TABLET ORAL 3 TIMES DAILY PRN
COMMUNITY

## 2019-04-08 RX ORDER — IBUPROFEN 400 MG/1
400 TABLET ORAL EVERY 6 HOURS PRN
Status: DISCONTINUED | OUTPATIENT
Start: 2019-04-08 | End: 2019-04-11 | Stop reason: HOSPADM

## 2019-04-08 RX ORDER — LISINOPRIL 10 MG/1
10 TABLET ORAL DAILY
COMMUNITY

## 2019-04-08 RX ORDER — LOPERAMIDE HYDROCHLORIDE 2 MG/1
2 CAPSULE ORAL 4 TIMES DAILY PRN
Status: DISCONTINUED | OUTPATIENT
Start: 2019-04-08 | End: 2019-04-11 | Stop reason: HOSPADM

## 2019-04-08 RX ORDER — ACETAMINOPHEN 500 MG
1000 TABLET ORAL ONCE
Status: COMPLETED | OUTPATIENT
Start: 2019-04-08 | End: 2019-04-08

## 2019-04-08 RX ORDER — CLONAZEPAM 0.5 MG/1
0.5 TABLET ORAL 2 TIMES DAILY PRN
Status: DISCONTINUED | OUTPATIENT
Start: 2019-04-08 | End: 2019-04-11 | Stop reason: HOSPADM

## 2019-04-08 RX ORDER — ACETAMINOPHEN 325 MG/1
650 TABLET ORAL EVERY 4 HOURS PRN
Status: DISCONTINUED | OUTPATIENT
Start: 2019-04-08 | End: 2019-04-11 | Stop reason: HOSPADM

## 2019-04-08 RX ORDER — FLUOXETINE HYDROCHLORIDE 20 MG/1
40 CAPSULE ORAL DAILY
Status: DISCONTINUED | OUTPATIENT
Start: 2019-04-08 | End: 2019-04-11 | Stop reason: HOSPADM

## 2019-04-08 RX ORDER — BUPRENORPHINE AND NALOXONE 8; 2 MG/1; MG/1
2 FILM, SOLUBLE BUCCAL; SUBLINGUAL DAILY
Status: DISCONTINUED | OUTPATIENT
Start: 2019-04-08 | End: 2019-04-08

## 2019-04-08 RX ORDER — ARIPIPRAZOLE 10 MG/1
10 TABLET ORAL DAILY
Status: ON HOLD | COMMUNITY
End: 2019-04-11 | Stop reason: HOSPADM

## 2019-04-08 RX ORDER — ONDANSETRON 4 MG/1
4 TABLET, FILM COATED ORAL EVERY 8 HOURS PRN
Status: DISCONTINUED | OUTPATIENT
Start: 2019-04-08 | End: 2019-04-11 | Stop reason: HOSPADM

## 2019-04-08 RX ORDER — GABAPENTIN 600 MG/1
300 TABLET ORAL 3 TIMES DAILY
Status: DISCONTINUED | OUTPATIENT
Start: 2019-04-08 | End: 2019-04-09

## 2019-04-08 RX ORDER — LORAZEPAM 2 MG/ML
1 INJECTION INTRAMUSCULAR ONCE
Status: COMPLETED | OUTPATIENT
Start: 2019-04-08 | End: 2019-04-08

## 2019-04-08 RX ORDER — LAMOTRIGINE 150 MG/1
150 TABLET ORAL 2 TIMES DAILY
Status: DISCONTINUED | OUTPATIENT
Start: 2019-04-08 | End: 2019-04-11 | Stop reason: HOSPADM

## 2019-04-08 RX ADMIN — FLUOXETINE 40 MG: 20 CAPSULE ORAL at 16:10

## 2019-04-08 RX ADMIN — LORAZEPAM 1 MG: 2 INJECTION INTRAMUSCULAR; INTRAVENOUS at 03:51

## 2019-04-08 RX ADMIN — CLONAZEPAM 0.5 MG: 0.5 TABLET ORAL at 17:14

## 2019-04-08 RX ADMIN — GABAPENTIN 300 MG: 600 TABLET, FILM COATED ORAL at 20:07

## 2019-04-08 RX ADMIN — ACETAMINOPHEN 1000 MG: 500 TABLET, FILM COATED ORAL at 06:39

## 2019-04-08 RX ADMIN — LAMOTRIGINE 150 MG: 150 TABLET ORAL at 20:06

## 2019-04-08 RX ADMIN — GABAPENTIN 300 MG: 600 TABLET, FILM COATED ORAL at 16:10

## 2019-04-08 RX ADMIN — DICYCLOMINE HYDROCHLORIDE 10 MG: 10 CAPSULE ORAL at 16:53

## 2019-04-08 RX ADMIN — ACETAMINOPHEN 650 MG: 325 TABLET ORAL at 14:57

## 2019-04-08 RX ADMIN — TETANUS TOXOID, REDUCED DIPHTHERIA TOXOID AND ACELLULAR PERTUSSIS VACCINE, ADSORBED 0.5 ML: 5; 2.5; 8; 8; 2.5 SUSPENSION INTRAMUSCULAR at 06:41

## 2019-04-08 ASSESSMENT — LIFESTYLE VARIABLES: HISTORY_ALCOHOL_USE: NO

## 2019-04-08 ASSESSMENT — ENCOUNTER SYMPTOMS
TROUBLE SWALLOWING: 0
RHINORRHEA: 0
SORE THROAT: 0
NAUSEA: 0
VOMITING: 0
COUGH: 0
ABDOMINAL DISTENTION: 0
EYE PAIN: 0
ABDOMINAL PAIN: 0
WHEEZING: 0
SHORTNESS OF BREATH: 0
BACK PAIN: 0
CONSTIPATION: 0
CHEST TIGHTNESS: 0
DIARRHEA: 0
COLOR CHANGE: 0
PHOTOPHOBIA: 0

## 2019-04-08 ASSESSMENT — SLEEP AND FATIGUE QUESTIONNAIRES
SLEEP PATTERN: NORMAL
RESTFUL SLEEP: YES
DO YOU HAVE DIFFICULTY SLEEPING: NO
DO YOU USE A SLEEP AID: YES
AVERAGE NUMBER OF SLEEP HOURS: 8
DIFFICULTY FALLING ASLEEP: NO
DIFFICULTY ARISING: NO
DIFFICULTY STAYING ASLEEP: NO

## 2019-04-08 ASSESSMENT — PAIN SCALES - GENERAL
PAINLEVEL_OUTOF10: 5
PAINLEVEL_OUTOF10: 7

## 2019-04-08 ASSESSMENT — PATIENT HEALTH QUESTIONNAIRE - PHQ9: SUM OF ALL RESPONSES TO PHQ QUESTIONS 1-9: 8

## 2019-04-08 NOTE — PLAN OF CARE
Group Therapy Note    Date: 4/8/2019  Start Time: 1100  End Time:  4481  Number of Participants: 12    Type of Group: Psychoeducation    Wellness Binder Information  Module Name:  emotional wellness  Session Number:  1    Patient's Goal:  validation of feelings    Notes:  pt was verbally prompted to attend group. Pt refused. Information about emotional wellness was provided. Status After Intervention:      Participation Level:     Participation Quality:       Speech:         Thought Process/Content:       Affective Functioning:       Mood:       Level of consciousness:        Response to Learning:       Endings:     Modes of Intervention:       Discipline Responsible: Psychoeducational Specialist      Signature:  Arlet Ardon
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
## 2019-04-08 NOTE — ED NOTES
Libby Real with [address] at bedside for evaluation at this time.      Michael Moulton RN  04/08/19 4408
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
## 2019-04-08 NOTE — PROGRESS NOTES
Requirement Note     SW met with pt to complete Psychosocial within 72 hours, CSSRS within 24 hours, and treatment plan signature sheet within 72 hours. SW explained treatment goals with pt. Decreasing depression and anxiety by improvement of positive coping patterns was discussed. Pt acknowledged understanding of treatment goals and signed treatment plan signature sheet. In the last 6 months has the pt been a danger to self: YES  In the last 6 months has the pt been a danger to others: NO    Provided pt with [employer] handout entitled \"Quitting Smoking. \"  Reviewed handout with pt addressing dangers of smoking, developing coping skills, and providing basic information about quitting. Patient refused/declined practical counseling of tobacco practical counseling during the hospital stay.

## 2019-04-08 NOTE — ED PROVIDER NOTES
flank pain, urgency, vaginal bleeding and vaginal discharge. Musculoskeletal: Negative for back pain, myalgias and neck stiffness. Skin: Positive for wound (laceration to wrist.. ). Negative for color change, pallor and rash. Neurological: Negative for dizziness, weakness, light-headedness, numbness and headaches. Psychiatric/Behavioral: Positive for self-injury (bleach drinking and cutting) and suicidal ideas. Negative for agitation, behavioral problems, confusion and hallucinations. The patient is nervous/anxious. PAST MEDICALHISTORY     Past Medical History:   Diagnosis Date    Bipolar disorder ([facility] [address]     Depression     Liver disease     Hep C    Neuropathy          SURGICAL HISTORY       Past Surgical History:   Procedure Laterality Date    APPENDECTOMY           CURRENT MEDICATIONS     Previous Medications    ARIPIPRAZOLE (ABILIFY) 10 MG TABLET    Take 10 mg by mouth daily    BUPRENORPHINE HCL-NALOXONE HCL (SUBOXONE SL)    Place under the tongue    FLUOXETINE (PROZAC) 40 MG CAPSULE        GABAPENTIN (NEURONTIN) 600 MG TABLET        IBUPROFEN (ADVIL;MOTRIN) 400 MG TABLET    Take 1 tablet by mouth every 6 hours as needed for Pain    LAMOTRIGINE (LAMICTAL) 100 MG TABLET        LUTERA 0.1-20 MG-MCG PER TABLET           ALLERGIES     Patient has no known allergies.     FAMILY HISTORY       Family History   Problem Relation Age of Onset    Liver Disease Mother         Hep C    Colon Cancer Neg Hx     Colon Polyps Neg Hx     Esophageal Cancer Neg Hx     Liver Cancer Neg Hx     Rectal Cancer Neg Hx     Stomach Cancer Neg Hx           SOCIAL HISTORY       Social History     Socioeconomic History    Marital status:      Spouse name: None    Number of children: None    Years of education: None    Highest education level: None   Occupational History    None   Social Needs    Financial resource strain: None    Food insecurity:     Worry: None     Inability: None    Transportation no rebound and no guarding. Musculoskeletal: Normal range of motion. She exhibits no edema. Neurological: She is alert and oriented to person, place, and time. No cranial nerve deficit. Skin: Skin is warm and dry. No rash noted. Psychiatric: Her speech is normal. Her mood appears anxious. She is hyperactive. She expresses impulsivity. She expresses suicidal ideation. She expresses suicidal plans. Nursing note and vitals reviewed.       DIAGNOSTIC RESULTS     EKG: All EKG's areinterpreted by the Emergency Department Physician who either signs or Co-signs this chart in the absence of a cardiologist.        RADIOLOGY:  Non-plain film images such as CT, Ultrasound and MRI are read by the radiologist. Plain radiographic images are visualized and preliminarily interpreted bythe emergency physician with the below findings:          No orders to display           LABS:  Labs Reviewed   CBC WITH AUTO DIFFERENTIAL - Abnormal; Notable for the following components:       Result Value    MCHC 32.4 (*)     MPV 9.2 (*)     Neutrophils % 65.5 (*)     All other components within normal limits   COMPREHENSIVE METABOLIC PANEL - Abnormal; Notable for the following components:     (*)      (*)     All other components within normal limits   URINE DRUG SCREEN - Abnormal; Notable for the following components:    Amphetamine Screen, Urine Positive (*)     Benzodiazepine Screen, Urine Positive (*)     All other components within normal limits   URINE RT REFLEX TO CULTURE - Abnormal; Notable for the following components:    Ketones, Urine 40 (*)     Protein, UA 30 (*)     Leukocyte Esterase, Urine SMALL (*)     All other components within normal limits   MICROSCOPIC URINALYSIS - Abnormal; Notable for the following components:    Hyaline Casts, UA 13 (*)     WBC, UA 17 (*)     All other components within normal limits   URINE CULTURE   ACETAMINOPHEN LEVEL   ETHANOL   SALICYLATE LEVEL   PREGNANCY, URINE       All other Approximation:  Close    V[place] border: well-aligned    Post-procedure details:     Dressing:  Adhesive bandage    Patient tolerance of procedure: Tolerated well, no immediate complications        FINAL IMPRESSION      1. Suicidal thoughts    2. Laceration of left wrist, initial encounter    3.  Ingestion of bleach, intentional self-harm, initial encounter [facility])          [address] Admitted 04/08/2019 06:36:43 AM      PATIENT REFERRED TO:  Leonidas Barnard MD  [address] [id number] [phone]            DISCHARGE MEDICATIONS:  New Prescriptions    No medications on file          (Please note that portions of this note were completed with a voice recognition program.  Efforts were made to edit thedictations but occasionally words are mis-transcribed.)    Charan Floyd MD (electronically signed)  Attending Emergency Physician          Renae Ac MD  04/08/19 9171

## 2019-04-08 NOTE — ED NOTES
Bed: 05  Expected date:   Expected time:   Means of arrival:   Comments:  FABIANA Maria, RN  04/08/19 0145
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
## 2019-04-08 NOTE — PLAN OF CARE
Group Therapy Note    Date: 4/8/2019  Start Time: 7321  End Time:  1600  Number of Participants: 7    Type of Group: Recovery    Wellness Binder Information  Module Name:  relapse prevention  Session Number:  3    Patient's Goal:  too much stress can lead to relapse    Notes:  pt was verbally prompted to attend group. Pt refused information about relapse prevention was provided. Status After Intervention:      Participation Level:     Participation Quality:       Speech:         Thought Process/Content:       Affective Functioning:       Mood:       Level of consciousness:        Response to Learning:       Endings:     Modes of Intervention:       Discipline Responsible: Psychoeducational Specialist      Signature:  Carmina Diamond

## 2019-04-08 NOTE — PROGRESS NOTES
Group Therapy Note    Start Time: 900  End Time:  930  Number of Participants: 14    Type of Group: Community Meeting       Patient's Goal:        Notes:  Patient did not attend group. Participation Level:       Participation Quality:        Thought Process/Content:       Affective Functioning:       Mood:       Level of consciousness:        Modes of Intervention: Support      Discipline Responsible: Behavioral Health Tech II      Signature:  Rere Anton

## 2019-04-08 NOTE — PROGRESS NOTES
BHI Daily Shift Assessment  Nursing Progress Note    Room: [facility]/[address]01 Name: Fran Keller Age: 41 y.o.     Gender: female   Dx: Bipolar affective disorder, depressed, moderate ([address]  Precautions: suicide risk  Target Symptoms:   Accu-Chek: NoSleep: No,Sleep Quality Poor SI active [facility]  ADLs: No Speech: normal Depression: DOESN'T RATE Anxiety: DOESN'T RATE    Participation LevelMinimal  Visitation: Yes   Participation QualityResistant    Complaints:    Notes:     Signature: Sheree Ibrahim RN

## 2019-04-08 NOTE — PROGRESS NOTES
Patient brought to the unit by ed staff and security in stable condition. Patient told Elizabeth Dress that she is about to flip out on staff. Will give reoport to day shift.     Electronically signed by Leslie Roland RN on 4/8/2019 at 7:11 AM

## 2019-04-09 LAB
CHOLESTEROL, TOTAL: 137 MG/DL (ref 160–199)
HBA1C MFR BLD: 4.6 % (ref 4–6)
HDLC SERPL-MCNC: 53 MG/DL (ref 65–121)
LDL CHOLESTEROL CALCULATED: 69 MG/DL
TRIGL SERPL-MCNC: 73 MG/DL (ref 0–149)
TSH SERPL DL<=0.05 MIU/L-ACNC: 1.35 UIU/ML (ref 0.27–4.2)
VITAMIN B-12: 566 PG/ML (ref 211–946)
VITAMIN D 25-HYDROXY: 23.6 NG/ML

## 2019-04-09 PROCEDURE — 84443 ASSAY THYROID STIM HORMONE: CPT

## 2019-04-09 PROCEDURE — 82607 VITAMIN B-12: CPT

## 2019-04-09 PROCEDURE — 6370000000 HC RX 637 (ALT 250 FOR IP): Performed by: NURSE PRACTITIONER

## 2019-04-09 PROCEDURE — 99231 SBSQ HOSP IP/OBS SF/LOW 25: CPT | Performed by: NURSE PRACTITIONER

## 2019-04-09 PROCEDURE — 80061 LIPID PANEL: CPT

## 2019-04-09 PROCEDURE — 83036 HEMOGLOBIN GLYCOSYLATED A1C: CPT

## 2019-04-09 PROCEDURE — 1240000000 HC EMOTIONAL WELLNESS R&B

## 2019-04-09 PROCEDURE — 82306 VITAMIN D 25 HYDROXY: CPT

## 2019-04-09 PROCEDURE — 36415 COLL VENOUS BLD VENIPUNCTURE: CPT

## 2019-04-09 RX ORDER — GABAPENTIN 600 MG/1
600 TABLET ORAL 3 TIMES DAILY
Status: DISCONTINUED | OUTPATIENT
Start: 2019-04-09 | End: 2019-04-11 | Stop reason: HOSPADM

## 2019-04-09 RX ORDER — ERGOCALCIFEROL (VITAMIN D2) 1250 MCG
50000 CAPSULE ORAL WEEKLY
Qty: 11 CAPSULE | Refills: 0 | Status: SHIPPED | OUTPATIENT
Start: 2019-04-09 | End: 2019-04-11

## 2019-04-09 RX ORDER — ERGOCALCIFEROL 1.25 MG/1
50000 CAPSULE ORAL WEEKLY
Status: DISCONTINUED | OUTPATIENT
Start: 2019-04-09 | End: 2019-04-11 | Stop reason: HOSPADM

## 2019-04-09 RX ADMIN — CLONAZEPAM 0.5 MG: 0.5 TABLET ORAL at 10:33

## 2019-04-09 RX ADMIN — FLUOXETINE 40 MG: 20 CAPSULE ORAL at 10:33

## 2019-04-09 RX ADMIN — LAMOTRIGINE 150 MG: 150 TABLET ORAL at 10:33

## 2019-04-09 RX ADMIN — GABAPENTIN 600 MG: 600 TABLET, FILM COATED ORAL at 21:24

## 2019-04-09 RX ADMIN — DICYCLOMINE HYDROCHLORIDE 10 MG: 10 CAPSULE ORAL at 10:33

## 2019-04-09 RX ADMIN — LAMOTRIGINE 150 MG: 150 TABLET ORAL at 21:25

## 2019-04-09 RX ADMIN — CLONAZEPAM 0.5 MG: 0.5 TABLET ORAL at 21:25

## 2019-04-09 RX ADMIN — DICYCLOMINE HYDROCHLORIDE 10 MG: 10 CAPSULE ORAL at 06:31

## 2019-04-09 RX ADMIN — GABAPENTIN 300 MG: 600 TABLET, FILM COATED ORAL at 10:33

## 2019-04-09 NOTE — PLAN OF CARE
Group Therapy Note    Date: 4/9/2019  Start Time: 0443  End Time:  1600  Number of Participants: 7    Type of Group: Recovery    Wellness Binder Information  Module Name:  staying well  Session Number:  1    Patient's Goal:  daily maintenance and coping skills    Notes:  pt was verbally prompted to attend group. Pt refused. Information about coping skills was provided. Status After Intervention:      Participation Level:     Participation Quality:       Speech:         Thought Process/Content:       Affective Functioning:       Mood:       Level of consciousness:        Response to Learning:       Endings:     Modes of Intervention:       Discipline Responsible: Psychoeducational Specialist      Signature:  Senia Pan

## 2019-04-09 NOTE — PLAN OF CARE
Group Therapy Note    Date: 4/9/2019  Start Time: 1100  End Time:  0374  Number of Participants: 10    Type of Group: Psychoeducation    Wellness Binder Information  Module Name:  emotional wellness  Session Number:  5    Patient's Goal:  obstacles to emotional wellness    Notes:  pt was verbally prompted to attend group. Pt refused. Information about emotional wellness was provided. Status After Intervention:      Participation Level:     Participation Quality:       Speech:         Thought Process/Content:       Affective Functioning:       Mood:       Level of consciousness:        Response to Learning:       Endings:     Modes of Intervention:       Discipline Responsible: Psychoeducational Specialist      Signature:  Isael Bowers

## 2019-04-09 NOTE — PROGRESS NOTES
Group Therapy Note    Start Time: 989  End Time:  717  Number of Participants: 12    Type of Group: Community Meeting       Patient's Goal:        Notes:  Patient did not attend group. Participation Level:       Participation Quality:        Thought Process/Content:       Affective Functioning:       Mood:       Level of consciousness:        Modes of Intervention: Support      Discipline Responsible: Behavioral Health Tech II      Signature:  Nina Dong

## 2019-04-09 NOTE — PROGRESS NOTES
[address] [place] [address]      Psychiatric Progress Note    Name:  Arnol Verma  Date:  4/9/2019  Age:  41 y.o. Sex:  female  Ethnicity:   Primary Care Physician:  Max Ernandez MD   Patient Care Team:  Patient Care Team:  Max Ernandez MD as PCP - General (Pediatrics)  Max Ernandez MD as Referring Physician (Pediatrics)  SHAUN De Luna (Family Nurse Practitioner)  Chief Complaint: drank 2 sips of bleach            Historian:patient  Complaint Type: anxiety, decreased appetite, depression, illegal drug usage, loss of interest in favorite activities, sleep disturbance and tobacco use  Course of Symptoms: ongoing  Precipitating Factors: polysubstance abuse        Subjective  Patient reports sleep as \"good. \" She denies SI, HI and psychosis and is not responding to internal stimuli at this time. She is lying in her bed at this time. She reports that her boyfriend will be visiting her tonight. She reports that she plans on going back to live with her boyfriend. Patient has been calm and cooperative with staff and peers. Patient has been compliant with medications. Patient has been attending groups. Patient reports appetite as \"it's ok. \" Patient reports no side effects from medications. Previous Psychiatric/Substance Use History      Medical History:  Past Medical History:   Diagnosis Date    B[facility] disorder ([facility] [address]     Depression     Liver disease     Hep C    Neuropathy         SANCHEZ History:   Social History     Substance and Sexual Activity   Alcohol Use No         Social History     Substance and Sexual Activity   Drug Use Yes    Types: Marijuana, Cocaine, IV, Methamphetamines    Comment: Lasted used 11/12/2014- Completed Rehab ([facility]) 11/16/2015.         Social History     Tobacco Use   Smoking Status Current Every Day Smoker    Packs/day: 1.00    Years: 18.00    Pack years: 18.00   Smokeless Tobacco Never Used        Family History:     Family History   Problem Relation Age of Onset    Liver Disease Mother         Hep C    Colon Cancer Neg Hx     Colon Polyps Neg Hx     Esophageal Cancer Neg Hx     Liver Cancer Neg Hx     Rectal Cancer Neg Hx     Stomach Cancer Neg Hx          Vital Signs:  Last set of tests and vitals:  Vitals:    04/09/19 0717   BP: 105/60   Pulse: 93   Resp: 20   Temp: 98.2 °F (36.8 °C)   SpO2: 94%          Mental Status:  Level of consciousness:  within normal limits and awake  Appearance:  well-appearing, hospital attire, lying in bed, fair grooming and fair hygiene  Behavior/Motor:  no abnormalities noted  Attitude toward examiner:  cooperative, attentive and good eye contact  Speech:  normal rate and normal volume  Mood:  \" I am feeling really tired today. \"  Affect:  mood congruent  Thought processes:  linear and goal directed  Thought content:  Homocidal ideation :denies  Suicidal Ideation:  denies suicidal ideation  Delusions:  no evidence of delusions  Perceptual Disturbance:  denies any perceptual disturbance  Cognition:  oriented to person, place, and time  Concentration : fair  Memory intact for recent and remote  Fund of knowledge:  average  Abstract thinking:  adequate  Insight:  improved  Judgment:  good     gabapentin  600 mg Oral TID    FLUoxetine  40 mg Oral Daily    lamoTRIgine  150 mg Oral BID    dicyclomine  10 mg Oral TID AC       Current Medications:  Current Facility-Administered Medications   Medication Dose Route Frequency Provider Last Rate Last Dose    gabapentin (NEURONTIN) tablet 600 mg  600 mg Oral TID Lynne Ket, APRN        acetaminophen (TYLENOL) tablet 650 mg  650 mg Oral Q4H PRN Lynne Ket, APRN   650 mg at 04/08/19 1457    ibuprofen (ADVIL;MOTRIN) tablet 400 mg  400 mg Oral Q6H PRN Lynne Ket, APRN        FLUoxetine (PROZAC) capsule 40 mg  40 mg Oral Daily Lynne Ket, APRN   40 mg at 04/09/19 1033    lamoTRIgine (LAMICTAL) tablet 150 mg  150 mg Oral BID Lynne Ket, APRN 150 mg at 04/09/19 1033    clonazePAM (KLONOPIN) tablet 0.5 mg  0.5 mg Oral BID PRN Heddy Minion, APRN   0.5 mg at 04/09/19 1033    dicyclomine (BENTYL) capsule 10 mg  10 mg Oral TID AC Heddy Minion, APRN   10 mg at 04/09/19 1033    loperamide (IMODIUM) capsule 2 mg  2 mg Oral 4x Daily PRN Heddy Minion, APRN        cloNIDine (CATAPRES) tablet 0.1 mg  0.1 mg Oral Q4H PRN Heddy Minion, APRN        ondansetron (ZOFRAN) tablet 4 mg  4 mg Oral Q8H PRN Heddy Minion, APRN           Psychotherapy:   SUPPORTIVE    DSM V Diagnoses:      ACTIVE MEDICAL PROBLEMS:  Patient Active Problem List   Diagnosis    Hepatitis C antibody test positive    Suicidal thoughts    Bipolar affective disorder, depressed, moderate ([facility] [address]             Plan:    Encourage group therapy  15 minute safety checks  Medical monitoring by Dr. Eamon Adams and associates  Continue current therapy and medications  Social work to obtain collateral    Amount of time spent with patient:  15 minutes with greater than 50% of the time spent in counseling and collaboration of care.     Baljinder Haque APRN  Clinician Signature: signed electronically

## 2019-04-09 NOTE — PROGRESS NOTES
BHI Daily Shift Assessment  Nursing Progress Note    Room: [facility]-01 Name: Leti Owusu Age: 41 y.o. Gender: female   Dx: Bipolar affective disorder, depressed, moderate ([address]  Precautions: suicide risk  Target Symptoms:   Accu-Chek: NoSleep: Yes,Sleep Quality Good SI denies AVH denies [facility]  ADLs: Yes Speech: normal Depression: \"I've just been sick\" Anxiety: \"I've just been sick\"   Participation Level  Visitation:    Participation Quality    Complaints:    Notes: Pt up in day room during interview. Pt calm and cooperative. Pt affect is flat. Pt is easily irritable. Pt denies SI, HI, and AVH. Pt does not rate depression or anxiety stating, \"I've just been sick form not having suboxone. That's why you haven't seen me. \" Pt reports decreased appetite. Pt is not social and does not go to group. Pt did not do ADLs. Pt reports good sleep. Signature:    Jie Hinojosa RN

## 2019-04-09 NOTE — PLAN OF CARE
Group Therapy Note    Date: 4/9/2019  Start Time: 1430  End Time:  8559  Number of Participants: 7    Type of Group: Cognitive Skills    Wellness Binder Information  Module Name:  social wellness  Session Number:  1    Patient's Goal:  your support network    Notes:  pt was verbally prompted to attend group. Pt refused. Information about support network was provided. Status After Intervention:      Participation Level:     Participation Quality:       Speech:         Thought Process/Content:       Affective Functioning:       Mood:       Level of consciousness:        Response to Learning:       Endings:     Modes of Intervention:       Discipline Responsible: Psychoeducational Specialist      Signature:  Patrick Chandler
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
## 2019-04-09 NOTE — PLAN OF CARE
Problem: Suicide risk  Description  Suicide risk  Goal: Provide patient with safe environment  Description  Provide patient with safe environment  Outcome: Ongoing     Problem: Health Behavior:  Goal: Ability to verbalize adaptive coping strategies to use when suicidal feelings occur will improve  Description  Ability to verbalize adaptive coping strategies to use when suicidal feelings occur will improve  Outcome: Ongoing  Goal: Ability to verbalize adaptive coping strategies to use when the urge to self-mutilate occurs will improve  Description  Ability to verbalize adaptive coping strategies to use when the urge to self-mutilate occurs will improve  Outcome: Ongoing     Problem: Safety:  Goal: Ability to contract for his/her safety will improve  Description  Ability to contract for his/her safety will improve  Outcome: Ongoing

## 2019-04-10 PROBLEM — T54.91XA INGESTION OF BLEACH: Status: ACTIVE | Noted: 2019-04-10

## 2019-04-10 LAB — URINE CULTURE, ROUTINE: NORMAL

## 2019-04-10 PROCEDURE — 1240000000 HC EMOTIONAL WELLNESS R&B

## 2019-04-10 PROCEDURE — 6370000000 HC RX 637 (ALT 250 FOR IP): Performed by: NURSE PRACTITIONER

## 2019-04-10 PROCEDURE — 99231 SBSQ HOSP IP/OBS SF/LOW 25: CPT | Performed by: NURSE PRACTITIONER

## 2019-04-10 RX ADMIN — DICYCLOMINE HYDROCHLORIDE 10 MG: 10 CAPSULE ORAL at 06:35

## 2019-04-10 RX ADMIN — LAMOTRIGINE 150 MG: 150 TABLET ORAL at 20:45

## 2019-04-10 RX ADMIN — GABAPENTIN 600 MG: 600 TABLET, FILM COATED ORAL at 20:45

## 2019-04-10 RX ADMIN — ONDANSETRON HYDROCHLORIDE 4 MG: 4 TABLET, FILM COATED ORAL at 14:17

## 2019-04-10 RX ADMIN — GABAPENTIN 600 MG: 600 TABLET, FILM COATED ORAL at 08:57

## 2019-04-10 RX ADMIN — DICYCLOMINE HYDROCHLORIDE 10 MG: 10 CAPSULE ORAL at 14:17

## 2019-04-10 RX ADMIN — ONDANSETRON HYDROCHLORIDE 4 MG: 4 TABLET, FILM COATED ORAL at 21:57

## 2019-04-10 RX ADMIN — LAMOTRIGINE 150 MG: 150 TABLET ORAL at 08:57

## 2019-04-10 RX ADMIN — CLONAZEPAM 0.5 MG: 0.5 TABLET ORAL at 20:45

## 2019-04-10 RX ADMIN — CLONAZEPAM 0.5 MG: 0.5 TABLET ORAL at 08:59

## 2019-04-10 RX ADMIN — FLUOXETINE 40 MG: 20 CAPSULE ORAL at 08:57

## 2019-04-10 RX ADMIN — IBUPROFEN 400 MG: 400 TABLET ORAL at 14:17

## 2019-04-10 RX ADMIN — GABAPENTIN 600 MG: 600 TABLET, FILM COATED ORAL at 14:17

## 2019-04-10 ASSESSMENT — PAIN SCALES - GENERAL: PAINLEVEL_OUTOF10: 5

## 2019-04-10 NOTE — PLAN OF CARE
Problem: Health Behavior:  Goal: Ability to verbalize adaptive coping strategies to use when suicidal feelings occur will improve  4/10/2019 1606 by Patrick Chandler  Outcome: Ongoing                                                                       Group Therapy Note    Date: 4/10/2019  Start Time: 1430  End Time:  8349  Number of Participants: 8    Type of Group: Cognitive Skills    Wellness Binder Information  Module Name:  emotional wellness  Session Number:  5    Patient's Goal:  obstacles to emotional wellness    Notes:  pt was verbally prompted to attend group. Pt refused. Information about obstacles to wellness was provided. Status After Intervention:      Participation Level:     Participation Quality:       Speech:         Thought Process/Content:       Affective Functioning:       Mood:       Level of consciousness:        Response to Learning:       Endings:     Modes of Intervention:       Discipline Responsible: Psychoeducational Specialist      Signature:  Patrick Chandler

## 2019-04-10 NOTE — PROGRESS NOTES
[facility] Daily Shift Assessment  Nursing Progress Note    Room: [facility]/624A-01 Name: Rick Mckeon Age: 41 y.o. Gender: female   Dx: Bipolar affective disorder, depressed, moderate ([address]  Precautions: suicide risk  Target Symptoms:   Accu-Chek: NoSleep: Yes,Sleep Quality Good SI no plan AVH denies [address] [place] [address]  ADLs: No Speech: normal Depression: 0 Anxiety: \"a little\"  Participation LevelActive Listener    Participation Paul Bradley stating \" im withdrawing from heroin and have had to stop suboxone cold turkey. \"    Notes: alert and oriented x4. Pleasant, calm and cooperative. Thinking processes are linear and goal directed. Appearance is disheveled. Ill groomed. Affect is flat and congruent with mood. Appetite is poor-having nausea and vomiting. Reports good sleep. Medication compliant. Not social or attending groups. Good eye contact and good concentration.      Signature: Huyn Salgado RN

## 2019-04-10 NOTE — PLAN OF CARE
Problem: Suicide risk  Goal: Provide patient with safe environment  Outcome: Ongoing     Problem: Health Behavior:  Goal: Ability to verbalize adaptive coping strategies to use when suicidal feelings occur will improve  Outcome: Ongoing  Goal: Ability to verbalize adaptive coping strategies to use when the urge to self-mutilate occurs will improve  Outcome: Ongoing     Problem: Safety:  Goal: Ability to contract for his/her safety will improve  Outcome: Ongoing
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
## 2019-04-10 NOTE — PLAN OF CARE
Group Therapy Note    Date: 4/10/2019  Start Time: 1100  End Time:  1151  Number of Participants: 9    Type of Group: Psychoeducation    Wellness Binder Information  Module Name:  stress  Session Number:  2    Patient's Goal:  recognizing signs of stress    Notes:  pt was verbally prompted to attend group. Pt refused. Information about signs of stress was provided. Status After Intervention:      Participation Level:     Participation Quality:       Speech:         Thought Process/Content:       Affective Functioning:       Mood:       Level of consciousness:        Response to Learning:       Endings:     Modes of Intervention:       Discipline Responsible: Psychoeducational Specialist      Signature:  Isael Bowers

## 2019-04-10 NOTE — PROGRESS NOTES
Q6H PRN Keshia Kanika, APRN        FLUoxetine (PROZAC) capsule 40 mg  40 mg Oral Daily Keshia Kanika, APRN   40 mg at 04/10/19 0857    lamoTRIgine (LAMICTAL) tablet 150 mg  150 mg Oral BID Keshia Kanika, APRN   150 mg at 04/10/19 6939    clonazePAM (KLONOPIN) tablet 0.5 mg  0.5 mg Oral BID PRN Keshia Kanika, APRN   0.5 mg at 04/10/19 0656    dicyclomine (BENTYL) capsule 10 mg  10 mg Oral TID AC Keshia Kanika, APRN   10 mg at 04/10/19 6065    loperamide (IMODIUM) capsule 2 mg  2 mg Oral 4x Daily PRN Keshia Boudreaux, APRN        cloNIDine (CATAPRES) tablet 0.1 mg  0.1 mg Oral Q4H PRN Keshia Kanika, APRN        ondansetron [facility]) tablet 4 mg  4 mg Oral Q8H PRN Keshia Boudreaux, APRN           Psychotherapy:   SUPPORTIVE    DSM V Diagnoses:      ACTIVE MEDICAL PROBLEMS:  Patient Active Problem List   Diagnosis    Hepatitis C antibody test positive    Suicidal thoughts    Bipolar affective disorder, depressed, moderate [address] [address] [facility]             Plan:    Encourage group therapy  15 minute safety checks  Medical monitoring by Dr. Toñito Beltran and associates  Continue current therapy and medications  Discharge in the am    Amount of time spent with patient:  15 minutes with greater than 50% of the time spent in counseling and collaboration of care.     Keshia Boudreaux APRN  Clinician Signature: signed electronically

## 2019-04-10 NOTE — PROGRESS NOTES
Group Therapy Note    Date: 4/10/2019  Start Time: 1600  End Time:  1630  Number of Participants: 10    Type of Group: Healthy Living/Wellness    Wellness Binder Information  Module Name:  Making New Goals    Patient's Goal:  \"I would like to do more working on my stress level. I would like to work on counseling with my . I would like to do less interrupting people. A goal for next week: spend time with my family. \"    Status After Intervention:  Improved    Participation Level: Active Listener and Interactive    Participation Quality: Appropriate, Attentive and Sharing      Speech:  normal      Thought Process/Content: Logical  Linear      Affective Functioning: Congruent      Mood: anxious and depressed      Level of consciousness:  Alert, Oriented x4 and Attentive      Response to Learning: Progressing to goal      Endings: None Reported    Modes of Intervention: Exploration      Discipline Responsible: Registered Nurse      Signature:   Paola Herrera RN
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
## 2019-04-11 VITALS
TEMPERATURE: 97.2 F | OXYGEN SATURATION: 97 % | WEIGHT: 162.8 LBS | BODY MASS INDEX: 26.16 KG/M2 | DIASTOLIC BLOOD PRESSURE: 64 MMHG | HEIGHT: 66 IN | SYSTOLIC BLOOD PRESSURE: 103 MMHG | RESPIRATION RATE: 18 BRPM | HEART RATE: 72 BPM

## 2019-04-11 PROBLEM — F15.90 STIMULANT USE DISORDER: Status: ACTIVE | Noted: 2019-04-11

## 2019-04-11 PROCEDURE — 5130000000 HC BRIDGE APPOINTMENT

## 2019-04-11 PROCEDURE — 6370000000 HC RX 637 (ALT 250 FOR IP): Performed by: NURSE PRACTITIONER

## 2019-04-11 PROCEDURE — 99238 HOSP IP/OBS DSCHRG MGMT 30/<: CPT | Performed by: NURSE PRACTITIONER

## 2019-04-11 RX ORDER — ERGOCALCIFEROL (VITAMIN D2) 1250 MCG
50000 CAPSULE ORAL WEEKLY
Qty: 12 CAPSULE | Refills: 0 | Status: SHIPPED | OUTPATIENT
Start: 2019-04-11 | End: 2019-07-03

## 2019-04-11 RX ADMIN — LAMOTRIGINE 150 MG: 150 TABLET ORAL at 08:44

## 2019-04-11 RX ADMIN — CLONAZEPAM 0.5 MG: 0.5 TABLET ORAL at 08:46

## 2019-04-11 RX ADMIN — DICYCLOMINE HYDROCHLORIDE 10 MG: 10 CAPSULE ORAL at 06:23

## 2019-04-11 RX ADMIN — GABAPENTIN 600 MG: 600 TABLET, FILM COATED ORAL at 08:44

## 2019-04-11 RX ADMIN — FLUOXETINE 40 MG: 20 CAPSULE ORAL at 08:44

## 2019-04-11 NOTE — PROGRESS NOTES
[facility] Daily Shift Assessment  Nursing Progress Note    [facility]: [facility]/624A-01 Name: Berny Bethea Age: 41 y.o. Gender: female   Dx: Bipolar affective disorder, depressed, moderate ([address]  Precautions: suicide risk  Target Symptoms:   Accu-Chek: NoSleep: Yes,Sleep Quality Good SI no plan AVH denies [address] [place] [address]  ADLs: No Speech: normal Depression: 0 Anxiety: 0   Participation LevelActive Listener and Interactive    Participation QualityAppropriate, Attentive, Sharing and Supportive    Notes: alert and oriented x4. Pleasant, calm and cooperative. Wearing street clothes, appearance is disheveled. Reports good sleep, appetite is increasing, social, attending groups, and medication compliant. Thought processes are linear and goal directed. Affect is bright and congruent. Patient states \" I feel better today and im ready to go home. \"  Withdrawal symptoms. Nausea, diarrhea, appetite, tremors have improved. Patient is in no distress.      Signature: Gopal Humphrey RN
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
## 2019-04-11 NOTE — PROGRESS NOTES
Medications:   Medication Summary Provided. I understand that I should take only the medications on my list.   --why and when I need to take each medication. --which side effects to watch for.   --that I should carry my medication information at all times in case of emergency    Situations. --I will take all medications until discontinued by physician. I will take all my medications to follow up appointments. --check with my physician or pharmacist before taking any new medication, over    the counter product or drink alcohol.   --ask about food, drug and dietary supplement interactions. --discard old lists and update records with medication providers. Behavior Health Follow Up:  Original Referral Source: ED  Discharge Diagnosis:   Patient Active Problem List   Diagnosis    Hepatitis C antibody test positive    Suicidal thoughts    Bipolar affective disorder, depressed, moderate (HCC)    Ingestion of bleach     Recomendations for Level of Care:  Follow Up  Patient Status at Discharge: Stable  [facility]  Aftercare plan faxed:    Faxed by: Social Work staff   Date: 19     Prescriptions sent to pharmacy    General Information:   Questions regarding your bill: Call [phone]   Suicide Hotline (Rescue Crisis) 6-507.225.9370   To obtain results of pending studies call Medical Records at: [phone]   For emergencies and 24 hour/7 days a week contact information: [phone]

## 2019-04-11 NOTE — PROGRESS NOTES
Patient belongings and valuables inventoried with patient and staff. Scripts, medications, and appointments  reviewed with verbal readback understanding by patient with patient to continue routine discharge medications until discontinued by your provider. Patient denies SI, HI and AVH at this time, is not delusional, psychotic, paranoid or manic.

## 2019-04-11 NOTE — PROGRESS NOTES
Bridge Appointment completed: Reviewed Discharge Instructions with patient. Patient verbalizes understanding and agreement with the discharge plan using the teachback method.      Referral for Outpatient Tobacco Cessation Counseling, upon discharge (yahaira X if applicable and completed):    ( )  Hospital staff assisted patient to call Quit Line or faxed referral                                   during hospitalization                  (x )  Recognizing danger situations (included triggers and roadblocks), if not completed on admission                    (x )  Coping skills (new ways to manage stress, exercise, relaxation techniques, changing routine, distraction), if not completed on admission                                                           (x )  Basic information about quitting (benefits of quitting, techniques in how to quit, available resources, if not completed on admission  ( ) Referral for counseling faxed to [facility]   (x ) Patient refused referral  (x ) Patient refused counseling  (x ) Patient refused smoking cessation medication upon discharge    Vaccinations (yahaira X if applicable and completed):  ( ) Patient states already received influenza vaccine elsewhere  ( ) Patient received influenza vaccine during this hospitalization  (x ) Patient refused influenza vaccine at this time

## 2019-04-11 NOTE — PROGRESS NOTES
Group Note  Date: 4/10/19  Start Time:   End Time:    Number of Participants in Group:   Number of Patients on Unit:  Reason for Absence:  Intervention for patient absence:       Type of Group:  Wrap-Up/Relaxation __       Patient's Goal:  Met Goal:    x         Did not Met Goal:          Staff Intervention:      Participation Level:  Tried alot       Patient Response to Group: participated       Notes:     NIRALI Woodward
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
## 2019-04-11 NOTE — PROGRESS NOTES
Progress Note  Uche Bowling  4/11/2019 4:05 AM  Subjective:   Admit Date:   4/8/2019      CC/ADMIT DX:       Interval History:   Reviewed overnight events and nursing notes. She has no new medical concerns. I have reviewed all labs/diagnostics from the last 24hrs. ROS:   I have done a 10 point ROS and all are negative, except what is mentioned in the HPI. DIET GENERAL;    Medications:      gabapentin  600 mg Oral TID    vitamin D  50,000 Units Oral Weekly    FLUoxetine  40 mg Oral Daily    lamoTRIgine  150 mg Oral BID    dicyclomine  10 mg Oral TID AC           Objective:   Vitals: /65   Pulse 80   Temp 98.3 °F (36.8 °C)   Resp 20   Ht 5' 6\" (1.676 m)   Wt 162 lb 12.8 oz (73.8 kg)   SpO2 98%   BMI 26.28 kg/m²  No intake or output data in the 24 hours ending 04/11/19 0405  General appearance: alert and cooperative with exam  Lungs: clear to auscultation bilaterally  Heart: RRR  Abdomen: soft, non-tender; bowel sounds normal; no masses,  no organomegaly  Extremities: extremities normal, atraumatic, no cyanosis or edema  Neurologic:  No obvious focal neurologic deficits. Assessment and Plan:   Principal Problem:    Bipolar affective disorder, depressed, moderate ([address]  Active Problems:    Suicidal thoughts    Ingestion of bleach  Resolved Problems:    * No resolved hospital problems. *    Vit D Def    Plan:  1. Continue present medication(s)   2. She is medically stable. I will monitor for any changes or concerns. 3.  Follow with Psych      Discharge planning:   her home     Reviewed treatment plans with the patient and/or family.              Electronically signed by Neel Willett MD on 4/11/2019 at 4:05 AM

## 2019-04-12 NOTE — PROGRESS NOTES
Progress Note  Michelle Roll  4/11/2019 11:37 PM  Subjective:   Admit Date:   4/8/2019      CC/ADMIT DX:       Interval History:   Reviewed overnight events and nursing notes. She denies any new physical complaints. I have reviewed all labs/diagnostics from the last 24hrs. ROS:   I have done a 10 point ROS and all are negative, except what is mentioned in the HPI. No diet orders on file    Medications:             Objective:   Vitals: /64   Pulse 72   Temp 97.2 °F (36.2 °C) (Temporal)   Resp 18   Ht 5' 6\" (1.676 m)   Wt 162 lb 12.8 oz (73.8 kg)   SpO2 97%   BMI 26.28 kg/m²  No intake or output data in the 24 hours ending 04/11/19 6857  General appearance: alert and cooperative with exam  Lungs: clear to auscultation bilaterally  Heart: RRR  Abdomen: soft, non-tender; bowel sounds normal; no masses,  no organomegaly  Extremities: extremities normal, atraumatic, no cyanosis or edema  Neurologic:  No obvious focal neurologic deficits. Assessment and Plan:   Principal Problem:    Bipolar affective disorder, depressed, moderate ([address]  Active Problems:    Suicidal thoughts    Ingestion of bleach    Stimulant use disorder  Resolved Problems:    * No resolved hospital problems. *    Vit D Def    Plan:  1. Continue present medication(s)   2. She remans medically stable. I will monitor for any changes or new concerns. 3.  Follow with Psych      Discharge planning:   her home     Reviewed treatment plans with the patient and/or family.              Electronically signed by Carmelo Cool MD on 4/11/2019 at 11:37 PM

## 2019-11-16 ENCOUNTER — HOSPITAL ENCOUNTER (EMERGENCY)
Age: 39
Discharge: HOME | End: 2019-11-17
Attending: EMERGENCY MEDICINE
Payer: COMMERCIAL

## 2019-11-16 DIAGNOSIS — Z63.0 STRESS DUE TO MARITAL PROBLEMS: ICD-10-CM

## 2019-11-16 DIAGNOSIS — F19.10 POLYSUBSTANCE ABUSE (HCC): Primary | ICD-10-CM

## 2019-11-16 LAB
ACETAMINOPHEN LEVEL: <15 UG/ML
ALBUMIN SERPL-MCNC: 4.1 G/DL (ref 3.5–5.2)
ALP BLD-CCNC: 72 U/L (ref 35–104)
ALT SERPL-CCNC: 59 U/L (ref 5–33)
AMPHETAMINE SCREEN, URINE: POSITIVE
ANION GAP SERPL CALCULATED.3IONS-SCNC: 15 MMOL/L (ref 7–19)
AST SERPL-CCNC: 84 U/L (ref 5–32)
BARBITURATE SCREEN URINE: NEGATIVE
BASOPHILS ABSOLUTE: 0 K/UL (ref 0–0.2)
BASOPHILS RELATIVE PERCENT: 0.3 % (ref 0–1)
BENZODIAZEPINE SCREEN, URINE: POSITIVE
BILIRUB SERPL-MCNC: 0.9 MG/DL (ref 0.2–1.2)
BUN BLDV-MCNC: 9 MG/DL (ref 6–20)
CALCIUM SERPL-MCNC: 8.9 MG/DL (ref 8.6–10)
CANNABINOID SCREEN URINE: NEGATIVE
CHLORIDE BLD-SCNC: 100 MMOL/L (ref 98–111)
CO2: 20 MMOL/L (ref 22–29)
COCAINE METABOLITE SCREEN URINE: NEGATIVE
CREAT SERPL-MCNC: 0.7 MG/DL (ref 0.5–0.9)
EOSINOPHILS ABSOLUTE: 0.1 K/UL (ref 0–0.6)
EOSINOPHILS RELATIVE PERCENT: 1 % (ref 0–5)
ETHANOL: <10 MG/DL (ref 0–0.08)
GFR NON-AFRICAN AMERICAN: >60
GLUCOSE BLD-MCNC: 86 MG/DL (ref 74–109)
HCG QUALITATIVE: NEGATIVE
HCT VFR BLD CALC: 38.1 % (ref 37–47)
HEMOGLOBIN: 12.7 G/DL (ref 12–16)
IMMATURE GRANULOCYTES #: 0 K/UL
LYMPHOCYTES ABSOLUTE: 2.1 K/UL (ref 1.1–4.5)
LYMPHOCYTES RELATIVE PERCENT: 26.2 % (ref 20–40)
Lab: ABNORMAL
MCH RBC QN AUTO: 30.9 PG (ref 27–31)
MCHC RBC AUTO-ENTMCNC: 33.3 G/DL (ref 33–37)
MCV RBC AUTO: 92.7 FL (ref 81–99)
MONOCYTES ABSOLUTE: 0.6 K/UL (ref 0–0.9)
MONOCYTES RELATIVE PERCENT: 8.2 % (ref 0–10)
NEUTROPHILS ABSOLUTE: 5 K/UL (ref 1.5–7.5)
NEUTROPHILS RELATIVE PERCENT: 64 % (ref 50–65)
OPIATE SCREEN URINE: NEGATIVE
PDW BLD-RTO: 11.9 % (ref 11.5–14.5)
PLATELET # BLD: 133 K/UL (ref 130–400)
PMV BLD AUTO: 9.6 FL (ref 9.4–12.3)
POTASSIUM SERPL-SCNC: 3.5 MMOL/L (ref 3.5–5)
RBC # BLD: 4.11 M/UL (ref 4.2–5.4)
SALICYLATE, SERUM: <3 MG/DL (ref 3–10)
SODIUM BLD-SCNC: 135 MMOL/L (ref 136–145)
TOTAL PROTEIN: 6.9 G/DL (ref 6.6–8.7)
WBC # BLD: 7.8 K/UL (ref 4.8–10.8)

## 2019-11-16 PROCEDURE — 99284 EMERGENCY DEPT VISIT MOD MDM: CPT

## 2019-11-16 PROCEDURE — G0480 DRUG TEST DEF 1-7 CLASSES: HCPCS

## 2019-11-16 PROCEDURE — 80307 DRUG TEST PRSMV CHEM ANLYZR: CPT

## 2019-11-16 PROCEDURE — 84703 CHORIONIC GONADOTROPIN ASSAY: CPT

## 2019-11-16 PROCEDURE — 36415 COLL VENOUS BLD VENIPUNCTURE: CPT

## 2019-11-16 PROCEDURE — 85025 COMPLETE CBC W/AUTO DIFF WBC: CPT

## 2019-11-16 PROCEDURE — 80053 COMPREHEN METABOLIC PANEL: CPT

## 2019-11-16 SDOH — SOCIAL STABILITY - SOCIAL INSECURITY: PROBLEMS IN RELATIONSHIP WITH SPOUSE OR PARTNER: Z63.0

## 2019-11-16 ASSESSMENT — ENCOUNTER SYMPTOMS
VOMITING: 0
DIARRHEA: 0
COUGH: 0
SHORTNESS OF BREATH: 0
NAUSEA: 0
SORE THROAT: 0
ABDOMINAL PAIN: 0
RHINORRHEA: 0
BACK PAIN: 0

## 2019-11-16 ASSESSMENT — LIFESTYLE VARIABLES: HISTORY_ALCOHOL_USE: NO

## 2019-11-17 VITALS
OXYGEN SATURATION: 94 % | TEMPERATURE: 98.8 F | HEART RATE: 115 BPM | RESPIRATION RATE: 22 BRPM | SYSTOLIC BLOOD PRESSURE: 143 MMHG | DIASTOLIC BLOOD PRESSURE: 93 MMHG

## 2019-11-17 PROCEDURE — 6370000000 HC RX 637 (ALT 250 FOR IP): Performed by: EMERGENCY MEDICINE

## 2019-11-17 RX ORDER — LOPERAMIDE HYDROCHLORIDE 2 MG/1
2 CAPSULE ORAL ONCE
Status: COMPLETED | OUTPATIENT
Start: 2019-11-17 | End: 2019-11-17

## 2019-11-17 RX ORDER — ACETAMINOPHEN 325 MG/1
650 TABLET ORAL ONCE
Status: COMPLETED | OUTPATIENT
Start: 2019-11-17 | End: 2019-11-17

## 2019-11-17 RX ADMIN — LOPERAMIDE HYDROCHLORIDE 2 MG: 2 CAPSULE ORAL at 00:41

## 2019-11-17 RX ADMIN — ACETAMINOPHEN 650 MG: 325 TABLET ORAL at 00:40

## 2019-11-17 ASSESSMENT — PAIN SCALES - GENERAL: PAINLEVEL_OUTOF10: 5

## 2019-11-19 ENCOUNTER — HOSPITAL ENCOUNTER (EMERGENCY)
Age: 39
Discharge: ANOTHER HOSPITAL | End: 2019-11-19
Attending: FAMILY MEDICINE
Payer: COMMERCIAL

## 2019-11-19 VITALS
OXYGEN SATURATION: 98 % | DIASTOLIC BLOOD PRESSURE: 82 MMHG | TEMPERATURE: 98.6 F | HEART RATE: 109 BPM | SYSTOLIC BLOOD PRESSURE: 125 MMHG | RESPIRATION RATE: 20 BRPM

## 2019-11-19 DIAGNOSIS — R44.3 HALLUCINATIONS: Primary | ICD-10-CM

## 2019-11-19 DIAGNOSIS — S41.111A LACERATION OF RIGHT UPPER EXTREMITY, INITIAL ENCOUNTER: ICD-10-CM

## 2019-11-19 LAB
ALBUMIN SERPL-MCNC: 4.1 G/DL (ref 3.5–5.2)
ALP BLD-CCNC: 67 U/L (ref 35–104)
ALT SERPL-CCNC: 60 U/L (ref 5–33)
AMPHETAMINE SCREEN, URINE: POSITIVE
ANION GAP SERPL CALCULATED.3IONS-SCNC: 17 MMOL/L (ref 7–19)
AST SERPL-CCNC: 93 U/L (ref 5–32)
BACTERIA: NEGATIVE /HPF
BARBITURATE SCREEN URINE: NEGATIVE
BASOPHILS ABSOLUTE: 0 K/UL (ref 0–0.2)
BASOPHILS RELATIVE PERCENT: 0.4 % (ref 0–1)
BENZODIAZEPINE SCREEN, URINE: POSITIVE
BILIRUB SERPL-MCNC: 0.7 MG/DL (ref 0.2–1.2)
BILIRUBIN URINE: ABNORMAL
BLOOD, URINE: NEGATIVE
BUN BLDV-MCNC: 8 MG/DL (ref 6–20)
CALCIUM SERPL-MCNC: 9.2 MG/DL (ref 8.6–10)
CANNABINOID SCREEN URINE: NEGATIVE
CHLORIDE BLD-SCNC: 104 MMOL/L (ref 98–111)
CLARITY: CLEAR
CO2: 20 MMOL/L (ref 22–29)
COCAINE METABOLITE SCREEN URINE: NEGATIVE
COLOR: ABNORMAL
CREAT SERPL-MCNC: 0.8 MG/DL (ref 0.5–0.9)
EOSINOPHILS ABSOLUTE: 0.1 K/UL (ref 0–0.6)
EOSINOPHILS RELATIVE PERCENT: 1.2 % (ref 0–5)
EPITHELIAL CELLS, UA: 6 /HPF (ref 0–5)
ETHANOL: <10 MG/DL (ref 0–0.08)
GFR NON-AFRICAN AMERICAN: >60
GLUCOSE BLD-MCNC: 139 MG/DL (ref 74–109)
GLUCOSE URINE: NEGATIVE MG/DL
HCG(URINE) PREGNANCY TEST: NEGATIVE
HCT VFR BLD CALC: 34.9 % (ref 37–47)
HEMOGLOBIN: 11.4 G/DL (ref 12–16)
HYALINE CASTS: 16 /HPF (ref 0–8)
IMMATURE GRANULOCYTES #: 0 K/UL
KETONES, URINE: 80 MG/DL
LEUKOCYTE ESTERASE, URINE: NEGATIVE
LYMPHOCYTES ABSOLUTE: 1.6 K/UL (ref 1.1–4.5)
LYMPHOCYTES RELATIVE PERCENT: 16.7 % (ref 20–40)
Lab: ABNORMAL
MAGNESIUM: 1.7 MG/DL (ref 1.6–2.6)
MCH RBC QN AUTO: 30.3 PG (ref 27–31)
MCHC RBC AUTO-ENTMCNC: 32.7 G/DL (ref 33–37)
MCV RBC AUTO: 92.8 FL (ref 81–99)
MONOCYTES ABSOLUTE: 0.8 K/UL (ref 0–0.9)
MONOCYTES RELATIVE PERCENT: 8.9 % (ref 0–10)
NEUTROPHILS ABSOLUTE: 6.8 K/UL (ref 1.5–7.5)
NEUTROPHILS RELATIVE PERCENT: 72.5 % (ref 50–65)
NITRITE, URINE: NEGATIVE
OPIATE SCREEN URINE: NEGATIVE
PDW BLD-RTO: 12 % (ref 11.5–14.5)
PH UA: 5.5 (ref 5–8)
PLATELET # BLD: 179 K/UL (ref 130–400)
PMV BLD AUTO: 9.3 FL (ref 9.4–12.3)
POTASSIUM REFLEX MAGNESIUM: 3.3 MMOL/L (ref 3.5–5)
PROTEIN UA: 30 MG/DL
RBC # BLD: 3.76 M/UL (ref 4.2–5.4)
RBC UA: 2 /HPF (ref 0–4)
SODIUM BLD-SCNC: 141 MMOL/L (ref 136–145)
SPECIFIC GRAVITY UA: 1.02 (ref 1–1.03)
TOTAL PROTEIN: 6.8 G/DL (ref 6.6–8.7)
UROBILINOGEN, URINE: 0.2 E.U./DL
WBC # BLD: 9.3 K/UL (ref 4.8–10.8)
WBC UA: 5 /HPF (ref 0–5)

## 2019-11-19 PROCEDURE — 83735 ASSAY OF MAGNESIUM: CPT

## 2019-11-19 PROCEDURE — 12002 RPR S/N/AX/GEN/TRNK2.6-7.5CM: CPT

## 2019-11-19 PROCEDURE — 84703 CHORIONIC GONADOTROPIN ASSAY: CPT

## 2019-11-19 PROCEDURE — 96372 THER/PROPH/DIAG INJ SC/IM: CPT

## 2019-11-19 PROCEDURE — 6360000002 HC RX W HCPCS: Performed by: PHYSICIAN ASSISTANT

## 2019-11-19 PROCEDURE — G0480 DRUG TEST DEF 1-7 CLASSES: HCPCS

## 2019-11-19 PROCEDURE — 2500000003 HC RX 250 WO HCPCS: Performed by: FAMILY MEDICINE

## 2019-11-19 PROCEDURE — 85025 COMPLETE CBC W/AUTO DIFF WBC: CPT

## 2019-11-19 PROCEDURE — 99285 EMERGENCY DEPT VISIT HI MDM: CPT

## 2019-11-19 PROCEDURE — 36415 COLL VENOUS BLD VENIPUNCTURE: CPT

## 2019-11-19 PROCEDURE — 80053 COMPREHEN METABOLIC PANEL: CPT

## 2019-11-19 PROCEDURE — 6370000000 HC RX 637 (ALT 250 FOR IP): Performed by: FAMILY MEDICINE

## 2019-11-19 PROCEDURE — 81001 URINALYSIS AUTO W/SCOPE: CPT

## 2019-11-19 PROCEDURE — 2500000003 HC RX 250 WO HCPCS: Performed by: PHYSICIAN ASSISTANT

## 2019-11-19 PROCEDURE — 80307 DRUG TEST PRSMV CHEM ANLYZR: CPT

## 2019-11-19 PROCEDURE — 96374 THER/PROPH/DIAG INJ IV PUSH: CPT

## 2019-11-19 PROCEDURE — 6360000002 HC RX W HCPCS: Performed by: FAMILY MEDICINE

## 2019-11-19 RX ORDER — ALPRAZOLAM 0.5 MG/1
0.5 TABLET ORAL ONCE
Status: COMPLETED | OUTPATIENT
Start: 2019-11-19 | End: 2019-11-19

## 2019-11-19 RX ORDER — ZIPRASIDONE MESYLATE 20 MG/ML
10 INJECTION, POWDER, LYOPHILIZED, FOR SOLUTION INTRAMUSCULAR ONCE
Status: COMPLETED | OUTPATIENT
Start: 2019-11-19 | End: 2019-11-19

## 2019-11-19 RX ORDER — CLONAZEPAM 1 MG/1
0.5 TABLET ORAL ONCE
Status: DISCONTINUED | OUTPATIENT
Start: 2019-11-19 | End: 2019-11-19 | Stop reason: HOSPADM

## 2019-11-19 RX ORDER — LIDOCAINE HYDROCHLORIDE AND EPINEPHRINE 10; 10 MG/ML; UG/ML
20 INJECTION, SOLUTION INFILTRATION; PERINEURAL ONCE
Status: COMPLETED | OUTPATIENT
Start: 2019-11-19 | End: 2019-11-19

## 2019-11-19 RX ORDER — LIDOCAINE HYDROCHLORIDE 10 MG/ML
5 INJECTION, SOLUTION EPIDURAL; INFILTRATION; INTRACAUDAL; PERINEURAL ONCE
Status: COMPLETED | OUTPATIENT
Start: 2019-11-19 | End: 2019-11-19

## 2019-11-19 RX ORDER — LORAZEPAM 2 MG/ML
1 INJECTION INTRAMUSCULAR ONCE
Status: COMPLETED | OUTPATIENT
Start: 2019-11-19 | End: 2019-11-19

## 2019-11-19 RX ADMIN — ZIPRASIDONE MESYLATE 10 MG: 20 INJECTION, POWDER, LYOPHILIZED, FOR SOLUTION INTRAMUSCULAR at 20:20

## 2019-11-19 RX ADMIN — LORAZEPAM 1 MG: 2 INJECTION INTRAMUSCULAR; INTRAVENOUS at 15:15

## 2019-11-19 RX ADMIN — LIDOCAINE HYDROCHLORIDE,EPINEPHRINE BITARTRATE 20 ML: 10; .01 INJECTION, SOLUTION INFILTRATION; PERINEURAL at 15:15

## 2019-11-19 RX ADMIN — LORAZEPAM 1 MG: 2 INJECTION INTRAMUSCULAR; INTRAVENOUS at 19:01

## 2019-11-19 RX ADMIN — ALPRAZOLAM 0.5 MG: 0.5 TABLET ORAL at 17:19

## 2019-11-19 RX ADMIN — LIDOCAINE HYDROCHLORIDE 5 ML: 10 INJECTION, SOLUTION EPIDURAL; INFILTRATION; INTRACAUDAL; PERINEURAL at 15:15

## 2019-11-19 ASSESSMENT — ENCOUNTER SYMPTOMS
TROUBLE SWALLOWING: 0
CONSTIPATION: 0
SHORTNESS OF BREATH: 0
ABDOMINAL PAIN: 0
VOMITING: 0
APNEA: 0
WHEEZING: 0
CHEST TIGHTNESS: 0
ABDOMINAL DISTENTION: 0
COUGH: 0
BACK PAIN: 0
SORE THROAT: 0
DIARRHEA: 0

## 2019-11-19 ASSESSMENT — PAIN SCALES - GENERAL
PAINLEVEL_OUTOF10: 10
PAINLEVEL_OUTOF10: 10

## 2019-11-19 ASSESSMENT — PAIN DESCRIPTION - PAIN TYPE: TYPE: ACUTE PAIN

## 2020-02-02 ENCOUNTER — HOSPITAL ENCOUNTER (EMERGENCY)
Facility: HOSPITAL | Age: 40
Discharge: HOME | End: 2020-02-02
Admitting: EMERGENCY MEDICINE

## 2020-02-02 VITALS
HEIGHT: 66 IN | RESPIRATION RATE: 15 BRPM | HEART RATE: 64 BPM | TEMPERATURE: 97.7 F | BODY MASS INDEX: 28.93 KG/M2 | DIASTOLIC BLOOD PRESSURE: 65 MMHG | OXYGEN SATURATION: 97 % | SYSTOLIC BLOOD PRESSURE: 116 MMHG | WEIGHT: 180 LBS

## 2020-02-02 DIAGNOSIS — K04.7 DENTAL ABSCESS: Primary | ICD-10-CM

## 2020-02-02 DIAGNOSIS — Z72.0 TOBACCO USE: ICD-10-CM

## 2020-02-02 PROCEDURE — 96372 THER/PROPH/DIAG INJ SC/IM: CPT

## 2020-02-02 PROCEDURE — 99283 EMERGENCY DEPT VISIT LOW MDM: CPT

## 2020-02-02 RX ORDER — IBUPROFEN 800 MG/1
800 TABLET ORAL
Qty: 30 TABLET | Refills: 0 | OUTPATIENT
Start: 2020-02-02 | End: 2020-03-31 | Stop reason: HOSPADM

## 2020-02-02 RX ORDER — OXYCODONE HYDROCHLORIDE AND ACETAMINOPHEN 5; 325 MG/1; MG/1
1 TABLET ORAL ONCE
Status: COMPLETED | OUTPATIENT
Start: 2020-02-02 | End: 2020-02-02

## 2020-02-02 RX ORDER — CLINDAMYCIN HYDROCHLORIDE 300 MG/1
300 CAPSULE ORAL 4 TIMES DAILY
Qty: 40 CAPSULE | Refills: 0 | Status: SHIPPED | OUTPATIENT
Start: 2020-02-02 | End: 2020-02-12

## 2020-02-02 RX ORDER — CLINDAMYCIN PHOSPHATE 150 MG/ML
600 INJECTION, SOLUTION INTRAVENOUS ONCE
Status: COMPLETED | OUTPATIENT
Start: 2020-02-02 | End: 2020-02-02

## 2020-02-02 RX ADMIN — CLINDAMYCIN PHOSPHATE 600 MG: 150 INJECTION, SOLUTION INTRAMUSCULAR; INTRAVENOUS at 13:10

## 2020-02-02 RX ADMIN — OXYCODONE HYDROCHLORIDE AND ACETAMINOPHEN 1 TABLET: 5; 325 TABLET ORAL at 12:38

## 2020-02-02 NOTE — ED PROVIDER NOTES
Subjective   Pt is 40yo white female presents to er with dental pain. She states that she has had increased dental pain to left lower incisor. She states that she has had left jaw pain and swelling for the past 2 days. She states that she is supposed to have all teeth extracted and have dentures in about 2 months.       History provided by:  Patient   used: No        Review of Systems   Constitutional: Negative.    HENT:        Pt is 40yo white female presents to er with dental pain. She states that she has had increased dental pain to left lower incisor. She states that she has had left jaw pain and swelling for the past 2 days. She states that she is supposed to have all teeth extracted and have dentures in about 2 months.      Eyes: Negative.    Respiratory: Negative.    Cardiovascular: Negative.    Gastrointestinal: Negative.    Endocrine: Negative.    Genitourinary: Negative.    Musculoskeletal: Negative.    Skin: Negative.    Allergic/Immunologic: Negative.    Neurological: Negative.    Hematological: Negative.    Psychiatric/Behavioral: Negative.    All other systems reviewed and are negative.      Past Medical History:   Diagnosis Date   • Anxiety    • Bipolar affective disorder ([facility])    • Depression    • Kidney infection        No Known Allergies    Past Surgical History:   Procedure Laterality Date   • APPENDECTOMY     • CYSTOSCOPY W/ URETERAL STENT PLACEMENT Left 10/6/2018    Procedure: CYSTOSCOPY LEFT URETERAL STENT INSERTION;  Surgeon: Tyrone Machuca MD;  Location: [facility] OR;  Service: Urology   • TONSILLECTOMY     • URETEROSCOPY LASER LITHOTRIPSY WITH STENT INSERTION Left 10/17/2018    Procedure: URETEROSCOPY LASER LITHOTRIPSY WITH STENT EXCHANGE STONE EXTRACTION;  Surgeon: Tyrone Machuca MD;  Location: [facility] OR;  Service: Urology       No family history on file.    Social History     Socioeconomic History   • Marital status:      Spouse name: Not on file   •  "Number of children: Not on file   • Years of education: Not on file   • Highest education level: Not on file   Tobacco Use   • Smoking status: Current Every Day Smoker     Packs/day: 0.50     Years: 20.00     Pack years: 10.00     Types: Cigarettes   • Smokeless tobacco: Never Used   Substance and Sexual Activity   • Alcohol use: No   • Drug use: Yes     Comment: sober for several years now.   • Sexual activity: Defer       Prior to Admission medications    Medication Sig Start Date End Date Taking? Authorizing Provider   ARIPiprazole (ABILIFY) 5 MG tablet Take 5 mg by mouth Every Night.    Carolyne Willis MD   FLUoxetine (PROzac) 40 MG capsule Take 40 capsules by mouth Daily. 3/2/16   Carolyne Willis MD   gabapentin (NEURONTIN) 400 MG capsule Take 400 mg by mouth 3 (Three) Times a Day. 3/3/16   Carolyne Willis MD   lamoTRIgine (LaMICtal) 150 MG tablet Take 150 mg by mouth 2 (Two) Times a Day. 3/2/16   Carolyne Willis MD   levonorgestrel-ethinyl estradiol (LUTERA) 0.1-20 MG-MCG per tablet Take 1 tablet by mouth Daily. 3/2/16   Carolyne Willis MD       /65 (BP Location: Left arm, Patient Position: Sitting)   Pulse 64   Temp 97.7 °F (36.5 °C) (Oral)   Resp 15   Ht 167.6 cm (66\")   Wt 81.6 kg (180 lb)   LMP 01/25/2020 (Approximate)   SpO2 97%   BMI 29.05 kg/m²     Objective   Physical Exam   Constitutional: She is oriented to person, place, and time. She appears well-developed and well-nourished.   Pt is crying in pain    HENT:   Head: Normocephalic and atraumatic.   Generalized poor dental hygiene noted with mult rotting teeth. The tooth to the left lower incisor is broken off and there is surrounding eryth and soft tissue swelling around the tooth. No fluctuant abscess noted. No trismus. Mild soft tissue swelling noted to left jaw as well     Eyes: Pupils are equal, round, and reactive to light. Conjunctivae and EOM are normal.   Neck: Normal range of motion. Neck supple. " No tracheal deviation present. No thyromegaly present.   Cardiovascular: Normal rate, regular rhythm, normal heart sounds and intact distal pulses.   Pulmonary/Chest: Effort normal and breath sounds normal. No respiratory distress. She has no wheezes. She has no rales. She exhibits no tenderness.   Abdominal: Soft. Bowel sounds are normal.   Musculoskeletal: Normal range of motion.   Neurological: She is alert and oriented to person, place, and time. She has normal reflexes. No cranial nerve deficit.   Skin: Skin is warm and dry.   Psychiatric: She has a normal mood and affect. Her behavior is normal. Judgment and thought content normal.   Nursing note and vitals reviewed.      Procedures         Lab Results (last 24 hours)     ** No results found for the last 24 hours. **          No orders to display       ED Course  ED Course as of Feb 02 1406   Sun Feb 02, 2020   1240 Oliver report completed #[id number]. Pt is currently on suboxone and will not be able to prescribe any further pain medications. Will prescribe motrin 800mg po and clindamycin     [CW]   1313 Patient asked again if she could be prescribe opiate pain medication for her tooth.  Advised patient that she is on Suboxone which was not listed on her initial medication list.  Advised the patient that taking Suboxone with opiates can cause complications and put her in danger.  Advised she needs to take Motrin 800 mg 3 times daily for the toothache.  She is also advised again to stop smoking as this will make the pain worse in her mouth.    [CW]      ED Course User Index  [CW] Felicita Boo APRN          [facility]  Number of Diagnoses or Management Options  Dental abscess: minor  Tobacco use: minor  Patient Progress  Patient progress: stable      Final diagnoses:   Dental abscess   Tobacco use          Felicita Boo APRN  02/02/20 1406

## 2020-02-02 NOTE — DISCHARGE INSTRUCTIONS
Return to ER if symptoms worsen   Moist heat compresses to left side of jaw  Warm salt water rinses three times a day  Stop smoking     Dental Abscess    A dental abscess is a collection of pus in or around a tooth that results from an infection. An abscess can cause pain in the affected area as well as other symptoms. Treatment is important to help with symptoms and to prevent the infection from spreading.  What are the causes?  This condition is caused by a bacterial infection around the root of the tooth that involves the inner part of the tooth (pulp). It may result from:  · Severe tooth decay.  · Trauma to the tooth, such as a broken or chipped tooth, that allows bacteria to enter into the pulp.  · Severe gum disease around a tooth.  What increases the risk?  This condition is more likely to develop in males. It is also more likely to develop in people who:  · Have dental decay (cavities).  · Eat sugary snacks between meals.  · Use tobacco products.  · Have diabetes.  · Have a weakened disease-fighting system (immune system).  · Do not brush and care for their teeth regularly.  What are the signs or symptoms?  Symptoms of this condition include:  · Severe pain in and around the infected tooth.  · Swelling and redness around the infected tooth, in the mouth, or in the face.  · Tenderness.  · Pus drainage.  · Bad breath.  · Bitter taste in the mouth.  · Difficulty swallowing.  · Difficulty opening the mouth.  · Nausea.  · Vomiting.  · Chills.  · Swollen neck glands.  · Fever.  How is this diagnosed?  This condition is diagnosed based on:  · Your symptoms and your medical and dental history.  · An examination of the infected tooth. During the exam, your dentist may tap on the infected tooth.  You may also have X-rays of the affected area.  How is this treated?  This condition is treated by getting rid of the infection. This may be done with:  · Incision and drainage. This procedure is done by making an incision in  the abscess to drain out the pus. Removing pus is the first priority in treating an abscess.  · Antibiotic medicines. These may be used in certain situations.  · Antibacterial mouth rinse.  · A root canal. This may be performed to save the tooth. Your dentist accesses the visible part of your tooth (crown) with a drill and removes any damaged pulp. Then the space is filled and sealed off.  · Tooth extraction. The tooth is pulled out if it cannot be saved by other treatment.  You may also receive treatment for pain, such as:  · Acetaminophen or NSAIDs.  · Gels that contain a numbing medicine.  · An injection to block the pain near your nerve.  Follow these instructions at home:  Medicines  · Take over-the-counter and prescription medicines only as told by your dentist.  · If you were prescribed an antibiotic, take it as told by your dentist. Do not stop taking the antibiotic even if you start to feel better.  · If you were prescribed a gel that contains a numbing medicine, use it exactly as told in the directions. Do not use these gels for children who are younger than 2 years of age.  · Do not drive or use heavy machinery while taking prescription pain medicine.  General instructions  · Rinse out your mouth often with salt water to relieve pain or swelling. To make a salt-water mixture, completely dissolve ½-1 tsp of salt in 1 cup of warm water.  · Eat a soft diet while your abscess is healing.  · Drink enough fluid to keep your urine pale yellow.  · Do not apply heat to the outside of your mouth.  · Do not use any products that contain nicotine or tobacco, such as cigarettes and e-cigarettes. If you need help quitting, ask your health care provider.  · Keep all follow-up visits as told by your dentist. This is important.  How is this prevented?  · Brush your teeth every morning and night with fluoride toothpaste. Floss one time each day.  · Get regularly scheduled dental cleanings.  · Consider having a dental  sealant applied on teeth that have deep holes (caries).  · Drink fluoridated water regularly. This includes most tap water. Check the label on bottled water to see if it contains fluoride.  · Drink water instead of sugary drinks.  · Eat healthy meals and snacks.  · Wear a mouth guard or face shield to protect your teeth while playing sports.  Contact a health care provider if:  · Your pain is worse and is not helped by medicine.  Get help right away if:  · You have a fever or chills.  · Your symptoms suddenly get worse.  · You have a very bad headache.  · You have problems breathing or swallowing.  · You have trouble opening your mouth.  · You have swelling in your neck or around your eye.  Summary  · A dental abscess is a collection of pus in or around a tooth that results from an infection.  · A dental abscess may result from severe tooth decay, trauma to the tooth, or severe gum disease around a tooth.  · Symptoms include severe pain, swelling, redness, and drainage of pus in and around the infected tooth.  · The first priority in treating a dental abscess is to drain out the pus. Treatment may also involve removing damage inside the tooth (root canal) or pulling out (extracting) the tooth.  This information is not intended to replace advice given to you by your health care provider. Make sure you discuss any questions you have with your health care provider.  Document Released: 12/18/2006 Document Revised: 08/20/2018 Document Reviewed: 08/20/2018  [employer] Interactive Patient Education © 2019 [employer]      Health Risks of Smoking  Smoking cigarettes is very bad for your health. Tobacco smoke has over 200 known poisons in it. It contains the poisonous gases nitrogen oxide and carbon monoxide. There are over 60 chemicals in tobacco smoke that cause cancer.  Smoking is difficult to quit because a chemical in tobacco, called nicotine, causes addiction or dependence. When you smoke and inhale, nicotine is  absorbed rapidly into the bloodstream through your lungs. Both inhaled and non-inhaled nicotine may be addictive.  What are the risks of cigarette smoke?  Cigarette smokers have an increased risk of many serious medical problems, including:  · Lung cancer.  · Lung disease, such as pneumonia, bronchitis, and emphysema.  · Chest pain (angina) and heart attack because the heart is not getting enough oxygen.  · Heart disease and peripheral blood vessel disease.  · High blood pressure (hypertension).  · Stroke.  · Oral cancer, including cancer of the lip, mouth, or voice box.  · Bladder cancer.  · Pancreatic cancer.  · Cervical cancer.  · Pregnancy complications, including premature birth.  · Stillbirths and smaller  babies, birth defects, and genetic damage to sperm.  · Early menopause.  · Lower estrogen level for women.  · Infertility.  · Facial wrinkles.  · Blindness.  · Increased risk of broken bones (fractures).  · Senile dementia.  · Stomach ulcers and internal bleeding.  · Delayed wound healing and increased risk of complications during surgery.  · Even smoking lightly shortens your life expectancy by several years.  Because of secondhand smoke exposure, children of smokers have an increased risk of the following:  · Sudden infant death syndrome (SIDS).  · Respiratory infections.  · Lung cancer.  · Heart disease.  · Ear infections.  What are the benefits of quitting?  There are many health benefits of quitting smoking. Here are some of them:  · Within days of quitting smoking, your risk of having a heart attack decreases, your blood flow improves, and your lung capacity improves. Blood pressure, pulse rate, and breathing patterns start returning to normal soon after quitting.  · Within months, your lungs may clear up completely.  · Quitting for 10 years reduces your risk of developing lung cancer and heart disease to almost that of a nonsmoker.  · People who quit may see an improvement in their overall  quality of life.  How do I quit smoking?         Smoking is an addiction with both physical and psychological effects, and longtime habits can be hard to change. Your health care provider can recommend:  · Programs and community resources, which may include group support, education, or talk therapy.  · Prescription medicines to help reduce cravings.  · Nicotine replacement products, such as patches, gum, and nasal sprays. Use these products only as directed. Do not replace cigarette smoking with electronic cigarettes, which are commonly called e-cigarettes. The safety of e-cigarettes is not known, and some may contain harmful chemicals.  · A combination of two or more of these methods.  Where to find more information  · American Lung Association: www.lung.org  · American Cancer Society: www.cancer.org  Summary  · Smoking cigarettes is very bad for your health. Cigarette smokers have an increased risk of many serious medical problems, including several cancers, heart disease, and stroke.  · Smoking is an addiction with both physical and psychological effects, and longtime habits can be hard to change.  · By stopping right away, you can greatly reduce the risk of medical problems for you and your family.  · To help you quit smoking, your health care provider can recommend programs, community resources, prescription medicines, and nicotine replacement products such as patches, gum, and nasal sprays.  This information is not intended to replace advice given to you by your health care provider. Make sure you discuss any questions you have with your health care provider.  Document Released: 01/25/2006 Document Revised: 03/21/2019 Document Reviewed: 12/22/2017  [employer] Interactive Patient Education © 2019 [employer]      Preventive Dental Care, Adult  Preventive dental care includes seeing a dentist regularly and practicing good dental care (oral hygiene) at home. These actions can help to prevent cavities, root canal  problems, gum disease (gingivitis), tooth loss, and other tooth problems. Regular dental exams may also help your health care provider diagnose other medical problems. Many diseases, including mouth cancers, have early signs that can be found during a preventive dental care visit.  What can I expect for my preventive dental care visit?  Counseling  At your visit, your dentist may ask you about:  · Your overall health and diet.  · Any new symptoms, such as:  ? Bleeding gums.  ? Mouth, tooth, or jaw pain.  ? Dull headache.  · Using a mouthguard for sports or because of teeth grinding.  · The need or desire to get braces to straighten teeth (orthodontic care).  Physical exam  Your dentist will do a mouth (oral) exam to check for:  · Cavities.  · Gum disease or other problems.  · Signs of cancer.  · Neck swelling or lumps.  · Abnormal jaw movement or pain in the jaw joint.  · Signs of teeth grinding, such as loose or fractured teeth.  Other services  You may also have:  · Dental X-rays.  · Your teeth cleaned.  Follow these instructions at home:  Oral health         · Brush with fluoride toothpaste every morning and night. If possible, brush within 10 minutes after every meal. Ask your dentist for toothpaste recommendations.  · Floss at least one time every day.  · Check your teeth for white or brown spots after brushing. These may be signs of cavities.  · Check your gums for swelling or bleeding. These may be signs of gum disease.  · Take over-the-counter and prescription medicines only as told by your dentist.  · If you have a permanent tooth knocked out:  ? Find the tooth.  ? Pick it up by the top (crown) with a tissue or gauze.  ? Wash the tooth for no more than 10 seconds under cold, running water.  ? Try to put the tooth back into the gum socket.  ? Put the tooth in a glass of milk if you cannot get it back in place.  ? Go to your dentist or an emergency department right away. Take the tooth with you.  Eating and  drinking  · Eat a diet that includes plenty of fruits, vegetables, milk and dairy products, whole grains, and proteins. Do not eat a lot of starchy foods or foods with added sugar. Talk with your health care provider if you have questions about following a healthy diet.  · Avoid sodas, sugary snacks, and sticky candies.  · Choose water or milk instead of fruit juice, sodas, or sports drinks.  General instructions  · Do not use any products that contain nicotine or tobacco, such as cigarettes, e-cigarettes, and chewing tobacco. If you need help quitting, ask your health care provider.  · Do not get mouth piercings.  · Always wear a mouthguard when playing contact or collision sports.  For more information:  · American Dental Association: www.mouthhealthy.org  Contact a dentist if you have:  · Gum, tooth, or jaw pain.  · Red, swollen, or bleeding gums.  · A tooth or teeth that are very sensitive to hot or cold.  · Very bad breath.  · A problem with a filling, crown, implant, or denture.  · A broken or loose tooth.  · A growth or sore in your mouth that is not going away.  · A dry mouth.  What's next?  · See your dentist one or two times each year for oral exams and cleanings.  · If you have an early problem, like a cavity, your dentist will schedule time for you to get treatment. If you have a tooth root problem, gum disease, or a sign of another disease, your dentist may send you to see another health care provider for care.  This information is not intended to replace advice given to you by your health care provider. Make sure you discuss any questions you have with your health care provider.  Document Released: 09/07/2016 Document Revised: 10/11/2019 Document Reviewed: 08/05/2019  [employer] Interactive Patient Education © 2019 [employer]

## 2020-03-31 ENCOUNTER — HOSPITAL ENCOUNTER (EMERGENCY)
Facility: HOSPITAL | Age: 40
Discharge: HOME | End: 2020-03-31
Admitting: INTERNAL MEDICINE

## 2020-03-31 ENCOUNTER — APPOINTMENT (OUTPATIENT)
Dept: ULTRASOUND IMAGING | Facility: HOSPITAL | Age: 40
End: 2020-03-31

## 2020-03-31 VITALS
RESPIRATION RATE: 18 BRPM | BODY MASS INDEX: 28.66 KG/M2 | HEART RATE: 84 BPM | WEIGHT: 172 LBS | TEMPERATURE: 98.1 F | HEIGHT: 65 IN | SYSTOLIC BLOOD PRESSURE: 126 MMHG | OXYGEN SATURATION: 99 % | DIASTOLIC BLOOD PRESSURE: 85 MMHG

## 2020-03-31 DIAGNOSIS — Z3A.09 9 WEEKS GESTATION OF PREGNANCY: Primary | ICD-10-CM

## 2020-03-31 LAB
ABO GROUP BLD: NORMAL
ALBUMIN SERPL-MCNC: 4.7 G/DL (ref 3.5–5.2)
ALBUMIN/GLOB SERPL: 1.4 G/DL
ALP SERPL-CCNC: 84 U/L (ref 39–117)
ALT SERPL W P-5'-P-CCNC: 35 U/L (ref 1–33)
ANION GAP SERPL CALCULATED.3IONS-SCNC: 12 MMOL/L (ref 5–15)
AST SERPL-CCNC: 35 U/L (ref 1–32)
BASOPHILS # BLD AUTO: 0.04 10*3/MM3 (ref 0–0.2)
BASOPHILS NFR BLD AUTO: 0.4 % (ref 0–1.5)
BILIRUB SERPL-MCNC: 0.3 MG/DL (ref 0.2–1.2)
BILIRUB UR QL STRIP: NEGATIVE
BLD GP AB SCN SERPL QL: NEGATIVE
BUN BLD-MCNC: 3 MG/DL (ref 6–20)
BUN/CREAT SERPL: 4.4 (ref 7–25)
CALCIUM SPEC-SCNC: 9.9 MG/DL (ref 8.6–10.5)
CHLORIDE SERPL-SCNC: 104 MMOL/L (ref 98–107)
CLARITY UR: CLEAR
CO2 SERPL-SCNC: 25 MMOL/L (ref 22–29)
COLOR UR: YELLOW
CREAT BLD-MCNC: 0.68 MG/DL (ref 0.57–1)
DEPRECATED RDW RBC AUTO: 41.1 FL (ref 37–54)
EOSINOPHIL # BLD AUTO: 0.25 10*3/MM3 (ref 0–0.4)
EOSINOPHIL NFR BLD AUTO: 2.6 % (ref 0.3–6.2)
ERYTHROCYTE [DISTWIDTH] IN BLOOD BY AUTOMATED COUNT: 12.8 % (ref 12.3–15.4)
GFR SERPL CREATININE-BSD FRML MDRD: 97 ML/MIN/1.73
GLOBULIN UR ELPH-MCNC: 3.4 GM/DL
GLUCOSE BLD-MCNC: 87 MG/DL (ref 65–99)
GLUCOSE UR STRIP-MCNC: NEGATIVE MG/DL
HCG INTACT+B SERPL-ACNC: NORMAL MIU/ML
HCT VFR BLD AUTO: 41.4 % (ref 34–46.6)
HGB BLD-MCNC: 13.8 G/DL (ref 12–15.9)
HGB UR QL STRIP.AUTO: NEGATIVE
IMM GRANULOCYTES # BLD AUTO: 0.02 10*3/MM3 (ref 0–0.05)
IMM GRANULOCYTES NFR BLD AUTO: 0.2 % (ref 0–0.5)
KETONES UR QL STRIP: NEGATIVE
LEUKOCYTE ESTERASE UR QL STRIP.AUTO: NEGATIVE
LYMPHOCYTES # BLD AUTO: 3.01 10*3/MM3 (ref 0.7–3.1)
LYMPHOCYTES NFR BLD AUTO: 31.9 % (ref 19.6–45.3)
MCH RBC QN AUTO: 29.3 PG (ref 26.6–33)
MCHC RBC AUTO-ENTMCNC: 33.3 G/DL (ref 31.5–35.7)
MCV RBC AUTO: 87.9 FL (ref 79–97)
MONOCYTES # BLD AUTO: 0.49 10*3/MM3 (ref 0.1–0.9)
MONOCYTES NFR BLD AUTO: 5.2 % (ref 5–12)
NEUTROPHILS # BLD AUTO: 5.64 10*3/MM3 (ref 1.7–7)
NEUTROPHILS NFR BLD AUTO: 59.7 % (ref 42.7–76)
NITRITE UR QL STRIP: NEGATIVE
NRBC BLD AUTO-RTO: 0 /100 WBC (ref 0–0.2)
NUMBER OF DOSES: NORMAL
PH UR STRIP.AUTO: 6.5 [PH] (ref 5–8)
PLATELET # BLD AUTO: 195 10*3/MM3 (ref 140–450)
PMV BLD AUTO: 9.9 FL (ref 6–12)
POTASSIUM BLD-SCNC: 4 MMOL/L (ref 3.5–5.2)
PROT SERPL-MCNC: 8.1 G/DL (ref 6–8.5)
PROT UR QL STRIP: NEGATIVE
RBC # BLD AUTO: 4.71 10*6/MM3 (ref 3.77–5.28)
RH BLD: POSITIVE
SODIUM BLD-SCNC: 141 MMOL/L (ref 136–145)
SP GR UR STRIP: <=1.005 (ref 1–1.03)
UROBILINOGEN UR QL STRIP: NORMAL
WBC NRBC COR # BLD: 9.45 10*3/MM3 (ref 3.4–10.8)

## 2020-03-31 PROCEDURE — 87086 URINE CULTURE/COLONY COUNT: CPT | Performed by: PHYSICIAN ASSISTANT

## 2020-03-31 PROCEDURE — 76817 TRANSVAGINAL US OBSTETRIC: CPT

## 2020-03-31 PROCEDURE — 99283 EMERGENCY DEPT VISIT LOW MDM: CPT

## 2020-03-31 PROCEDURE — 86900 BLOOD TYPING SEROLOGIC ABO: CPT | Performed by: PHYSICIAN ASSISTANT

## 2020-03-31 PROCEDURE — 86901 BLOOD TYPING SEROLOGIC RH(D): CPT | Performed by: PHYSICIAN ASSISTANT

## 2020-03-31 PROCEDURE — 84702 CHORIONIC GONADOTROPIN TEST: CPT | Performed by: PHYSICIAN ASSISTANT

## 2020-03-31 PROCEDURE — 80053 COMPREHEN METABOLIC PANEL: CPT | Performed by: PHYSICIAN ASSISTANT

## 2020-03-31 PROCEDURE — 81003 URINALYSIS AUTO W/O SCOPE: CPT | Performed by: PHYSICIAN ASSISTANT

## 2020-03-31 PROCEDURE — 85025 COMPLETE CBC W/AUTO DIFF WBC: CPT | Performed by: PHYSICIAN ASSISTANT

## 2020-03-31 PROCEDURE — 86850 RBC ANTIBODY SCREEN: CPT | Performed by: PHYSICIAN ASSISTANT

## 2020-03-31 RX ORDER — DOXYLAMINE SUCCINATE AND PYRIDOXINE HYDROCHLORIDE, DELAYED RELEASE TABLETS 10 MG/10 MG 10; 10 MG/1; MG/1
2 TABLET, DELAYED RELEASE ORAL ONCE
Status: COMPLETED | OUTPATIENT
Start: 2020-03-31 | End: 2020-03-31

## 2020-03-31 RX ORDER — DOXYLAMINE SUCCINATE AND PYRIDOXINE HYDROCHLORIDE, DELAYED RELEASE TABLETS 10 MG/10 MG 10; 10 MG/1; MG/1
2 TABLET, DELAYED RELEASE ORAL NIGHTLY PRN
Qty: 30 TABLET | Refills: 0 | Status: SHIPPED | OUTPATIENT
Start: 2020-03-31 | End: 2021-02-26

## 2020-03-31 RX ADMIN — DOXYLAMINE SUCCINATE AND PYRIDOXINE HYDROCHLORIDE 2 TABLET: 10; 10 TABLET, DELAYED RELEASE ORAL at 18:37

## 2020-03-31 RX ADMIN — SODIUM CHLORIDE 1000 ML: 9 INJECTION, SOLUTION INTRAVENOUS at 18:36

## 2020-04-01 LAB — BACTERIA SPEC AEROBE CULT: NO GROWTH

## 2020-04-15 ENCOUNTER — INITIAL PRENATAL (OUTPATIENT)
Dept: OBSTETRICS AND GYNECOLOGY | Facility: CLINIC | Age: 40
End: 2020-04-15

## 2020-04-15 VITALS — BODY MASS INDEX: 28.62 KG/M2 | DIASTOLIC BLOOD PRESSURE: 72 MMHG | WEIGHT: 172 LBS | SYSTOLIC BLOOD PRESSURE: 128 MMHG

## 2020-04-15 DIAGNOSIS — Z3A.12 12 WEEKS GESTATION OF PREGNANCY: Primary | ICD-10-CM

## 2020-04-15 PROCEDURE — 87491 CHLMYD TRACH DNA AMP PROBE: CPT | Performed by: OBSTETRICS & GYNECOLOGY

## 2020-04-15 PROCEDURE — G0123 SCREEN CERV/VAG THIN LAYER: HCPCS | Performed by: OBSTETRICS & GYNECOLOGY

## 2020-04-15 PROCEDURE — 87591 N.GONORRHOEAE DNA AMP PROB: CPT | Performed by: OBSTETRICS & GYNECOLOGY

## 2020-04-15 PROCEDURE — 87661 TRICHOMONAS VAGINALIS AMPLIF: CPT | Performed by: OBSTETRICS & GYNECOLOGY

## 2020-04-15 PROCEDURE — 87624 HPV HI-RISK TYP POOLED RSLT: CPT | Performed by: OBSTETRICS & GYNECOLOGY

## 2020-04-15 PROCEDURE — 99202 OFFICE O/P NEW SF 15 MIN: CPT | Performed by: OBSTETRICS & GYNECOLOGY

## 2020-04-15 PROCEDURE — 87625 HPV TYPES 16 & 18 ONLY: CPT | Performed by: OBSTETRICS & GYNECOLOGY

## 2020-04-15 RX ORDER — DOCUSATE SODIUM 100 MG/1
100 CAPSULE, LIQUID FILLED ORAL 2 TIMES DAILY
Qty: 60 CAPSULE | Refills: 1 | Status: SHIPPED | OUTPATIENT
Start: 2020-04-15 | End: 2021-02-26

## 2020-04-15 RX ORDER — BUPRENORPHINE HYDROCHLORIDE 8 MG/1
8 TABLET SUBLINGUAL 3 TIMES DAILY
COMMUNITY
Start: 2020-04-02

## 2020-04-15 RX ORDER — PROMETHAZINE HYDROCHLORIDE 12.5 MG/1
12.5 TABLET ORAL EVERY 6 HOURS PRN
Qty: 40 TABLET | Refills: 0 | Status: SHIPPED | OUTPATIENT
Start: 2020-04-15 | End: 2021-02-26

## 2020-04-15 RX ORDER — LURASIDONE HYDROCHLORIDE 60 MG/1
60 TABLET, FILM COATED ORAL DAILY
COMMUNITY
Start: 2020-03-25

## 2020-04-15 RX ORDER — VALACYCLOVIR HYDROCHLORIDE 1 G/1
TABLET, FILM COATED ORAL
COMMUNITY
End: 2020-09-15 | Stop reason: SDUPTHER

## 2020-04-15 RX ORDER — CLONAZEPAM 0.5 MG/1
0.5 TABLET ORAL 2 TIMES DAILY PRN
COMMUNITY

## 2020-04-15 RX ORDER — PROPRANOLOL HYDROCHLORIDE 10 MG/1
TABLET ORAL
COMMUNITY
Start: 2020-04-02 | End: 2020-04-15 | Stop reason: CLARIF

## 2020-04-15 RX ORDER — LABETALOL 100 MG/1
100 TABLET, FILM COATED ORAL DAILY
Qty: 30 TABLET | Refills: 0 | Status: SHIPPED | OUTPATIENT
Start: 2020-04-15 | End: 2020-06-15 | Stop reason: SDUPTHER

## 2020-04-15 NOTE — PROGRESS NOTES
at 11w3d here for initial prenatal care visit. Denies cramping or bleeding, Reports some nausea.   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   Will check hepatitis C at this time.   RTC in 4 weeks   Will have patient follow up with Dr. Etienne due to hepatitis, AMA as well as patient on Lisinopril in the beginning of pregnancy

## 2020-04-16 ENCOUNTER — TELEPHONE (OUTPATIENT)
Dept: OBSTETRICS AND GYNECOLOGY | Facility: CLINIC | Age: 40
End: 2020-04-16

## 2020-04-16 RX ORDER — PRENATAL 168/IRON/FOLIC/OMEGA3 27-800-235
1 CAPSULE ORAL DAILY
Qty: 90 CAPSULE | Refills: 1 | Status: SHIPPED | OUTPATIENT
Start: 2020-04-16 | End: 2020-06-15 | Stop reason: SDUPTHER

## 2020-04-16 NOTE — TELEPHONE ENCOUNTER
Arin calls - 11w4d.  OV 4/15/20.  States she forgot to ask about pre edouard vitamins.  Requesting script be sent to [facility].  Ok to order?

## 2020-04-20 LAB
ABO GROUP BLD: NORMAL
BACTERIA UR CULT: NORMAL
BACTERIA UR CULT: NORMAL
BASOPHILS # BLD AUTO: 0 X10E3/UL (ref 0–0.2)
BASOPHILS NFR BLD AUTO: 0 %
BLD GP AB SCN SERPL QL: NEGATIVE
C TRACH RRNA SPEC QL NAA+PROBE: NEGATIVE
DRUGS UR: NORMAL
EOSINOPHIL # BLD AUTO: 0.2 X10E3/UL (ref 0–0.4)
EOSINOPHIL NFR BLD AUTO: 3 %
ERYTHROCYTE [DISTWIDTH] IN BLOOD BY AUTOMATED COUNT: 13.3 % (ref 11.7–15.4)
HBV SURFACE AG SERPL QL IA: NEGATIVE
HCT VFR BLD AUTO: 37.2 % (ref 34–46.6)
HCV AB S/CO SERPL IA: >11 S/CO RATIO (ref 0–0.9)
HCV GENTYP SERPL NAA+PROBE: NORMAL
HCV RNA SERPL NAA+PROBE-ACNC: NORMAL IU/ML
HCV RNA SERPL NAA+PROBE-LOG IU: 5.71 LOG10 IU/ML
HGB BLD-MCNC: 12.7 G/DL (ref 11.1–15.9)
HIV 1+2 AB+HIV1 P24 AG SERPL QL IA: NON REACTIVE
IMM GRANULOCYTES # BLD AUTO: 0 X10E3/UL (ref 0–0.1)
IMM GRANULOCYTES NFR BLD AUTO: 0 %
LABORATORY COMMENT REPORT: NORMAL
LYMPHOCYTES # BLD AUTO: 2.7 X10E3/UL (ref 0.7–3.1)
LYMPHOCYTES NFR BLD AUTO: 38 %
MCH RBC QN AUTO: 30.6 PG (ref 26.6–33)
MCHC RBC AUTO-ENTMCNC: 34.1 G/DL (ref 31.5–35.7)
MCV RBC AUTO: 90 FL (ref 79–97)
MONOCYTES # BLD AUTO: 0.5 X10E3/UL (ref 0.1–0.9)
MONOCYTES NFR BLD AUTO: 6 %
N GONORRHOEA RRNA SPEC QL NAA+PROBE: NEGATIVE
NEUTROPHILS # BLD AUTO: 3.6 X10E3/UL (ref 1.4–7)
NEUTROPHILS NFR BLD AUTO: 53 %
PLATELET # BLD AUTO: 166 X10E3/UL (ref 150–450)
RBC # BLD AUTO: 4.15 X10E6/UL (ref 3.77–5.28)
RH BLD: POSITIVE
RPR SER QL: NON REACTIVE
RUBV IGG SERPL IA-ACNC: <0.9 INDEX
TEST INFORMATION: NORMAL
WBC # BLD AUTO: 7 X10E3/UL (ref 3.4–10.8)

## 2020-04-21 LAB
C TRACH RRNA CVX QL NAA+PROBE: NOT DETECTED
GEN CATEG CVX/VAG CYTO-IMP: NORMAL
HPV I/H RISK 4 DNA CVX QL PROBE+SIG AMP: DETECTED
HPV16 DNA SPEC QL NAA+PROBE: NOT DETECTED
HPV18+45 E6+E7 MRNA CVX QL NAA+PROBE: NOT DETECTED
LAB AP CASE REPORT: NORMAL
LAB AP GYN ADDITIONAL INFORMATION: NORMAL
LAB AP GYN OTHER FINDINGS: NORMAL
N GONORRHOEA RRNA SPEC QL NAA+PROBE: NOT DETECTED
PATH INTERP SPEC-IMP: NORMAL
STAT OF ADQ CVX/VAG CYTO-IMP: NORMAL
TRICHOMONAS VAGINALIS PCR: NOT DETECTED

## 2020-05-13 ENCOUNTER — ROUTINE PRENATAL (OUTPATIENT)
Dept: OBSTETRICS AND GYNECOLOGY | Facility: CLINIC | Age: 40
End: 2020-05-13

## 2020-05-13 VITALS — SYSTOLIC BLOOD PRESSURE: 126 MMHG | WEIGHT: 174 LBS | BODY MASS INDEX: 28.96 KG/M2 | DIASTOLIC BLOOD PRESSURE: 70 MMHG

## 2020-05-13 DIAGNOSIS — Z3A.15 15 WEEKS GESTATION OF PREGNANCY: Primary | ICD-10-CM

## 2020-05-13 DIAGNOSIS — R76.8 HEPATITIS C ANTIBODY TEST POSITIVE: ICD-10-CM

## 2020-05-13 LAB
GLUCOSE UR STRIP-MCNC: NEGATIVE MG/DL
PROT UR STRIP-MCNC: NEGATIVE MG/DL

## 2020-05-13 PROCEDURE — 99212 OFFICE O/P EST SF 10 MIN: CPT | Performed by: OBSTETRICS & GYNECOLOGY

## 2020-05-13 RX ORDER — METOCLOPRAMIDE 10 MG/1
10 TABLET ORAL
Qty: 30 TABLET | Refills: 0 | Status: SHIPPED | OUTPATIENT
Start: 2020-05-13 | End: 2020-10-19

## 2020-05-13 NOTE — PROGRESS NOTES
at 15.3 IUP presents for follow up prenatal care visit. No obstetrical complaints.   PE see above   A/P  at 15.3 IUP   RTC in 4 weeks   Miscarriage precautions discussed   Will start baby asa  Discussed smoking cessation as well as decreased THC.

## 2020-05-22 ENCOUNTER — TELEPHONE (OUTPATIENT)
Dept: OBSTETRICS AND GYNECOLOGY | Facility: CLINIC | Age: 40
End: 2020-05-22

## 2020-05-22 NOTE — TELEPHONE ENCOUNTER
Arin called and states she needs a letter for proof of pregnancy to be faxed to [facility].  Letter faxed to:  [facility]  Case # [id number]  Fax # [phone]  Confirmation page shows OK result.  PP

## 2020-06-15 ENCOUNTER — ROUTINE PRENATAL (OUTPATIENT)
Dept: OBSTETRICS AND GYNECOLOGY | Facility: CLINIC | Age: 40
End: 2020-06-15

## 2020-06-15 ENCOUNTER — TELEPHONE (OUTPATIENT)
Dept: OBSTETRICS AND GYNECOLOGY | Facility: CLINIC | Age: 40
End: 2020-06-15

## 2020-06-15 VITALS — BODY MASS INDEX: 28.96 KG/M2 | SYSTOLIC BLOOD PRESSURE: 128 MMHG | WEIGHT: 174 LBS | DIASTOLIC BLOOD PRESSURE: 68 MMHG

## 2020-06-15 DIAGNOSIS — Z3A.20 20 WEEKS GESTATION OF PREGNANCY: Primary | ICD-10-CM

## 2020-06-15 PROBLEM — O10.919 CHRONIC HYPERTENSION AFFECTING PREGNANCY: Status: ACTIVE | Noted: 2020-06-15

## 2020-06-15 LAB
GLUCOSE UR STRIP-MCNC: NEGATIVE MG/DL
PROT UR STRIP-MCNC: NEGATIVE MG/DL

## 2020-06-15 PROCEDURE — 99212 OFFICE O/P EST SF 10 MIN: CPT | Performed by: OBSTETRICS & GYNECOLOGY

## 2020-06-15 RX ORDER — LABETALOL 100 MG/1
100 TABLET, FILM COATED ORAL DAILY
Qty: 30 TABLET | Refills: 3 | Status: SHIPPED | OUTPATIENT
Start: 2020-06-15 | End: 2021-02-26

## 2020-06-15 RX ORDER — PRENATAL 168/IRON/FOLIC/OMEGA3 27-800-235
1 CAPSULE ORAL DAILY
Qty: 90 CAPSULE | Refills: 1 | Status: SHIPPED | OUTPATIENT
Start: 2020-06-15 | End: 2020-09-15 | Stop reason: SDUPTHER

## 2020-06-15 RX ORDER — FAMOTIDINE 20 MG/1
20 TABLET, FILM COATED ORAL 2 TIMES DAILY
Qty: 60 TABLET | Refills: 2 | Status: SHIPPED | OUTPATIENT
Start: 2020-06-15 | End: 2020-06-16

## 2020-06-15 NOTE — TELEPHONE ENCOUNTER
[facility] calls - They can not get Pepcid 20mg - can only get the 40 mg tablets.    Do you want to order a different medication?

## 2020-06-16 RX ORDER — FAMOTIDINE 20 MG/1
40 TABLET, FILM COATED ORAL 2 TIMES DAILY PRN
Qty: 60 TABLET | Refills: 2 | Status: SHIPPED | OUTPATIENT
Start: 2020-06-16 | End: 2020-09-14

## 2020-06-16 NOTE — PROGRESS NOTES
at 20.1 IUP presents for follow up prenatal care visit. NO obstetrical complaints. Currently on baby asa 81 mg as well as Labetalol 100 mg   PE see above   A/P  at 20.1 IUP   RTC in 4 weeks   Miscarriage precautions discussed   Follow up anatomy sono

## 2020-07-23 ENCOUNTER — HOSPITAL ENCOUNTER (EMERGENCY)
Facility: HOSPITAL | Age: 40
Discharge: HOME | End: 2020-07-23
Admitting: EMERGENCY MEDICINE

## 2020-07-23 ENCOUNTER — HOSPITAL ENCOUNTER (EMERGENCY)
Facility: HOSPITAL | Age: 40
Discharge: LEFT BEFORE DISCHARGE | End: 2020-07-23

## 2020-07-23 VITALS
SYSTOLIC BLOOD PRESSURE: 122 MMHG | HEIGHT: 66 IN | WEIGHT: 184 LBS | TEMPERATURE: 98 F | DIASTOLIC BLOOD PRESSURE: 71 MMHG | RESPIRATION RATE: 16 BRPM | OXYGEN SATURATION: 99 % | BODY MASS INDEX: 29.57 KG/M2 | HEART RATE: 90 BPM

## 2020-07-23 DIAGNOSIS — K05.10 GINGIVITIS: ICD-10-CM

## 2020-07-23 DIAGNOSIS — K02.9 DENTAL CARIES: Primary | ICD-10-CM

## 2020-07-23 PROCEDURE — 99283 EMERGENCY DEPT VISIT LOW MDM: CPT

## 2020-07-23 PROCEDURE — 25010000002 PENICILLIN G BENZATHINE PER 1200000 UNITS: Performed by: PHYSICIAN ASSISTANT

## 2020-07-23 PROCEDURE — 96372 THER/PROPH/DIAG INJ SC/IM: CPT

## 2020-07-23 RX ORDER — MORPHINE SULFATE 2 MG/ML
2 INJECTION, SOLUTION INTRAMUSCULAR; INTRAVENOUS ONCE
Status: DISCONTINUED | OUTPATIENT
Start: 2020-07-23 | End: 2020-07-23

## 2020-07-23 RX ADMIN — PENICILLIN G BENZATHINE 2.4 MILLION UNITS: 1200000 INJECTION, SUSPENSION INTRAMUSCULAR at 21:22

## 2020-07-23 RX ADMIN — DIPHENHYDRAMINE HYDROCHLORIDE 15 ML: 12.5 SOLUTION ORAL at 19:58

## 2020-07-24 NOTE — DISCHARGE INSTRUCTIONS
It is very important that you contact your dentist to let them know of your continuing problems with your teeth.  They may be able to intervene prior to the birth to help minimize your symptoms.  Due to the fact that you are 27 weeks pregnant it is important that we are cautious of which medications we administer, this means that Tylenol is the only option for pain control.  This is another reason why it is very important to contact the dentist to see how they can further alleviate the root cause of your symptoms here today.  Please use topical pain relievers/numbing agents as advised by the dentist for further ability to control the pain.     Dental Caries, Adult    Dental caries are spots of decay (cavities) in the outer layer of your tooth (enamel). The natural bacteria in your mouth produce acid when breaking down sugary foods and drinks. When you eat or drink a lot of sugary foods and liquids, a lot of acid is produced. The acid destroys the protective enamel of your tooth, leading to tooth decay.  It is important to treat your tooth decay as soon as possible. Untreated dental caries can spread decay and lead to painful infection. Brushing regularly with fluoride toothpaste (oral hygiene) and getting regular dental checkups can help prevent dental caries.  What are the causes?  Dental caries are caused by the acid that is produced when bacteria break down sugary or acidic foods and drinks.  What increases the risk?  This condition is more likely to develop in young adults. This condition is also more likely to develop in people who:  · Drink a lot of sugary liquids, including alcoholic drinks, such as champagne.  · Eat a lot of sweets and carbohydrates.  · Drink water that is not treated with fluoride.  · Have poor oral hygiene.  · Have deep grooves in their teeth.  · Take certain medicines that decrease saliva.  What are the signs or symptoms?  Symptoms of dental caries include:  · White, brown, or black spots  on the teeth.  · Pain.  · Swollen or bleeding gums.  How is this diagnosed?  Your dentist may suspect dental caries from your signs and symptoms. The dentist will also do an oral exam. This may include X-rays to confirm the diagnosis. Sometimes lights, a thin probe, and dyes are used to find dental caries (using electrical conductivity or using laser reflection).  How is this treated?  Treatment for dental caries usually involves a procedure to remove the decay and restore the tooth with a filling or a sealant.  Follow these instructions at home:  · Practice good oral hygiene. This keeps your mouth and gums healthy. Use fluoride toothpaste to brush your teeth twice a day, and floss once a day.  · If your dentist prescribed an antibiotic medicine to treat an infection, take it as told by your dentist. Do not stop taking the antibiotic even if your condition improves.  · Keep all follow-up visits as told by your dentist. This is important. This includes all cleanings.  How is this prevented?  To prevent dental caries:  · Brush your teeth every morning and night with fluoride toothpaste.  · Get regular dental cleanings.  · If you are at risk of dental caries, wash your mouth with prescription mouthwash (chlorhexidine) and apply topical fluoride to your teeth.  · Drink fluoridated water regularly.  · Drink water instead of sugary drinks.  · Eat healthy meals and snacks.  Contact a health care provider if:  · You have symptoms of tooth decay.  This information is not intended to replace advice given to you by your health care provider. Make sure you discuss any questions you have with your health care provider.  Document Released: 09/09/2003 Document Revised: 11/30/2018 Document Reviewed: 06/30/2017  [employer] Patient Education © 2020 [employer]      Gingivitis  Gingivitis is inflammation of the gums. It can cause redness, soreness, bleeding, and swelling of the gums. This condition is usually mild and clears up with  treatment. However, without treatment and proper oral hygiene, gingivitis can get worse and lead to other problems with the teeth and gums.  What are the causes?  This condition is usually caused by a buildup of a sticky substance called plaque.  · Plaque is made up of mucus, bacteria, and food particles.  · When plaque builds up, it reacts with the saliva in the mouth to form a hard deposit called tartar, which becomes trapped around the base of the tooth.  · Plaque and tartar cause irritation of the gums that leads to gingivitis.  Gingivitis usually results from poor care and cleaning of the mouth and teeth (oral hygiene).  What increases the risk?  The following factors may make you more likely to develop this condition:  · Not practicing good oral hygiene.  · Eating a diet that does not provide proper nutrition.  · Taking certain medicines.  · Being pregnant.  · Going through puberty or menopause.  · Using tobacco products.  · Having certain medical conditions, such as:  ? Diabetes.  ? Some viral or fungal infections.  ? Dry mouth.  · Wearing dental appliances that do not fit properly.  What are the signs or symptoms?  Symptoms of this condition include:  · Gums that bleed easily, especially during flossing or brushing.  · Swollen gums.  · Gums that are bright red or purple.  · Receding gums. This means that the gums are wearing away from the teeth so that more of the tooth is exposed.  · Bad breath.  How is this diagnosed?  This condition is diagnosed with a medical history and a physical exam of the teeth and gums. You may have X-rays to see if the inflammation has spread to the supporting structures of the teeth.  How is this treated?  This condition may be treated by:  · Having your teeth and gums cleaned at the dentist's office to have the plaque and tartar removed.  · Practicing good oral hygiene at home. This includes careful brushing and regular flossing.  · Using a mouthwash. In some cases, a mouthwash  may be prescribed or recommended.  In more advanced cases, this condition may be treated with antibiotic medicine or surgery.  Follow these instructions at home:         · Follow instructions from your dentist about how to clean your teeth. Make sure you:  ? Brush your teeth two times a day using a soft-bristled toothbrush.  ? Floss at least one time each day. It is best to floss before you brush your teeth.  ? Use a mouthwash as told by your dentist.  · Maintain a well-balanced diet. Between meals, limit your intake of foods and beverages that contain sugar.  · See a dentist on a regular basis for cleaning and checkups.  · Do not use any products that contain nicotine or tobacco, such as cigarettes, e-cigarettes, and chewing tobacco. If you need help quitting, ask your health care provider.  · If you were prescribed an antibiotic medicine, take it as told by your dentist. Do not stop using the antibiotic even if you start to feel better.  · Keep all follow-up visits as told by your dentist. This is important.  Contact a health care provider if you:  · Have a fever.  · Have a lot of bleeding from your gums.  · Have pain in your gums or teeth.  · Have difficulty chewing.  · Notice any loose or infected teeth.  · Have swollen glands in your face or neck.  Summary  · Gingivitis is inflammation of the gums. It can cause redness, soreness, bleeding, and swelling of the gums. This condition is usually mild and clears up with treatment.  · Follow instructions from your dentist about how to clean your teeth.  · Use a mouthwash as told by your dentist.  · Contact a health care provider if you have new or worsening symptoms.  · Keep all follow-up visits as told by your dentist. This is important.  This information is not intended to replace advice given to you by your health care provider. Make sure you discuss any questions you have with your health care provider.  Document Released: 06/13/2002 Document Revised: 07/24/2019  Document Reviewed: 07/24/2019  [employer] Patient Education © 2020 [employer]

## 2020-07-24 NOTE — ED PROVIDER NOTES
"Subjective   History of Present Illness    Patient is a 39-year-old female presenting to ED with dental pain.  Patient is G4, P3.  Patient's LNMP is 1/28/2020 with an BETTY of 11/1/2020.  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  Patient reported that due to her former heroin as well as other drug use she has \"terrible teeth.\"  Patient reported that she is currently 27 weeks pregnant and noted that ever since she found out she was pregnant she has had problems with her teeth crumbling.  Patient reported for the past 3 days the pain has been excruciating and she feels pieces have broken off \"to the point I cannot handle it anymore.\"  Patient reported she has been working with [employer] and has been saving up money to have the procedure done to fix her teeth however they will not perform it until after her pregnancy.  Patient reported that she has not ate or drink in the past day due to significant pain and states \"I just need to feed my baby.\"  Patient is very tearful and constantly asking to hug caregivers in the room.  Patient reports she has been using significant amounts of Tylenol and states the last one was just prior to arrival however \"I ate through 15 of them today.\"    Patient adamantly denies any vaginal bleeding, vaginal discharge, lower abdominal cramping.  Patient reported she has felt baby boy move during this pregnancy and felt him move, just prior to the visit as well as while in the ED waiting room.  Patient denies any urinary symptoms including dysuria, hematuria, urgency, frequency, or hesitancy.    Patient has no known medication allergies.  Patient has a medical history positive for kidney infections, depression, anxiety, hepatitis C, bipolar disorder.  Patient has a surgical history positive for cystoscopy with ureteral stent placement, lithotripsy, appendectomy, tonsillectomy.  Patient is a current 1 pack/day cigarette smoker.  Patient " "reports former drug use but denies any current use of IV/recreational/illicit drugs or alcohol.    Records reviewed show patient was seen in ED on 11/16/2019 for polysubstance abuse and stress due to marital problems.  Patient was then seen 11/19/2019 for hallucinations and a laceration of the right upper extremity.  Patient was seen on 2/2/2020 for dental abscess and tobacco use.  At that time patient wanted narcotic medications or opiates and was not transparent about her Suboxone use.  Patient was given Percocet and clindamycin in the ED and sent home with clindamycin and ibuprofen.      Review of Systems   Constitutional: Negative.  Negative for chills, diaphoresis and fever.   HENT: Positive for dental problem. Negative for ear pain, facial swelling, sinus pressure, sore throat and trouble swallowing.    Eyes: Negative.    Respiratory: Negative.    Cardiovascular: Negative.    Gastrointestinal: Positive for nausea. Negative for abdominal pain (No abdominal cramping) and vomiting.   Genitourinary: Negative.  Negative for vaginal bleeding and vaginal discharge.        Reports + pregnancy (27 weeks)   Musculoskeletal: Negative.  Negative for neck pain and neck stiffness.   Skin: Negative.    Neurological: Positive for headaches (\"coming form my teeth\").   Psychiatric/Behavioral: The patient is nervous/anxious and is hyperactive.    All other systems reviewed and are negative.      Past Medical History:   Diagnosis Date   • Anxiety    • Bipolar affective disorder (CMS/HCC)    • Depression    • Hepatitis C    • Kidney infection        No Known Allergies    Past Surgical History:   Procedure Laterality Date   • APPENDECTOMY     • CYSTOSCOPY W/ URETERAL STENT PLACEMENT Left 10/6/2018    Procedure: CYSTOSCOPY LEFT URETERAL STENT INSERTION;  Surgeon: Tyrone Machuca MD;  Location: [facility];  Service: Urology   • TONSILLECTOMY     • URETEROSCOPY LASER LITHOTRIPSY WITH STENT INSERTION Left 10/17/2018    Procedure: " URETEROSCOPY LASER LITHOTRIPSY WITH STENT EXCHANGE STONE EXTRACTION;  Surgeon: Tyrone Machuca MD;  Location: [facility];  Service: Urology       History reviewed. No pertinent family history.    Social History     Socioeconomic History   • Marital status:      Spouse name: Not on file   • Number of children: Not on file   • Years of education: Not on file   • Highest education level: Not on file   Tobacco Use   • Smoking status: Current Every Day Smoker     Packs/day: 0.50     Years: 20.00     Pack years: 10.00     Types: Cigarettes   • Smokeless tobacco: Never Used   Substance and Sexual Activity   • Alcohol use: No   • Drug use: Yes     Types: Marijuana     Comment: sober for several years now.   • Sexual activity: Yes     Partners: Male     Birth control/protection: None           Objective   Physical Exam   Constitutional: She is oriented to person, place, and time. She appears well-developed and well-nourished. She appears distressed (due to anxiety; tearful, asking for hugs during entire examination).   HENT:   Head: Normocephalic and atraumatic.   Right Ear: External ear normal.   Left Ear: External ear normal.   Nose: Rhinorrhea present.   Mouth/Throat: Uvula is midline, oropharynx is clear and moist and mucous membranes are normal. No oral lesions. No trismus in the jaw. Abnormal dentition. Dental caries present. No dental abscesses or uvula swelling.   Very poor disease with dental caries throughout every remaining tooth.  All teeth have missing portions with most covered in a black decay.  Tooth #9 with evidence of recent fracture.  No surrounding gingival erythema.  Tooth #31 as well as tooth #29 similar in appearance.  No evidence of abnormalities to bugle mucosa, roof of mouth, or floor of mouth.  No evidence of Yuri angina.    No jaw motion tenderness, no malocclusion   Eyes: Pupils are equal, round, and reactive to light. Conjunctivae and EOM are normal.   Neck: Normal range of motion.  "Neck supple.   Cardiovascular: Normal rate, regular rhythm, normal heart sounds and intact distal pulses.   No tachycardia   Pulmonary/Chest: Effort normal and breath sounds normal.   Abdominal: Bowel sounds are normal. There is no tenderness.   Gravid abdomen consistent with dates   Musculoskeletal: Normal range of motion. She exhibits no edema.   Lymphadenopathy:        Head (right side): No submandibular, no tonsillar, no preauricular, no posterior auricular and no occipital adenopathy present.        Head (left side): No submandibular, no tonsillar, no preauricular, no posterior auricular and no occipital adenopathy present.     She has no cervical adenopathy.        Right: No supraclavicular adenopathy present.        Left: No supraclavicular adenopathy present.   Neurological: She is alert and oriented to person, place, and time.   Skin: Skin is warm and dry. Capillary refill takes less than 2 seconds. She is not diaphoretic.   Psychiatric: Her mood appears anxious. Her affect is angry. Her speech is rapid and/or pressured. She is agitated, aggressive and hyperactive.   Nursing note and vitals reviewed.      Procedures           ED Course  ED Course as of Jul 24 1653   Thu Jul 23, 2020 2014 Discussed case with Dr. Bay Jeffery at this time who agrees that due to patient gestational age tylenol is the only pain medication appropriate as anxiolytics and anti-pain medications are not safe.     [JS]   2026 Upon reevaluation at this time patient has increased tearfulness and agitation.  Patient reported the topical miracle mouthwash only slightly worked and there is one right lower tooth that it did not affect.  Discussed with patient that we can soak it in a cottonball in place that there to help with the efficacy.  Patient is very upset at this time stating \"I feel like you are treating me like an addict because my  is an addict.\"  Discussed with patient that I know none of the medical history for her " "significant other and I am thinking of both herself as well as her fetus when we consider which medications are safe to administer.  Discussed with patient that Tylenol is safe and she asks for dose here, as patient to clarify the last time she took a dose and she states \"I 42 had 15 of them today so reminded to just eat more of them?\"  Patient becomes very upset stating that her 8 mg of Subutex are \"not a pain reliever and those are just helping me clear my addiction, I feel like you are treating me like an addict because I am on that and you will give me anything.  I know lots of girls who have tooth pain and pregnancy and they get Lortab, Suboxone, and even more Subutex.\"  Discussed with patient risk, benefits, and alternatives to medications in pregnancy and the possible harm it can cause to her fetus.  Patient requests that I \"go look up if things are safe like tramadol or Klonopin or Toradol.\"  Discussed with patient that Toradol is harmful to the fetus is growing renal system.  Discussed with patient that Klonopin and tramadol are also medications as previously mentioned that are not safe.  Offered patient to speak with my attending to reiterate the safety concerns with these medications. Patient replies \"I do not want him in here because I will get frustrated and throw this cup.\" Patient continues to demand \"stop treating me like an addict and go look up tramadol and klonipin.\"    [JS]   4167 Additional discussion at bedside with chaperone present, Marty MORALES.  Discussed with patient that all of these additional medications are as well not safe in pregnancy.  Patient stated \"I was just trying to test you because I am getting Klonopin now and I wanted to see how you would react.\"  Patient repeats numerous times \"I should not told you I was pregnant so you would just give me medicines.  I have not been here waiting 2 years and I do not know what you are looking for in my charts but I am not an addict and that is " "how you are treating me.\"  Reassured patient numerous times that I do not feel she is an addict however I need to keep her growing fetus safe.  Patient stated \"all my OB/GYN provider gives me all the Klonopin I need and when I had kidney stones they gave me Dilaudid.\"  Discussed with patient that these are the emergency situations we spoke before previously where we safe medications to use as hopefully they are few and far between and we need to minimize risks to the fetus.  Patient very upset continuing to ruminate \"you have wasted my time and why would I come here if you are just going to treatment like an addict.\" Patient now changing her mind asking for tylenol to \"do something.\" Discussed with patient that she needs to call her her OB/GYN provider in the morning to discuss medications that they feel safe as the OB/GYN providers may have a lower threshold for treatment.  Offered patient once again to speak with my attending and she stated \"I apparently know more about medication and do more research than you do so talking to him will do anything.\"    [JS]      ED Course User Index  [JS] Derrell Escalera PA-C                                           MDM  Number of Diagnoses or Management Options  Dental caries:   Gingivitis:      Amount and/or Complexity of Data Reviewed  Tests in the medicine section of CPT®: ordered and reviewed  Decide to obtain previous medical records or to obtain history from someone other than the patient: yes  Review and summarize past medical records: yes  Discuss the patient with other providers: yes (Dr. Jeffery (attending))    Patient Progress  Patient progress: stable      Final diagnoses:   Dental caries   Gingivitis            Derrell Escalera PA-C  07/24/20 7205    "

## 2020-07-29 ENCOUNTER — ROUTINE PRENATAL (OUTPATIENT)
Dept: OBSTETRICS AND GYNECOLOGY | Facility: CLINIC | Age: 40
End: 2020-07-29

## 2020-07-29 VITALS — DIASTOLIC BLOOD PRESSURE: 74 MMHG | WEIGHT: 182 LBS | SYSTOLIC BLOOD PRESSURE: 126 MMHG | BODY MASS INDEX: 29.38 KG/M2

## 2020-07-29 DIAGNOSIS — Z3A.26 26 WEEKS GESTATION OF PREGNANCY: Primary | ICD-10-CM

## 2020-07-29 PROCEDURE — 99212 OFFICE O/P EST SF 10 MIN: CPT | Performed by: OBSTETRICS & GYNECOLOGY

## 2020-07-29 NOTE — PROGRESS NOTES
at 26.3 IUP presents for follow up prenatal care visit. No obstetrical complaints.   PE see above   A?P  at 26.3 IUP   RTC In 2 weeks   GCT and hemoglobin at that time.   PTL Precautions discussed   Patient desires tubal ligation will sign paper in 4 weeks.

## 2020-08-03 ENCOUNTER — TELEPHONE (OUTPATIENT)
Dept: OBSTETRICS AND GYNECOLOGY | Facility: CLINIC | Age: 40
End: 2020-08-03

## 2020-08-03 NOTE — TELEPHONE ENCOUNTER
Pt 27 w pregnant called with c/o baby kicking her in the same spot. Told pt that when that happens, try and lay on left side, drink something sugary to get baby to move from that area. Pt says she did drink some coffee with sugar and got baby to move around. BS

## 2020-08-12 ENCOUNTER — ROUTINE PRENATAL (OUTPATIENT)
Dept: OBSTETRICS AND GYNECOLOGY | Facility: CLINIC | Age: 40
End: 2020-08-12

## 2020-08-12 VITALS — WEIGHT: 183 LBS | SYSTOLIC BLOOD PRESSURE: 128 MMHG | DIASTOLIC BLOOD PRESSURE: 72 MMHG | BODY MASS INDEX: 29.54 KG/M2

## 2020-08-12 DIAGNOSIS — Z3A.28 28 WEEKS GESTATION OF PREGNANCY: Primary | ICD-10-CM

## 2020-08-12 LAB
GLUCOSE UR STRIP-MCNC: NEGATIVE MG/DL
PROT UR STRIP-MCNC: ABNORMAL MG/DL

## 2020-08-12 PROCEDURE — 90715 TDAP VACCINE 7 YRS/> IM: CPT | Performed by: OBSTETRICS & GYNECOLOGY

## 2020-08-12 PROCEDURE — 99212 OFFICE O/P EST SF 10 MIN: CPT | Performed by: OBSTETRICS & GYNECOLOGY

## 2020-08-12 PROCEDURE — 90471 IMMUNIZATION ADMIN: CPT | Performed by: OBSTETRICS & GYNECOLOGY

## 2020-08-12 NOTE — PROGRESS NOTES
at 28.3 IUP presents for follow up prenatal care visit. No obstetrical complaints. Denies headache, blurry vision or RUQ Pain.   PE see above   A/P  at 28.3 IUP   Will have patient collect 24 hour urine secondary to history of preeclampsia in last pregnancy   RTC In 2 weeks   4d   PTL precautions

## 2020-08-13 LAB
GLUCOSE 1H P 50 G GLC PO SERPL-MCNC: 127 MG/DL (ref 65–139)
HGB BLD-MCNC: 11 G/DL (ref 11.1–15.9)

## 2020-08-28 ENCOUNTER — ROUTINE PRENATAL (OUTPATIENT)
Dept: OBSTETRICS AND GYNECOLOGY | Facility: CLINIC | Age: 40
End: 2020-08-28

## 2020-08-28 VITALS — SYSTOLIC BLOOD PRESSURE: 126 MMHG | DIASTOLIC BLOOD PRESSURE: 70 MMHG | WEIGHT: 185 LBS | BODY MASS INDEX: 29.86 KG/M2

## 2020-08-28 DIAGNOSIS — Z3A.30 30 WEEKS GESTATION OF PREGNANCY: Primary | ICD-10-CM

## 2020-08-28 LAB
GLUCOSE UR STRIP-MCNC: NEGATIVE MG/DL
PROT UR STRIP-MCNC: ABNORMAL MG/DL

## 2020-08-28 PROCEDURE — 99212 OFFICE O/P EST SF 10 MIN: CPT | Performed by: OBSTETRICS & GYNECOLOGY

## 2020-08-28 NOTE — PROGRESS NOTES
39 year old female  at 30.5 IUP presents for follow up prenatal care. No obstetrical complaints at this time.   PE see above   A/P  at 30.5 IUP   RTC in 2 weeks   PTL precautions discussed.

## 2020-09-02 ENCOUNTER — HOSPITAL ENCOUNTER (OUTPATIENT)
Facility: HOSPITAL | Age: 40
Setting detail: OBSERVATION
Discharge: HOME | End: 2020-09-03
Attending: OBSTETRICS & GYNECOLOGY | Admitting: OBSTETRICS & GYNECOLOGY

## 2020-09-02 ENCOUNTER — APPOINTMENT (OUTPATIENT)
Dept: ULTRASOUND IMAGING | Facility: HOSPITAL | Age: 40
End: 2020-09-02

## 2020-09-02 VITALS — DIASTOLIC BLOOD PRESSURE: 80 MMHG | TEMPERATURE: 97 F | SYSTOLIC BLOOD PRESSURE: 128 MMHG | HEART RATE: 106 BPM

## 2020-09-02 PROBLEM — Z34.93 THIRD TRIMESTER PREGNANCY: Status: ACTIVE | Noted: 2020-09-02

## 2020-09-02 PROCEDURE — 85460 HEMOGLOBIN FETAL: CPT | Performed by: OBSTETRICS & GYNECOLOGY

## 2020-09-02 PROCEDURE — 86850 RBC ANTIBODY SCREEN: CPT | Performed by: OBSTETRICS & GYNECOLOGY

## 2020-09-02 PROCEDURE — 85027 COMPLETE CBC AUTOMATED: CPT | Performed by: OBSTETRICS & GYNECOLOGY

## 2020-09-02 PROCEDURE — G0378 HOSPITAL OBSERVATION PER HR: HCPCS

## 2020-09-02 PROCEDURE — 86900 BLOOD TYPING SEROLOGIC ABO: CPT | Performed by: OBSTETRICS & GYNECOLOGY

## 2020-09-02 PROCEDURE — 76815 OB US LIMITED FETUS(S): CPT

## 2020-09-02 PROCEDURE — 86901 BLOOD TYPING SEROLOGIC RH(D): CPT | Performed by: OBSTETRICS & GYNECOLOGY

## 2020-09-02 RX ORDER — SODIUM CHLORIDE 0.9 % (FLUSH) 0.9 %
10 SYRINGE (ML) INJECTION EVERY 12 HOURS SCHEDULED
Status: DISCONTINUED | OUTPATIENT
Start: 2020-09-03 | End: 2020-09-02

## 2020-09-02 RX ORDER — SODIUM CHLORIDE 0.9 % (FLUSH) 0.9 %
10 SYRINGE (ML) INJECTION AS NEEDED
Status: DISCONTINUED | OUTPATIENT
Start: 2020-09-02 | End: 2020-09-02

## 2020-09-03 LAB
ABO GROUP BLD: NORMAL
APTT PPP: 28.5 SECONDS (ref 24.1–35)
BLD GP AB SCN SERPL QL: NEGATIVE
D DIMER PPP FEU-MCNC: 0.6 MG/L (FEU) (ref 0–0.5)
DEPRECATED RDW RBC AUTO: 39.6 FL (ref 37–54)
ERYTHROCYTE [DISTWIDTH] IN BLOOD BY AUTOMATED COUNT: 12.8 % (ref 12.3–15.4)
FIBRINOGEN PPP-MCNC: 386 MG/DL (ref 240–460)
FSP PPP LA-ACNC: NORMAL
HCT VFR BLD AUTO: 29.6 % (ref 34–46.6)
HGB BLD-MCNC: 10.2 G/DL (ref 12–15.9)
HGB F BLD QL KLEIH BETKE: NORMAL
INR PPP: 1.01 (ref 0.91–1.09)
MCH RBC QN AUTO: 29.8 PG (ref 26.6–33)
MCHC RBC AUTO-ENTMCNC: 34.5 G/DL (ref 31.5–35.7)
MCV RBC AUTO: 86.5 FL (ref 79–97)
PLATELET # BLD AUTO: 130 10*3/MM3 (ref 140–450)
PMV BLD AUTO: 10.6 FL (ref 6–12)
PROTHROMBIN TIME: 12.9 SECONDS (ref 11.9–14.6)
RBC # BLD AUTO: 3.42 10*6/MM3 (ref 3.77–5.28)
RH BLD: POSITIVE
T&S EXPIRATION DATE: NORMAL
WBC # BLD AUTO: 12.28 10*3/MM3 (ref 3.4–10.8)

## 2020-09-03 PROCEDURE — 85730 THROMBOPLASTIN TIME PARTIAL: CPT | Performed by: OBSTETRICS & GYNECOLOGY

## 2020-09-03 PROCEDURE — 85384 FIBRINOGEN ACTIVITY: CPT | Performed by: OBSTETRICS & GYNECOLOGY

## 2020-09-03 PROCEDURE — 85610 PROTHROMBIN TIME: CPT | Performed by: OBSTETRICS & GYNECOLOGY

## 2020-09-03 PROCEDURE — 85362 FIBRIN DEGRADATION PRODUCTS: CPT | Performed by: OBSTETRICS & GYNECOLOGY

## 2020-09-03 PROCEDURE — 85379 FIBRIN DEGRADATION QUANT: CPT | Performed by: OBSTETRICS & GYNECOLOGY

## 2020-09-03 NOTE — NURSING NOTE
"Patient advised Dr Hyatt wants her to stay for fetal monitoring for 4 hours and to await labs.  Patient refuses and states\" her mommy instincts are telling her that her baby is okay\"  Patient signed AMA form and is leaving against medical advise. Cordelia Soto RN     "

## 2020-09-14 RX ORDER — PRENATAL WITH FERROUS FUM AND FOLIC ACID 3080; 920; 120; 400; 22; 1.84; 3; 20; 10; 1; 12; 200; 27; 25; 2 [IU]/1; [IU]/1; MG/1; [IU]/1; MG/1; MG/1; MG/1; MG/1; MG/1; MG/1; UG/1; MG/1; MG/1; MG/1; MG/1
TABLET ORAL
Qty: 90 TABLET | Refills: 1 | Status: SHIPPED | OUTPATIENT
Start: 2020-09-14 | End: 2020-10-13 | Stop reason: SDUPTHER

## 2020-09-14 RX ORDER — FAMOTIDINE 40 MG/1
TABLET, FILM COATED ORAL
Qty: 30 TABLET | Refills: 2 | Status: SHIPPED | OUTPATIENT
Start: 2020-09-14 | End: 2020-09-15 | Stop reason: SDUPTHER

## 2020-09-15 ENCOUNTER — ROUTINE PRENATAL (OUTPATIENT)
Dept: OBSTETRICS AND GYNECOLOGY | Facility: CLINIC | Age: 40
End: 2020-09-15

## 2020-09-15 VITALS — BODY MASS INDEX: 30.18 KG/M2 | DIASTOLIC BLOOD PRESSURE: 70 MMHG | SYSTOLIC BLOOD PRESSURE: 102 MMHG | WEIGHT: 187 LBS

## 2020-09-15 DIAGNOSIS — N89.8 VAGINAL DISCHARGE DURING PREGNANCY IN THIRD TRIMESTER: Primary | ICD-10-CM

## 2020-09-15 DIAGNOSIS — O10.919 CHRONIC HYPERTENSION AFFECTING PREGNANCY: ICD-10-CM

## 2020-09-15 DIAGNOSIS — O26.893 VAGINAL DISCHARGE DURING PREGNANCY IN THIRD TRIMESTER: Primary | ICD-10-CM

## 2020-09-15 LAB
GLUCOSE UR STRIP-MCNC: NEGATIVE MG/DL
PROT UR STRIP-MCNC: ABNORMAL MG/DL

## 2020-09-15 PROCEDURE — 87491 CHLMYD TRACH DNA AMP PROBE: CPT | Performed by: OBSTETRICS & GYNECOLOGY

## 2020-09-15 PROCEDURE — 87481 CANDIDA DNA AMP PROBE: CPT | Performed by: OBSTETRICS & GYNECOLOGY

## 2020-09-15 PROCEDURE — 87563 M. GENITALIUM AMP PROBE: CPT | Performed by: OBSTETRICS & GYNECOLOGY

## 2020-09-15 PROCEDURE — 99212 OFFICE O/P EST SF 10 MIN: CPT | Performed by: OBSTETRICS & GYNECOLOGY

## 2020-09-15 PROCEDURE — 87591 N.GONORRHOEAE DNA AMP PROB: CPT | Performed by: OBSTETRICS & GYNECOLOGY

## 2020-09-15 PROCEDURE — 87512 GARDNER VAG DNA QUANT: CPT | Performed by: OBSTETRICS & GYNECOLOGY

## 2020-09-15 PROCEDURE — 87798 DETECT AGENT NOS DNA AMP: CPT | Performed by: OBSTETRICS & GYNECOLOGY

## 2020-09-15 PROCEDURE — 87661 TRICHOMONAS VAGINALIS AMPLIF: CPT | Performed by: OBSTETRICS & GYNECOLOGY

## 2020-09-15 RX ORDER — VALACYCLOVIR HYDROCHLORIDE 1 G/1
500 TABLET, FILM COATED ORAL 2 TIMES DAILY
Qty: 30 TABLET | Refills: 1 | Status: SHIPPED | OUTPATIENT
Start: 2020-09-15

## 2020-09-15 RX ORDER — PRENATAL 168/IRON/FOLIC/OMEGA3 27-800-235
1 CAPSULE ORAL DAILY
Qty: 90 CAPSULE | Refills: 1 | Status: SHIPPED | OUTPATIENT
Start: 2020-09-15 | End: 2021-02-26

## 2020-09-15 RX ORDER — FAMOTIDINE 40 MG/1
20 TABLET, FILM COATED ORAL NIGHTLY PRN
Qty: 60 TABLET | Refills: 2 | Status: SHIPPED | OUTPATIENT
Start: 2020-09-15 | End: 2021-02-26

## 2020-09-15 NOTE — PROGRESS NOTES
39 year old female  at 33.2 IUP presents for follow up. No obstetrical complaints.   PE see above   A/P  CHTN on meds   MFM recommended twice weekly BPP   RTC on Friday for follow up BBP   PTL Precautions discussed   Discussed with patient possibility of NICU admission due to withdrawal at this time.

## 2020-09-16 ENCOUNTER — TELEPHONE (OUTPATIENT)
Dept: OBSTETRICS AND GYNECOLOGY | Facility: CLINIC | Age: 40
End: 2020-09-16

## 2020-09-16 NOTE — TELEPHONE ENCOUNTER
OB at 33wks 3 days calls stating her insurance wont cover the new rx for prenatals and is requesting the old rx instead. Spoke with pt's pharmacy and switched prenatal rx's in order for insurance to cover it. Pt also c/o burning when urinating and was under the impression she turned in a urine for culture yesterday at her OV. Explained to pt when urine specimen was dipped, it was not sent for culture because there was not a reason to based on results. After speaking with Dr. Hyatt, pt may come in to drop of urine specimen that we can send for culture. Pt is also pending STD panel results, which may be her urination problem. Pt voices understanding.

## 2020-09-17 LAB
C TRACH RRNA CVX QL NAA+PROBE: NOT DETECTED
N GONORRHOEA RRNA SPEC QL NAA+PROBE: NOT DETECTED

## 2020-09-18 LAB
LAB AP CASE REPORT: NORMAL
Lab: NORMAL
TRICHOMONAS VAGINALIS PCR: DETECTED

## 2020-09-21 ENCOUNTER — ROUTINE PRENATAL (OUTPATIENT)
Dept: OBSTETRICS AND GYNECOLOGY | Facility: CLINIC | Age: 40
End: 2020-09-21

## 2020-09-21 VITALS — SYSTOLIC BLOOD PRESSURE: 120 MMHG | DIASTOLIC BLOOD PRESSURE: 68 MMHG | WEIGHT: 190 LBS | BODY MASS INDEX: 30.67 KG/M2

## 2020-09-21 DIAGNOSIS — O10.919 CHRONIC HYPERTENSION AFFECTING PREGNANCY: Primary | ICD-10-CM

## 2020-09-21 PROCEDURE — 99212 OFFICE O/P EST SF 10 MIN: CPT | Performed by: OBSTETRICS & GYNECOLOGY

## 2020-09-21 RX ORDER — METRONIDAZOLE 500 MG/1
500 TABLET ORAL ONCE
Qty: 4 TABLET | Refills: 0 | Status: SHIPPED | OUTPATIENT
Start: 2020-09-21 | End: 2020-09-21

## 2020-09-21 NOTE — PROGRESS NOTES
at 34.1 IUP presents for follow up. No obstetrical complaints. Recently tested positive for trichomonas.   PE see above   A/P  at 34.1 IUP   RTC on Friday   PTL precautions discussed

## 2020-09-29 ENCOUNTER — TELEPHONE (OUTPATIENT)
Dept: OBSTETRICS AND GYNECOLOGY | Facility: CLINIC | Age: 40
End: 2020-09-29

## 2020-10-08 ENCOUNTER — ROUTINE PRENATAL (OUTPATIENT)
Dept: OBSTETRICS AND GYNECOLOGY | Facility: CLINIC | Age: 40
End: 2020-10-08

## 2020-10-08 VITALS — DIASTOLIC BLOOD PRESSURE: 70 MMHG | SYSTOLIC BLOOD PRESSURE: 130 MMHG | WEIGHT: 191 LBS | BODY MASS INDEX: 30.83 KG/M2

## 2020-10-08 DIAGNOSIS — B18.2 CHRONIC HEPATITIS C COMPLICATING PREGNANCY, ANTEPARTUM (HCC): ICD-10-CM

## 2020-10-08 DIAGNOSIS — O98.419 CHRONIC HEPATITIS C COMPLICATING PREGNANCY, ANTEPARTUM (HCC): ICD-10-CM

## 2020-10-08 DIAGNOSIS — Z3A.36 36 WEEKS GESTATION OF PREGNANCY: Primary | ICD-10-CM

## 2020-10-08 LAB
GLUCOSE UR STRIP-MCNC: NEGATIVE MG/DL
PROT UR STRIP-MCNC: NEGATIVE MG/DL

## 2020-10-08 PROCEDURE — 99212 OFFICE O/P EST SF 10 MIN: CPT | Performed by: OBSTETRICS & GYNECOLOGY

## 2020-10-08 NOTE — PROGRESS NOTES
at 36.4 IUP presents for follow up prenatal care visit. Denies headache, blurry vision. Denies obstetrical complaints.   PE see above   A/P  at 36.4 IUP CHTN on meds   RTC on Tuesday for follow up   PTL and preeclamptic precautions

## 2020-10-10 LAB — GP B STREP DNA SPEC QL NAA+PROBE: NEGATIVE

## 2020-10-12 LAB
A VAGINAE DNA VAG QL NAA+PROBE: ABNORMAL SCORE
BVAB2 DNA VAG QL NAA+PROBE: ABNORMAL SCORE
C ALBICANS DNA VAG QL NAA+PROBE: NEGATIVE
C GLABRATA DNA VAG QL NAA+PROBE: POSITIVE
C TRACH DNA VAG QL NAA+PROBE: NEGATIVE
MEGA1 DNA VAG QL NAA+PROBE: ABNORMAL SCORE
N GONORRHOEA DNA VAG QL NAA+PROBE: NEGATIVE
T VAGINALIS DNA VAG QL NAA+PROBE: POSITIVE

## 2020-10-13 ENCOUNTER — ROUTINE PRENATAL (OUTPATIENT)
Dept: OBSTETRICS AND GYNECOLOGY | Facility: CLINIC | Age: 40
End: 2020-10-13

## 2020-10-13 VITALS — BODY MASS INDEX: 30.83 KG/M2 | WEIGHT: 191 LBS | SYSTOLIC BLOOD PRESSURE: 130 MMHG | DIASTOLIC BLOOD PRESSURE: 74 MMHG

## 2020-10-13 DIAGNOSIS — O10.919 CHRONIC HYPERTENSION AFFECTING PREGNANCY: ICD-10-CM

## 2020-10-13 DIAGNOSIS — B18.2 CHRONIC HEPATITIS C COMPLICATING PREGNANCY, ANTEPARTUM (HCC): ICD-10-CM

## 2020-10-13 DIAGNOSIS — B18.2 CHRONIC HEPATITIS C WITHOUT HEPATIC COMA (HCC): Primary | ICD-10-CM

## 2020-10-13 DIAGNOSIS — Z3A.37 37 WEEKS GESTATION OF PREGNANCY: ICD-10-CM

## 2020-10-13 DIAGNOSIS — O98.419 CHRONIC HEPATITIS C COMPLICATING PREGNANCY, ANTEPARTUM (HCC): ICD-10-CM

## 2020-10-13 LAB
GLUCOSE UR STRIP-MCNC: NEGATIVE MG/DL
PROT UR STRIP-MCNC: NEGATIVE MG/DL

## 2020-10-13 PROCEDURE — 99212 OFFICE O/P EST SF 10 MIN: CPT | Performed by: OBSTETRICS & GYNECOLOGY

## 2020-10-13 RX ORDER — METRONIDAZOLE 500 MG/1
2000 TABLET ORAL ONCE
Qty: 4 TABLET | Refills: 0 | Status: SHIPPED | OUTPATIENT
Start: 2020-10-13 | End: 2020-10-13

## 2020-10-13 NOTE — PROGRESS NOTES
at 37.2 IUP presents for follow up prenatal care visit. Patient denies headache, blurry vision or RUQ Pain. She is currently on labetalol 100 mg bid. Her most recent cultures showed trichomonas.   PE   SVE: 1-2cm   A/P  at 37.2 IUP CHTN on meds   RTC on Friday   Labs ordered today   Preeclamptic and labor precautions discussed.

## 2020-10-15 LAB
ALBUMIN SERPL-MCNC: 3.3 G/DL (ref 3.8–4.8)
ALBUMIN/GLOB SERPL: 1.3 {RATIO} (ref 1.2–2.2)
ALP SERPL-CCNC: 171 IU/L (ref 39–117)
ALT SERPL-CCNC: 18 IU/L (ref 0–32)
AST SERPL-CCNC: 25 IU/L (ref 0–40)
BASOPHILS # BLD AUTO: 0 X10E3/UL (ref 0–0.2)
BASOPHILS NFR BLD AUTO: 0 %
BILIRUB SERPL-MCNC: 0.4 MG/DL (ref 0–1.2)
BUN SERPL-MCNC: 6 MG/DL (ref 6–20)
BUN/CREAT SERPL: 8 (ref 9–23)
CALCIUM SERPL-MCNC: 8.9 MG/DL (ref 8.7–10.2)
CHLORIDE SERPL-SCNC: 106 MMOL/L (ref 96–106)
CO2 SERPL-SCNC: 21 MMOL/L (ref 20–29)
CREAT SERPL-MCNC: 0.72 MG/DL (ref 0.57–1)
EOSINOPHIL # BLD AUTO: 0.1 X10E3/UL (ref 0–0.4)
EOSINOPHIL NFR BLD AUTO: 1 %
ERYTHROCYTE [DISTWIDTH] IN BLOOD BY AUTOMATED COUNT: 13 % (ref 11.7–15.4)
GLOBULIN SER CALC-MCNC: 2.5 G/DL (ref 1.5–4.5)
GLUCOSE SERPL-MCNC: 111 MG/DL (ref 65–99)
HCT VFR BLD AUTO: 33.7 % (ref 34–46.6)
HCV RNA SERPL NAA+PROBE-ACNC: NORMAL IU/ML
HCV RNA SERPL NAA+PROBE-LOG IU: 6.15 LOG10 IU/ML
HGB BLD-MCNC: 11.3 G/DL (ref 11.1–15.9)
IMM GRANULOCYTES # BLD AUTO: 0 X10E3/UL (ref 0–0.1)
IMM GRANULOCYTES NFR BLD AUTO: 0 %
LYMPHOCYTES # BLD AUTO: 2.4 X10E3/UL (ref 0.7–3.1)
LYMPHOCYTES NFR BLD AUTO: 25 %
MCH RBC QN AUTO: 29.6 PG (ref 26.6–33)
MCHC RBC AUTO-ENTMCNC: 33.5 G/DL (ref 31.5–35.7)
MCV RBC AUTO: 88 FL (ref 79–97)
MONOCYTES # BLD AUTO: 0.5 X10E3/UL (ref 0.1–0.9)
MONOCYTES NFR BLD AUTO: 6 %
NEUTROPHILS # BLD AUTO: 6.2 X10E3/UL (ref 1.4–7)
NEUTROPHILS NFR BLD AUTO: 68 %
PLATELET # BLD AUTO: 108 X10E3/UL (ref 150–450)
POTASSIUM SERPL-SCNC: 4 MMOL/L (ref 3.5–5.2)
PROT SERPL-MCNC: 5.8 G/DL (ref 6–8.5)
RBC # BLD AUTO: 3.82 X10E6/UL (ref 3.77–5.28)
SODIUM SERPL-SCNC: 138 MMOL/L (ref 134–144)
TEST INFORMATION: NORMAL
WBC # BLD AUTO: 9.2 X10E3/UL (ref 3.4–10.8)

## 2020-10-16 ENCOUNTER — TELEPHONE (OUTPATIENT)
Dept: OBSTETRICS AND GYNECOLOGY | Facility: CLINIC | Age: 40
End: 2020-10-16

## 2020-10-16 NOTE — TELEPHONE ENCOUNTER
Pt states taking a Klonopin and has been watching TV. Pt sounds less stressed and states she is doing much better. Appt 10/19/2020 at 1300.

## 2020-10-16 NOTE — TELEPHONE ENCOUNTER
"Called OB at 37.5 to check on pt from seeming very stressed when calling to state she couldn't make appt today. Pt is crying and states, \"It's just one thing after another. The truck broke down and the toilet was broken but we got that fixed. We were supposed to talk about being induced today and now I can't come until Monday so I'm just stressed. I think I'll take a Klonopin and watch a move to calm down. Will you call me at the end of the day to check on me?\" Instructed pt I would do that.   "

## 2020-10-19 ENCOUNTER — PREP FOR SURGERY (OUTPATIENT)
Dept: OTHER | Facility: HOSPITAL | Age: 40
End: 2020-10-19

## 2020-10-19 ENCOUNTER — ROUTINE PRENATAL (OUTPATIENT)
Dept: OBSTETRICS AND GYNECOLOGY | Facility: CLINIC | Age: 40
End: 2020-10-19

## 2020-10-19 VITALS — DIASTOLIC BLOOD PRESSURE: 78 MMHG | WEIGHT: 195 LBS | BODY MASS INDEX: 31.47 KG/M2 | SYSTOLIC BLOOD PRESSURE: 126 MMHG

## 2020-10-19 DIAGNOSIS — B18.2 CHRONIC HEPATITIS C WITHOUT HEPATIC COMA (HCC): Primary | ICD-10-CM

## 2020-10-19 DIAGNOSIS — O10.919 CHRONIC HYPERTENSION AFFECTING PREGNANCY: ICD-10-CM

## 2020-10-19 DIAGNOSIS — O10.919 CHRONIC HYPERTENSION AFFECTING PREGNANCY: Primary | ICD-10-CM

## 2020-10-19 LAB
GLUCOSE UR STRIP-MCNC: NEGATIVE MG/DL
PROT UR STRIP-MCNC: NEGATIVE MG/DL

## 2020-10-19 PROCEDURE — 99212 OFFICE O/P EST SF 10 MIN: CPT | Performed by: OBSTETRICS & GYNECOLOGY

## 2020-10-19 RX ORDER — OXYCODONE AND ACETAMINOPHEN 7.5; 325 MG/1; MG/1
2 TABLET ORAL EVERY 4 HOURS PRN
Status: CANCELLED | OUTPATIENT
Start: 2020-10-19 | End: 2020-10-29

## 2020-10-19 RX ORDER — OXYTOCIN/0.9 % SODIUM CHLORIDE 30/500 ML
250 PLASTIC BAG, INJECTION (ML) INTRAVENOUS CONTINUOUS
Status: CANCELLED | OUTPATIENT
Start: 2020-10-19 | End: 2020-10-19

## 2020-10-19 RX ORDER — ACETAMINOPHEN 650 MG/1
650 SUPPOSITORY RECTAL EVERY 4 HOURS PRN
Status: CANCELLED | OUTPATIENT
Start: 2020-10-19

## 2020-10-19 RX ORDER — PROMETHAZINE HYDROCHLORIDE 25 MG/1
12.5 TABLET ORAL EVERY 6 HOURS PRN
Status: CANCELLED | OUTPATIENT
Start: 2020-10-19

## 2020-10-19 RX ORDER — FAMOTIDINE 10 MG/ML
20 INJECTION, SOLUTION INTRAVENOUS EVERY 12 HOURS SCHEDULED
Status: CANCELLED | OUTPATIENT
Start: 2020-10-19

## 2020-10-19 RX ORDER — SODIUM CHLORIDE 0.9 % (FLUSH) 0.9 %
3 SYRINGE (ML) INJECTION EVERY 12 HOURS SCHEDULED
Status: CANCELLED | OUTPATIENT
Start: 2020-10-19

## 2020-10-19 RX ORDER — TERBUTALINE SULFATE 1 MG/ML
0.25 INJECTION, SOLUTION SUBCUTANEOUS AS NEEDED
Status: CANCELLED | OUTPATIENT
Start: 2020-10-19

## 2020-10-19 RX ORDER — ONDANSETRON 4 MG/1
4 TABLET, FILM COATED ORAL EVERY 6 HOURS PRN
Status: CANCELLED | OUTPATIENT
Start: 2020-10-19

## 2020-10-19 RX ORDER — PROMETHAZINE HYDROCHLORIDE 12.5 MG/1
12.5 SUPPOSITORY RECTAL EVERY 6 HOURS PRN
Status: CANCELLED | OUTPATIENT
Start: 2020-10-19

## 2020-10-19 RX ORDER — FAMOTIDINE 20 MG/1
20 TABLET, FILM COATED ORAL ONCE AS NEEDED
Status: CANCELLED | OUTPATIENT
Start: 2020-10-19

## 2020-10-19 RX ORDER — MISOPROSTOL 200 UG/1
800 TABLET ORAL AS NEEDED
Status: CANCELLED | OUTPATIENT
Start: 2020-10-19

## 2020-10-19 RX ORDER — IBUPROFEN 600 MG/1
600 TABLET ORAL EVERY 6 HOURS PRN
Status: CANCELLED | OUTPATIENT
Start: 2020-10-19

## 2020-10-19 RX ORDER — SODIUM CHLORIDE, SODIUM LACTATE, POTASSIUM CHLORIDE, CALCIUM CHLORIDE 600; 310; 30; 20 MG/100ML; MG/100ML; MG/100ML; MG/100ML
125 INJECTION, SOLUTION INTRAVENOUS CONTINUOUS
Status: CANCELLED | OUTPATIENT
Start: 2020-10-19

## 2020-10-19 RX ORDER — OXYCODONE HYDROCHLORIDE AND ACETAMINOPHEN 5; 325 MG/1; MG/1
1 TABLET ORAL EVERY 4 HOURS PRN
Status: CANCELLED | OUTPATIENT
Start: 2020-10-19 | End: 2020-10-29

## 2020-10-19 RX ORDER — MORPHINE SULFATE 2 MG/ML
2 INJECTION, SOLUTION INTRAMUSCULAR; INTRAVENOUS
Status: CANCELLED | OUTPATIENT
Start: 2020-10-19 | End: 2020-10-29

## 2020-10-19 RX ORDER — TRISODIUM CITRATE DIHYDRATE AND CITRIC ACID MONOHYDRATE 500; 334 MG/5ML; MG/5ML
30 SOLUTION ORAL ONCE AS NEEDED
Status: CANCELLED | OUTPATIENT
Start: 2020-10-19

## 2020-10-19 RX ORDER — ONDANSETRON 2 MG/ML
4 INJECTION INTRAMUSCULAR; INTRAVENOUS EVERY 6 HOURS PRN
Status: CANCELLED | OUTPATIENT
Start: 2020-10-19

## 2020-10-19 RX ORDER — ACETAMINOPHEN 325 MG/1
650 TABLET ORAL EVERY 4 HOURS PRN
Status: CANCELLED | OUTPATIENT
Start: 2020-10-19

## 2020-10-19 RX ORDER — SODIUM CHLORIDE 0.9 % (FLUSH) 0.9 %
3-10 SYRINGE (ML) INJECTION AS NEEDED
Status: CANCELLED | OUTPATIENT
Start: 2020-10-19

## 2020-10-19 RX ORDER — OXYTOCIN/0.9 % SODIUM CHLORIDE 30/500 ML
125 PLASTIC BAG, INJECTION (ML) INTRAVENOUS CONTINUOUS PRN
Status: CANCELLED | OUTPATIENT
Start: 2020-10-19

## 2020-10-19 RX ORDER — MISOPROSTOL 100 MCG
25 TABLET ORAL ONCE
Status: CANCELLED | OUTPATIENT
Start: 2020-10-19 | End: 2020-10-19

## 2020-10-19 RX ORDER — METHYLERGONOVINE MALEATE 0.2 MG/ML
200 INJECTION INTRAVENOUS ONCE AS NEEDED
Status: CANCELLED | OUTPATIENT
Start: 2020-10-19

## 2020-10-19 RX ORDER — FAMOTIDINE 20 MG/1
20 TABLET, FILM COATED ORAL EVERY 12 HOURS SCHEDULED
Status: CANCELLED | OUTPATIENT
Start: 2020-10-19

## 2020-10-19 RX ORDER — OXYTOCIN/0.9 % SODIUM CHLORIDE 30/500 ML
999 PLASTIC BAG, INJECTION (ML) INTRAVENOUS ONCE
Status: CANCELLED | OUTPATIENT
Start: 2020-10-19

## 2020-10-19 RX ORDER — FAMOTIDINE 10 MG/ML
20 INJECTION, SOLUTION INTRAVENOUS ONCE AS NEEDED
Status: CANCELLED | OUTPATIENT
Start: 2020-10-19

## 2020-10-19 RX ORDER — BUTORPHANOL TARTRATE 1 MG/ML
1 INJECTION, SOLUTION INTRAMUSCULAR; INTRAVENOUS
Status: CANCELLED | OUTPATIENT
Start: 2020-10-19

## 2020-10-19 RX ORDER — CARBOPROST TROMETHAMINE 250 UG/ML
250 INJECTION, SOLUTION INTRAMUSCULAR AS NEEDED
Status: CANCELLED | OUTPATIENT
Start: 2020-10-19

## 2020-10-19 RX ORDER — LIDOCAINE HYDROCHLORIDE 10 MG/ML
5 INJECTION, SOLUTION EPIDURAL; INFILTRATION; INTRACAUDAL; PERINEURAL AS NEEDED
Status: CANCELLED | OUTPATIENT
Start: 2020-10-19

## 2020-10-20 ENCOUNTER — HOSPITAL ENCOUNTER (INPATIENT)
Facility: HOSPITAL | Age: 40
LOS: 3 days | Discharge: HOME | End: 2020-10-23
Attending: OBSTETRICS & GYNECOLOGY | Admitting: OBSTETRICS & GYNECOLOGY

## 2020-10-20 ENCOUNTER — HOSPITAL ENCOUNTER (OUTPATIENT)
Dept: LABOR AND DELIVERY | Facility: HOSPITAL | Age: 40
Discharge: HOME | End: 2020-10-20

## 2020-10-20 DIAGNOSIS — O10.919 CHRONIC HYPERTENSION AFFECTING PREGNANCY: ICD-10-CM

## 2020-10-20 LAB
ABO GROUP BLD: NORMAL
AMPHET+METHAMPHET UR QL: NEGATIVE
AMPHETAMINES UR QL: NEGATIVE
BARBITURATES UR QL SCN: NEGATIVE
BENZODIAZ UR QL SCN: POSITIVE
BLD GP AB SCN SERPL QL: NEGATIVE
BUPRENORPHINE SERPL-MCNC: POSITIVE NG/ML
CANNABINOIDS SERPL QL: NEGATIVE
COCAINE UR QL: NEGATIVE
DEPRECATED RDW RBC AUTO: 41.9 FL (ref 37–54)
ERYTHROCYTE [DISTWIDTH] IN BLOOD BY AUTOMATED COUNT: 13.8 % (ref 12.3–15.4)
HCT VFR BLD AUTO: 33.5 % (ref 34–46.6)
HGB BLD-MCNC: 11.5 G/DL (ref 12–15.9)
MCH RBC QN AUTO: 29.4 PG (ref 26.6–33)
MCHC RBC AUTO-ENTMCNC: 34.3 G/DL (ref 31.5–35.7)
MCV RBC AUTO: 85.7 FL (ref 79–97)
METHADONE UR QL SCN: NEGATIVE
OPIATES UR QL: NEGATIVE
OXYCODONE UR QL SCN: NEGATIVE
PCP UR QL SCN: NEGATIVE
PLATELET # BLD AUTO: 119 10*3/MM3 (ref 140–450)
PMV BLD AUTO: 11.2 FL (ref 6–12)
PROPOXYPH UR QL: NEGATIVE
RBC # BLD AUTO: 3.91 10*6/MM3 (ref 3.77–5.28)
RH BLD: POSITIVE
T&S EXPIRATION DATE: NORMAL
TRICYCLICS UR QL SCN: NEGATIVE
WBC # BLD AUTO: 10.01 10*3/MM3 (ref 3.4–10.8)

## 2020-10-20 PROCEDURE — 85027 COMPLETE CBC AUTOMATED: CPT | Performed by: OBSTETRICS & GYNECOLOGY

## 2020-10-20 PROCEDURE — 86901 BLOOD TYPING SEROLOGIC RH(D): CPT | Performed by: OBSTETRICS & GYNECOLOGY

## 2020-10-20 PROCEDURE — 86850 RBC ANTIBODY SCREEN: CPT | Performed by: OBSTETRICS & GYNECOLOGY

## 2020-10-20 PROCEDURE — 86900 BLOOD TYPING SEROLOGIC ABO: CPT | Performed by: OBSTETRICS & GYNECOLOGY

## 2020-10-20 PROCEDURE — G0480 DRUG TEST DEF 1-7 CLASSES: HCPCS | Performed by: OBSTETRICS & GYNECOLOGY

## 2020-10-20 PROCEDURE — 80306 DRUG TEST PRSMV INSTRMNT: CPT | Performed by: OBSTETRICS & GYNECOLOGY

## 2020-10-20 RX ORDER — SODIUM CHLORIDE 0.9 % (FLUSH) 0.9 %
3 SYRINGE (ML) INJECTION EVERY 12 HOURS SCHEDULED
Status: DISCONTINUED | OUTPATIENT
Start: 2020-10-20 | End: 2020-10-22 | Stop reason: HOSPADM

## 2020-10-20 RX ORDER — PROMETHAZINE HYDROCHLORIDE 25 MG/1
12.5 TABLET ORAL EVERY 6 HOURS PRN
Status: DISCONTINUED | OUTPATIENT
Start: 2020-10-20 | End: 2020-10-22 | Stop reason: HOSPADM

## 2020-10-20 RX ORDER — LIDOCAINE HYDROCHLORIDE 10 MG/ML
5 INJECTION, SOLUTION EPIDURAL; INFILTRATION; INTRACAUDAL; PERINEURAL AS NEEDED
Status: DISCONTINUED | OUTPATIENT
Start: 2020-10-20 | End: 2020-10-22 | Stop reason: HOSPADM

## 2020-10-20 RX ORDER — SODIUM CHLORIDE, SODIUM LACTATE, POTASSIUM CHLORIDE, CALCIUM CHLORIDE 600; 310; 30; 20 MG/100ML; MG/100ML; MG/100ML; MG/100ML
125 INJECTION, SOLUTION INTRAVENOUS CONTINUOUS
Status: DISCONTINUED | OUTPATIENT
Start: 2020-10-20 | End: 2020-10-22

## 2020-10-20 RX ORDER — MISOPROSTOL 100 MCG
25 TABLET ORAL ONCE
Status: DISCONTINUED | OUTPATIENT
Start: 2020-10-20 | End: 2020-10-20

## 2020-10-20 RX ORDER — FLUOXETINE HYDROCHLORIDE 20 MG/1
40 CAPSULE ORAL DAILY
Status: DISCONTINUED | OUTPATIENT
Start: 2020-10-20 | End: 2020-10-22

## 2020-10-20 RX ORDER — BUTORPHANOL TARTRATE 1 MG/ML
1 INJECTION, SOLUTION INTRAMUSCULAR; INTRAVENOUS
Status: DISCONTINUED | OUTPATIENT
Start: 2020-10-20 | End: 2020-10-22 | Stop reason: HOSPADM

## 2020-10-20 RX ORDER — ONDANSETRON 4 MG/1
4 TABLET, FILM COATED ORAL EVERY 6 HOURS PRN
Status: DISCONTINUED | OUTPATIENT
Start: 2020-10-20 | End: 2020-10-22 | Stop reason: HOSPADM

## 2020-10-20 RX ORDER — LURASIDONE HYDROCHLORIDE 40 MG/1
60 TABLET, FILM COATED ORAL DAILY
Status: DISCONTINUED | OUTPATIENT
Start: 2020-10-20 | End: 2020-10-21

## 2020-10-20 RX ORDER — BUTORPHANOL TARTRATE 1 MG/ML
2 INJECTION, SOLUTION INTRAMUSCULAR; INTRAVENOUS
Status: DISCONTINUED | OUTPATIENT
Start: 2020-10-20 | End: 2020-10-22 | Stop reason: HOSPADM

## 2020-10-20 RX ORDER — ONDANSETRON 2 MG/ML
4 INJECTION INTRAMUSCULAR; INTRAVENOUS EVERY 6 HOURS PRN
Status: DISCONTINUED | OUTPATIENT
Start: 2020-10-20 | End: 2020-10-22 | Stop reason: HOSPADM

## 2020-10-20 RX ORDER — FAMOTIDINE 10 MG/ML
20 INJECTION, SOLUTION INTRAVENOUS EVERY 12 HOURS SCHEDULED
Status: DISCONTINUED | OUTPATIENT
Start: 2020-10-20 | End: 2020-10-22 | Stop reason: HOSPADM

## 2020-10-20 RX ORDER — CLONAZEPAM 0.5 MG/1
0.5 TABLET ORAL 3 TIMES DAILY PRN
Status: DISCONTINUED | OUTPATIENT
Start: 2020-10-20 | End: 2020-10-21

## 2020-10-20 RX ORDER — PROMETHAZINE HYDROCHLORIDE 12.5 MG/1
12.5 SUPPOSITORY RECTAL EVERY 6 HOURS PRN
Status: DISCONTINUED | OUTPATIENT
Start: 2020-10-20 | End: 2020-10-22 | Stop reason: HOSPADM

## 2020-10-20 RX ORDER — FAMOTIDINE 20 MG/1
20 TABLET, FILM COATED ORAL EVERY 12 HOURS SCHEDULED
Status: DISCONTINUED | OUTPATIENT
Start: 2020-10-20 | End: 2020-10-22 | Stop reason: HOSPADM

## 2020-10-20 RX ORDER — BUPRENORPHINE HYDROCHLORIDE 8 MG/1
8 TABLET SUBLINGUAL 2 TIMES DAILY
Status: DISCONTINUED | OUTPATIENT
Start: 2020-10-21 | End: 2020-10-22

## 2020-10-20 RX ORDER — TRISODIUM CITRATE DIHYDRATE AND CITRIC ACID MONOHYDRATE 500; 334 MG/5ML; MG/5ML
30 SOLUTION ORAL ONCE AS NEEDED
Status: DISCONTINUED | OUTPATIENT
Start: 2020-10-20 | End: 2020-10-22 | Stop reason: HOSPADM

## 2020-10-20 RX ORDER — MISOPROSTOL 100 MCG
25 TABLET ORAL EVERY 6 HOURS
Status: COMPLETED | OUTPATIENT
Start: 2020-10-20 | End: 2020-10-21

## 2020-10-20 RX ORDER — BUPRENORPHINE 2 MG/1
6 TABLET SUBLINGUAL NIGHTLY
Status: DISCONTINUED | OUTPATIENT
Start: 2020-10-20 | End: 2020-10-22

## 2020-10-20 RX ORDER — SODIUM CHLORIDE 0.9 % (FLUSH) 0.9 %
3-10 SYRINGE (ML) INJECTION AS NEEDED
Status: DISCONTINUED | OUTPATIENT
Start: 2020-10-20 | End: 2020-10-22 | Stop reason: HOSPADM

## 2020-10-20 RX ORDER — TERBUTALINE SULFATE 1 MG/ML
0.25 INJECTION, SOLUTION SUBCUTANEOUS AS NEEDED
Status: DISCONTINUED | OUTPATIENT
Start: 2020-10-20 | End: 2020-10-22 | Stop reason: HOSPADM

## 2020-10-20 RX ADMIN — LURASIDONE HYDROCHLORIDE 60 MG: 40 TABLET, FILM COATED ORAL at 23:26

## 2020-10-20 RX ADMIN — SODIUM CHLORIDE, POTASSIUM CHLORIDE, SODIUM LACTATE AND CALCIUM CHLORIDE 125 ML/HR: 600; 310; 30; 20 INJECTION, SOLUTION INTRAVENOUS at 21:45

## 2020-10-20 RX ADMIN — MISOPROSTOL 25 MCG: 100 TABLET ORAL at 22:59

## 2020-10-20 RX ADMIN — BUPRENORPHINE 6 MG: 2 TABLET SUBLINGUAL at 23:24

## 2020-10-20 RX ADMIN — FLUOXETINE HYDROCHLORIDE 40 MG: 20 CAPSULE ORAL at 23:25

## 2020-10-20 RX ADMIN — CLONAZEPAM 0.5 MG: 0.5 TABLET ORAL at 23:23

## 2020-10-20 NOTE — H&P
[facility]  Arin De La Cruz  : 1980  MRN: [id number]  CSN: [id number]    History and Physical    Subjective   Arin De La Cruz is a 39 y.o. year old  with an Estimated Date of Delivery: 20 scheduled for induction of labor on 10- due to chronic hypertension on medications .  Prenatal care has been with Dr. Alexus Hyatt.  It has been complicated by trichomoniasis, narcotics maintenance program, AMA (genetic screening was normal), chronic hypertension (on meds) and hepatitis c as well as gestational thrombocytopenia.    OB History    Para Term  AB Living   4 3 0 0 0 3   SAB TAB Ectopic Molar Multiple Live Births   0 0 0 0 0 3      # Outcome Date GA Lbr Mendez/2nd Weight Sex Delivery Anes PTL Lv   4 Current            3 Para 14   3289 g (7 lb 4 oz) M Vag-Spont   ERA   2 Para 07   2948 g (6 lb 8 oz) F Vag-Spont   ERA   1 Para 04   3232 g (7 lb 2 oz) F Vag-Spont   ERA     Past Medical History:   Diagnosis Date   • Anxiety    • Bipolar affective disorder (CMS/HCC)    • Depression    • Hepatitis C    • Kidney infection      Past Surgical History:   Procedure Laterality Date   • APPENDECTOMY     • CYSTOSCOPY W/ URETERAL STENT PLACEMENT Left 10/6/2018    Procedure: CYSTOSCOPY LEFT URETERAL STENT INSERTION;  Surgeon: Tyrone Machuca MD;  Location: [facility];  Service: Urology   • TONSILLECTOMY     • URETEROSCOPY LASER LITHOTRIPSY WITH STENT INSERTION Left 10/17/2018    Procedure: URETEROSCOPY LASER LITHOTRIPSY WITH STENT EXCHANGE STONE EXTRACTION;  Surgeon: Tyrone Machuca MD;  Location: [facility];  Service: Urology       Current Outpatient Medications:   •  aspirin 81 MG tablet, Take 1 tablet by mouth Daily., Disp: 30 tablet, Rfl: 5  •  buprenorphine (SUBUTEX) 8 MG sublingual tablet SL tablet, 8 mg 3 (Three) Times a Day., Disp: , Rfl:   •  clonazePAM (KlonoPIN) 0.5 MG tablet, Take 0.5 mg by mouth., Disp: , Rfl:   •  docusate sodium (Colace) 100 MG capsule,  Take 1 capsule by mouth 2 (Two) Times a Day., Disp: 60 capsule, Rfl: 1  •  doxylamine-pyridoxine (DICLEGIS) 10-10 MG tablet delayed-release EC tablet, Take 2 tablets by mouth At Night As Needed (nausea vomiting)., Disp: 30 tablet, Rfl: 0  •  famotidine (PEPCID) 40 MG tablet, Take 0.5 tablets by mouth At Night As Needed for Heartburn., Disp: 60 tablet, Rfl: 2  •  FLUoxetine (PROzac) 40 MG capsule, Take 40 capsules by mouth Daily., Disp: , Rfl:   •  labetalol (NORMODYNE) 100 MG tablet, Take 1 tablet by mouth Daily., Disp: 30 tablet, Rfl: 3  •  LATUDA 60 MG tablet tablet, , Disp: , Rfl:   •  Prenat-Fe Carbonyl-FA-Omega 3 (One-A-Day Womens Prenatal 1) 28-0.8-235 MG capsule, Take 1 tablet by mouth Daily., Disp: 90 capsule, Rfl: 1  •  promethazine (PHENERGAN) 12.5 MG tablet, Take 1 tablet by mouth Every 6 (Six) Hours As Needed for Nausea or Vomiting., Disp: 40 tablet, Rfl: 0  •  valACYclovir (VALTREX) 1000 MG tablet, Take 0.5 tablets by mouth 2 (Two) Times a Day., Disp: 30 tablet, Rfl: 1    No Known Allergies  Social History    Tobacco Use      Smoking status: Current Every Day Smoker        Packs/day: 0.25        Years: 20.00        Pack years: 5        Types: Cigarettes      Smokeless tobacco: Never Used    Review of Systems      Objective   LMP 01/28/2020   General: well developed; well nourished  no acute distress   Heart: Not performed.   Lungs: breathing is unlabored   Abdomen:  Cervix: soft, non-tender; no masses  no umbilical or inguinal hernias are present  no hepato-splenomegaly  was checked (by me): 1 cm / 50 % / -3  EFW 7 pounds  Pelvis seems clinically adequate         Prenatal Labs  Lab Results   Component Value Date    HGB 11.3 10/13/2020    HEPBSAG Negative 04/15/2020    ABORH O Rh Positive 07/11/2014    ABO O 09/02/2020    RH Positive 09/02/2020    ABSCRN Negative 09/02/2020    [id number] Non Reactive 04/15/2020    HEPCVIRUSABY >11.0 (H) 04/15/2020    URINECX Final report 04/15/2020       Recent Labs  Lab  Results   Component Value Date    HGB 11.3 10/13/2020    HCT 33.7 (L) 10/13/2020    WBC 9.2 10/13/2020     (L) 10/13/2020           Assessment   1. IUP with an Estimated Date of Delivery: 20  2. Induction of labor scheduled on 10- for chronic hypertension on meds   3. Gestational thrombocytopenia   4. Hepatitis C- see most recent viral load   5. Trichomonas treated twice during pregnancy  6. GBS negative   7. Subutex use as well as unable to come off of klonopin.         Plan   1. Risks and benefits of induction discussed.  Patient understands that IOL increases the risk of  delivery over spontaneous labor, especially if the patient does not have a favorable cervix.  2. Discussed with patient previously risk of baby having to go to NICU   3. Will check platelets on admission and consider stress dose of steroids if platelets are low.     Beth Hyatt, DO  10/19/2020

## 2020-10-20 NOTE — H&P (VIEW-ONLY)
[facility]  Arin De La Cruz  : 1980  MRN: [id number]  CSN: [id number]    History and Physical    Subjective   Arin De La Cruz is a 39 y.o. year old  with an Estimated Date of Delivery: 20 scheduled for induction of labor on 10- due to chronic hypertension on medications .  Prenatal care has been with Dr. Alexus Hyatt.  It has been complicated by trichomoniasis, narcotics maintenance program, AMA (genetic screening was normal), chronic hypertension (on meds) and hepatitis c as well as gestational thrombocytopenia.    OB History    Para Term  AB Living   4 3 0 0 0 3   SAB TAB Ectopic Molar Multiple Live Births   0 0 0 0 0 3      # Outcome Date GA Lbr Mendez/2nd Weight Sex Delivery Anes PTL Lv   4 Current            3 Para 14   3289 g (7 lb 4 oz) M Vag-Spont   ERA   2 Para 07   2948 g (6 lb 8 oz) F Vag-Spont   ERA   1 Para 04   3232 g (7 lb 2 oz) F Vag-Spont   ERA     Past Medical History:   Diagnosis Date   • Anxiety    • Bipolar affective disorder (CMS/HCC)    • Depression    • Hepatitis C    • Kidney infection      Past Surgical History:   Procedure Laterality Date   • APPENDECTOMY     • CYSTOSCOPY W/ URETERAL STENT PLACEMENT Left 10/6/2018    Procedure: CYSTOSCOPY LEFT URETERAL STENT INSERTION;  Surgeon: Tyrone Machuca MD;  Location: [facility] OR;  Service: Urology   • TONSILLECTOMY     • URETEROSCOPY LASER LITHOTRIPSY WITH STENT INSERTION Left 10/17/2018    Procedure: URETEROSCOPY LASER LITHOTRIPSY WITH STENT EXCHANGE STONE EXTRACTION;  Surgeon: Tyrone Machuca MD;  Location: [facility] OR;  Service: Urology       Current Outpatient Medications:   •  aspirin 81 MG tablet, Take 1 tablet by mouth Daily., Disp: 30 tablet, Rfl: 5  •  buprenorphine (SUBUTEX) 8 MG sublingual tablet SL tablet, 8 mg 3 (Three) Times a Day., Disp: , Rfl:   •  clonazePAM (KlonoPIN) 0.5 MG tablet, Take 0.5 mg by mouth., Disp: , Rfl:   •  docusate sodium (Colace) 100 MG capsule,  Take 1 capsule by mouth 2 (Two) Times a Day., Disp: 60 capsule, Rfl: 1  •  doxylamine-pyridoxine (DICLEGIS) 10-10 MG tablet delayed-release EC tablet, Take 2 tablets by mouth At Night As Needed (nausea vomiting)., Disp: 30 tablet, Rfl: 0  •  famotidine (PEPCID) 40 MG tablet, Take 0.5 tablets by mouth At Night As Needed for Heartburn., Disp: 60 tablet, Rfl: 2  •  FLUoxetine (PROzac) 40 MG capsule, Take 40 capsules by mouth Daily., Disp: , Rfl:   •  labetalol (NORMODYNE) 100 MG tablet, Take 1 tablet by mouth Daily., Disp: 30 tablet, Rfl: 3  •  LATUDA 60 MG tablet tablet, , Disp: , Rfl:   •  Prenat-Fe Carbonyl-FA-Omega 3 (One-A-Day Womens Prenatal 1) 28-0.8-235 MG capsule, Take 1 tablet by mouth Daily., Disp: 90 capsule, Rfl: 1  •  promethazine (PHENERGAN) 12.5 MG tablet, Take 1 tablet by mouth Every 6 (Six) Hours As Needed for Nausea or Vomiting., Disp: 40 tablet, Rfl: 0  •  valACYclovir (VALTREX) 1000 MG tablet, Take 0.5 tablets by mouth 2 (Two) Times a Day., Disp: 30 tablet, Rfl: 1    No Known Allergies  Social History    Tobacco Use      Smoking status: Current Every Day Smoker        Packs/day: 0.25        Years: 20.00        Pack years: 5        Types: Cigarettes      Smokeless tobacco: Never Used    Review of Systems      Objective   LMP 01/28/2020   General: well developed; well nourished  no acute distress   Heart: Not performed.   Lungs: breathing is unlabored   Abdomen:  Cervix: soft, non-tender; no masses  no umbilical or inguinal hernias are present  no hepato-splenomegaly  was checked (by me): 1 cm / 50 % / -3  EFW 7 pounds  Pelvis seems clinically adequate         Prenatal Labs  Lab Results   Component Value Date    HGB 11.3 10/13/2020    HEPBSAG Negative 04/15/2020    ABORH O Rh Positive 07/11/2014    ABO O 09/02/2020    RH Positive 09/02/2020    ABSCRN Negative 09/02/2020    SSZ2NCC2 Non Reactive 04/15/2020    HEPCVIRUSABY >11.0 (H) 04/15/2020    URINECX Final report 04/15/2020       Recent Labs  Lab  Results   Component Value Date    HGB 11.3 10/13/2020    HCT 33.7 (L) 10/13/2020    WBC 9.2 10/13/2020     (L) 10/13/2020           Assessment   1. IUP with an Estimated Date of Delivery: 20  2. Induction of labor scheduled on 10- for chronic hypertension on meds   3. Gestational thrombocytopenia   4. Hepatitis C- see most recent viral load   5. Trichomonas treated twice during pregnancy  6. GBS negative   7. Subutex use as well as unable to come off of klonopin.         Plan   1. Risks and benefits of induction discussed.  Patient understands that IOL increases the risk of  delivery over spontaneous labor, especially if the patient does not have a favorable cervix.  2. Discussed with patient previously risk of baby having to go to NICU   3. Will check platelets on admission and consider stress dose of steroids if platelets are low.     Beth Hyatt, DO  10/19/2020

## 2020-10-20 NOTE — PROGRESS NOTES
at 38.1 IUP presents for follow up prenatal care visit. No obstetrical complaints. Denies headache, blurry vision or RUQ pain.   PE   SVE: 1 cm   A/P  at 38.1 IUP CHTN on meds   Scheduled IOL tomorrow night   Discussed risk, benefits   All questions answered

## 2020-10-21 ENCOUNTER — ANESTHESIA EVENT (OUTPATIENT)
Dept: LABOR AND DELIVERY | Facility: HOSPITAL | Age: 40
End: 2020-10-21

## 2020-10-21 ENCOUNTER — ANESTHESIA (OUTPATIENT)
Dept: LABOR AND DELIVERY | Facility: HOSPITAL | Age: 40
End: 2020-10-21

## 2020-10-21 PROCEDURE — 25010000002 TERBUTALINE PER 1 MG: Performed by: OBSTETRICS & GYNECOLOGY

## 2020-10-21 PROCEDURE — 6A550ZT PHERESIS OF CORD BLOOD STEM CELLS, SINGLE: ICD-10-PCS | Performed by: OBSTETRICS & GYNECOLOGY

## 2020-10-21 PROCEDURE — 25010000002 ROPIVACAINE PER 1 MG: Performed by: NURSE ANESTHETIST, CERTIFIED REGISTERED

## 2020-10-21 PROCEDURE — C1755 CATHETER, INTRASPINAL: HCPCS | Performed by: NURSE ANESTHETIST, CERTIFIED REGISTERED

## 2020-10-21 PROCEDURE — 10907ZC DRAINAGE OF AMNIOTIC FLUID, THERAPEUTIC FROM PRODUCTS OF CONCEPTION, VIA NATURAL OR ARTIFICIAL OPENING: ICD-10-PCS | Performed by: OBSTETRICS & GYNECOLOGY

## 2020-10-21 PROCEDURE — 25010000002 FENTANYL CITRATE (PF) 250 MCG/5ML SOLUTION: Performed by: NURSE ANESTHETIST, CERTIFIED REGISTERED

## 2020-10-21 PROCEDURE — 25010000002 BUTORPHANOL PER 1 MG: Performed by: OBSTETRICS & GYNECOLOGY

## 2020-10-21 PROCEDURE — 59410 OBSTETRICAL CARE: CPT | Performed by: OBSTETRICS & GYNECOLOGY

## 2020-10-21 RX ORDER — CLONAZEPAM 0.5 MG/1
0.5 TABLET ORAL 2 TIMES DAILY PRN
Status: DISCONTINUED | OUTPATIENT
Start: 2020-10-21 | End: 2020-10-21 | Stop reason: SDUPTHER

## 2020-10-21 RX ORDER — CLONAZEPAM 0.5 MG/1
0.5 TABLET ORAL 2 TIMES DAILY PRN
Status: DISCONTINUED | OUTPATIENT
Start: 2020-10-21 | End: 2020-10-22

## 2020-10-21 RX ORDER — LURASIDONE HYDROCHLORIDE 40 MG/1
60 TABLET, FILM COATED ORAL DAILY
Status: DISCONTINUED | OUTPATIENT
Start: 2020-10-22 | End: 2020-10-21 | Stop reason: SDUPTHER

## 2020-10-21 RX ORDER — ROPIVACAINE HYDROCHLORIDE 5 MG/ML
INJECTION, SOLUTION EPIDURAL; INFILTRATION; PERINEURAL AS NEEDED
Status: DISCONTINUED | OUTPATIENT
Start: 2020-10-21 | End: 2020-10-22 | Stop reason: SURG

## 2020-10-21 RX ORDER — LABETALOL 100 MG/1
100 TABLET, FILM COATED ORAL DAILY
Status: DISCONTINUED | OUTPATIENT
Start: 2020-10-22 | End: 2020-10-23 | Stop reason: HOSPADM

## 2020-10-21 RX ORDER — HYDRALAZINE HYDROCHLORIDE 20 MG/ML
INJECTION INTRAMUSCULAR; INTRAVENOUS
Status: DISPENSED
Start: 2020-10-21 | End: 2020-10-22

## 2020-10-21 RX ORDER — EPHEDRINE SULFATE 50 MG/ML
10 INJECTION, SOLUTION INTRAVENOUS
Status: COMPLETED | OUTPATIENT
Start: 2020-10-21 | End: 2020-10-21

## 2020-10-21 RX ORDER — FLUOXETINE HYDROCHLORIDE 20 MG/1
40 CAPSULE ORAL DAILY
Status: DISCONTINUED | OUTPATIENT
Start: 2020-10-22 | End: 2020-10-21 | Stop reason: SDUPTHER

## 2020-10-21 RX ORDER — OXYTOCIN/0.9 % SODIUM CHLORIDE 30/500 ML
999 PLASTIC BAG, INJECTION (ML) INTRAVENOUS ONCE
Status: DISCONTINUED | OUTPATIENT
Start: 2020-10-21 | End: 2020-10-22 | Stop reason: HOSPADM

## 2020-10-21 RX ORDER — EPHEDRINE SULFATE 50 MG/ML
INJECTION INTRAVENOUS
Status: DISPENSED
Start: 2020-10-21 | End: 2020-10-22

## 2020-10-21 RX ORDER — TRISODIUM CITRATE DIHYDRATE AND CITRIC ACID MONOHYDRATE 500; 334 MG/5ML; MG/5ML
30 SOLUTION ORAL ONCE
Status: DISCONTINUED | OUTPATIENT
Start: 2020-10-21 | End: 2020-10-22 | Stop reason: HOSPADM

## 2020-10-21 RX ORDER — BUPRENORPHINE HYDROCHLORIDE 8 MG/1
8 TABLET SUBLINGUAL 3 TIMES DAILY
Status: DISCONTINUED | OUTPATIENT
Start: 2020-10-22 | End: 2020-10-21

## 2020-10-21 RX ORDER — OXYTOCIN/0.9 % SODIUM CHLORIDE 30/500 ML
2 PLASTIC BAG, INJECTION (ML) INTRAVENOUS
Status: DISCONTINUED | OUTPATIENT
Start: 2020-10-21 | End: 2020-10-22

## 2020-10-21 RX ORDER — LIDOCAINE HYDROCHLORIDE AND EPINEPHRINE 15; 5 MG/ML; UG/ML
INJECTION, SOLUTION EPIDURAL
Status: COMPLETED | OUTPATIENT
Start: 2020-10-21 | End: 2020-10-21

## 2020-10-21 RX ORDER — FENTANYL CITRATE 50 UG/ML
INJECTION, SOLUTION INTRAMUSCULAR; INTRAVENOUS AS NEEDED
Status: DISCONTINUED | OUTPATIENT
Start: 2020-10-21 | End: 2020-10-22 | Stop reason: SURG

## 2020-10-21 RX ORDER — LURASIDONE HYDROCHLORIDE 40 MG/1
60 TABLET, FILM COATED ORAL NIGHTLY
Status: DISCONTINUED | OUTPATIENT
Start: 2020-10-21 | End: 2020-10-22

## 2020-10-21 RX ORDER — OXYTOCIN/0.9 % SODIUM CHLORIDE 30/500 ML
125 PLASTIC BAG, INJECTION (ML) INTRAVENOUS CONTINUOUS PRN
Status: DISCONTINUED | OUTPATIENT
Start: 2020-10-22 | End: 2020-10-22 | Stop reason: HOSPADM

## 2020-10-21 RX ORDER — OXYTOCIN/0.9 % SODIUM CHLORIDE 30/500 ML
250 PLASTIC BAG, INJECTION (ML) INTRAVENOUS CONTINUOUS
Status: ACTIVE | OUTPATIENT
Start: 2020-10-21 | End: 2020-10-22

## 2020-10-21 RX ADMIN — BUTORPHANOL TARTRATE 1 MG: 1 INJECTION, SOLUTION INTRAMUSCULAR; INTRAVENOUS at 22:31

## 2020-10-21 RX ADMIN — BUTORPHANOL TARTRATE 1 MG: 1 INJECTION, SOLUTION INTRAMUSCULAR; INTRAVENOUS at 10:43

## 2020-10-21 RX ADMIN — FENTANYL CITRATE 50 MCG: 50 INJECTION INTRAMUSCULAR; INTRAVENOUS at 11:33

## 2020-10-21 RX ADMIN — SODIUM CHLORIDE, POTASSIUM CHLORIDE, SODIUM LACTATE AND CALCIUM CHLORIDE 125 ML/HR: 600; 310; 30; 20 INJECTION, SOLUTION INTRAVENOUS at 05:14

## 2020-10-21 RX ADMIN — LURASIDONE HYDROCHLORIDE 60 MG: 40 TABLET, FILM COATED ORAL at 21:45

## 2020-10-21 RX ADMIN — SODIUM CHLORIDE, POTASSIUM CHLORIDE, SODIUM LACTATE AND CALCIUM CHLORIDE 125 ML/HR: 600; 310; 30; 20 INJECTION, SOLUTION INTRAVENOUS at 12:51

## 2020-10-21 RX ADMIN — SODIUM CHLORIDE, POTASSIUM CHLORIDE, SODIUM LACTATE AND CALCIUM CHLORIDE 125 ML/HR: 600; 310; 30; 20 INJECTION, SOLUTION INTRAVENOUS at 19:43

## 2020-10-21 RX ADMIN — ROPIVACAINE HYDROCHLORIDE 5 ML: 5 INJECTION, SOLUTION EPIDURAL; INFILTRATION; PERINEURAL at 19:36

## 2020-10-21 RX ADMIN — OXYTOCIN-SODIUM CHLORIDE 0.9% IV SOLN 30 UNIT/500ML 2 MILLI-UNITS/MIN: 30-0.9/5 SOLUTION at 09:58

## 2020-10-21 RX ADMIN — SODIUM CHLORIDE, POTASSIUM CHLORIDE, SODIUM LACTATE AND CALCIUM CHLORIDE 125 ML/HR: 600; 310; 30; 20 INJECTION, SOLUTION INTRAVENOUS at 10:45

## 2020-10-21 RX ADMIN — EPHEDRINE SULFATE 10 MG: 50 INJECTION INTRAVENOUS at 13:07

## 2020-10-21 RX ADMIN — ROPIVACAINE HYDROCHLORIDE 10 ML/HR: 2 INJECTION, SOLUTION EPIDURAL; INFILTRATION at 11:39

## 2020-10-21 RX ADMIN — CLONAZEPAM 0.5 MG: 0.5 TABLET ORAL at 09:16

## 2020-10-21 RX ADMIN — BUPRENORPHINE 6 MG: 2 TABLET SUBLINGUAL at 21:45

## 2020-10-21 RX ADMIN — ROPIVACAINE HYDROCHLORIDE 9 ML: 5 INJECTION, SOLUTION EPIDURAL; INFILTRATION; PERINEURAL at 11:33

## 2020-10-21 RX ADMIN — BUPRENORPHINE 8 MG: 8 TABLET SUBLINGUAL at 09:14

## 2020-10-21 RX ADMIN — FAMOTIDINE 20 MG: 20 TABLET, FILM COATED ORAL at 09:13

## 2020-10-21 RX ADMIN — CLONAZEPAM 0.5 MG: 0.5 TABLET ORAL at 21:44

## 2020-10-21 RX ADMIN — OXYTOCIN-SODIUM CHLORIDE 0.9% IV SOLN 30 UNIT/500ML 250 ML/HR: 30-0.9/5 SOLUTION at 23:38

## 2020-10-21 RX ADMIN — ROPIVACAINE HYDROCHLORIDE 10 ML: 5 INJECTION, SOLUTION EPIDURAL; INFILTRATION; PERINEURAL at 22:46

## 2020-10-21 RX ADMIN — LIDOCAINE HYDROCHLORIDE AND EPINEPHRINE 3 ML: 15; 5 INJECTION, SOLUTION EPIDURAL at 11:30

## 2020-10-21 RX ADMIN — ROPIVACAINE HYDROCHLORIDE 10 ML: 5 INJECTION, SOLUTION EPIDURAL; INFILTRATION; PERINEURAL at 12:14

## 2020-10-21 RX ADMIN — FENTANYL CITRATE 200 MCG: 50 INJECTION INTRAMUSCULAR; INTRAVENOUS at 11:39

## 2020-10-21 RX ADMIN — SODIUM CHLORIDE, POTASSIUM CHLORIDE, SODIUM LACTATE AND CALCIUM CHLORIDE 125 ML/HR: 600; 310; 30; 20 INJECTION, SOLUTION INTRAVENOUS at 11:39

## 2020-10-21 RX ADMIN — LIDOCAINE HYDROCHLORIDE AND EPINEPHRINE 3 ML: 15; 5 INJECTION, SOLUTION EPIDURAL at 12:13

## 2020-10-21 RX ADMIN — EPHEDRINE SULFATE 10 MG: 50 INJECTION INTRAVENOUS at 12:38

## 2020-10-21 RX ADMIN — MISOPROSTOL 25 MCG: 100 TABLET ORAL at 05:53

## 2020-10-21 RX ADMIN — BUPRENORPHINE 8 MG: 8 TABLET SUBLINGUAL at 17:24

## 2020-10-21 RX ADMIN — TERBUTALINE SULFATE 0.25 MG: 1 INJECTION, SOLUTION SUBCUTANEOUS at 11:37

## 2020-10-21 RX ADMIN — EPHEDRINE SULFATE 10 MG: 50 INJECTION INTRAVENOUS at 12:53

## 2020-10-21 NOTE — PLAN OF CARE
Goal Outcome Evaluation:  Plan of Care Reviewed With: patient, significant other        38W2D IUP CHTN CYTOTEC 25 MG X2

## 2020-10-21 NOTE — NURSING NOTE
Fetal heart rate audible in room but not tracing on OBIX  Pt sleeping and fetal movement heart with heart rate.

## 2020-10-21 NOTE — ANESTHESIA PREPROCEDURE EVALUATION
Anesthesia Evaluation     NPO Solid Status: > 8 hours  NPO Liquid Status: > 8 hours           Airway   Mallampati: II  TM distance: >3 FB  Neck ROM: full  Dental    (+) poor dentition    Pulmonary    (+) a smoker Current,   Cardiovascular     (+) hypertension,       Neuro/Psych  (+) psychiatric history Anxiety, Depression, Bipolar and PTSD,     GI/Hepatic/Renal/Endo    (+)   hepatitis C, liver disease,     Musculoskeletal     Abdominal    Substance History   (+) drug use (Currently taking Subutex)     OB/GYN    (+) Pregnant, pregnancy induced hypertension        Other                        Anesthesia Plan    ASA 2     epidural       Anesthetic plan, all risks, benefits, and alternatives have been provided, discussed and informed consent has been obtained with: patient and spouse/significant other.    Plan discussed with CRNA.

## 2020-10-21 NOTE — PROGRESS NOTES
Lina De La Cruz  : 1980  MRN: [id number]  CSN: [id number]    Labor progress note    Subjective   She reports is having good pain control with the epidural     Objective   Min/max vitals past 24 hours:  Temp  Min: 98.8 °F (37.1 °C)  Max: 98.8 °F (37.1 °C)   BP  Min: 71/45  Max: 151/105   Pulse  Min: 59  Max: 159   Resp  Min: 18  Max: 20        FHT's: reactive, reassuring and   external monitors used   Cervix: was checked (by me): 4 cm / 80 % / -2   Contractions: - external monitors used unable to trace contractions.       Assessment   1. IUP at 38w3d  2. Hepatitis C   3. CHTN on meds   4. Trichomonas   5. SUbutex use   6.      Plan   1.   IUPC placed  Allow labor to continue pending maternal and fetal status  Plan discussed with family and questions answered.  Understanding verbalized.    Beth Hyatt DO  10/21/2020  16:46 CDT

## 2020-10-21 NOTE — ANESTHESIA PROCEDURE NOTES
Labor Epidural      Patient location during procedure: OB  Performed By  CRNA: An Valdivia CRNA  Preanesthetic Checklist  Completed: patient identified, site marked, surgical consent, pre-op evaluation, timeout performed, IV checked, risks and benefits discussed and monitors and equipment checked  Prep:  Pt Position:sitting  Sterile Tech:gloves, cap and sterile barrier  Monitoring:continuous pulse oximetry, EKG and blood pressure monitoring  Epidural Block Procedure:  Approach:midline  Guidance:landmark technique and palpation technique  Location:L4-L5  Needle Type:Tuohy  Needle Gauge:17 G  Loss of Resistance Medium: saline  Loss of Resistance: 7cm  Cath Depth at skin:14 cm  Paresthesia: none  Aspiration:negative  Test Dose:negative  Test dose medication: lidocaine 1.5%-EPINEPHrine 1:200,000 (XYLOCAINE W/EPI) injection, 3 mL  Med administered at 10/21/2020 12:13 PM  Number of Attempts: 1  Post Assessment:  Dressing:secured with tape and occlusive dressing applied  Pt Tolerance:patient tolerated the procedure well with no apparent complications  Complications:no

## 2020-10-21 NOTE — INTERVAL H&P NOTE
H&P reviewed. The patient was examined and there are no changes to the H&P.   SVE: 1-2 cm   FHR: 120 bpm, mod raza  [place]: occasionl     Will start pitocin at 10 am     Discussed with patient and support persons

## 2020-10-21 NOTE — ANESTHESIA PROCEDURE NOTES
Labor Epidural      Patient location during procedure: OB  Performed By  CRNA: An Valdivia CRNA  Preanesthetic Checklist  Completed: patient identified, site marked, surgical consent, pre-op evaluation, timeout performed, IV checked, risks and benefits discussed and monitors and equipment checked  Prep:  Pt Position:sitting  Sterile Tech:gloves, cap, sterile barrier and mask  Prep:chlorhexidine gluconate and isopropyl alcohol  Monitoring:continuous pulse oximetry, EKG and blood pressure monitoring  Epidural Block Procedure:  Approach:midline  Guidance:landmark technique and palpation technique  Location:L3-L4  Needle Type:Tuohy  Needle Gauge:17 G  Loss of Resistance Medium: saline  Loss of Resistance: 7cm  Cath Depth at skin:15 cm  Paresthesia: none  Aspiration:negative  Test Dose:negative  Test dose medication: lidocaine 1.5%-EPINEPHrine 1:200,000 (XYLOCAINE W/EPI) injection, 3 mL  Med administered at 10/21/2020 11:30 AM  Number of Attempts: 2  Post Assessment:  Dressing:secured with tape and occlusive dressing applied  Pt Tolerance:patient tolerated the procedure well with no apparent complications  Complications:no

## 2020-10-21 NOTE — NURSING NOTE
Blood pressure dropped aftere epidural, decel of fhts for about 7 minutes before return to baseline. Turned side to side, ephedrine given. Nurse at bedside until blood pressure return to normal

## 2020-10-22 LAB
BASOPHILS # BLD AUTO: 0.03 10*3/MM3 (ref 0–0.2)
BASOPHILS NFR BLD AUTO: 0.3 % (ref 0–1.5)
DEPRECATED RDW RBC AUTO: 43.1 FL (ref 37–54)
EOSINOPHIL # BLD AUTO: 0.1 10*3/MM3 (ref 0–0.4)
EOSINOPHIL NFR BLD AUTO: 1 % (ref 0.3–6.2)
ERYTHROCYTE [DISTWIDTH] IN BLOOD BY AUTOMATED COUNT: 13.7 % (ref 12.3–15.4)
HCT VFR BLD AUTO: 31.1 % (ref 34–46.6)
HGB BLD-MCNC: 10.2 G/DL (ref 12–15.9)
HOLD SPECIMEN: NORMAL
IMM GRANULOCYTES # BLD AUTO: 0.05 10*3/MM3 (ref 0–0.05)
IMM GRANULOCYTES NFR BLD AUTO: 0.5 % (ref 0–0.5)
LYMPHOCYTES # BLD AUTO: 2.23 10*3/MM3 (ref 0.7–3.1)
LYMPHOCYTES NFR BLD AUTO: 21.9 % (ref 19.6–45.3)
MCH RBC QN AUTO: 29 PG (ref 26.6–33)
MCHC RBC AUTO-ENTMCNC: 32.8 G/DL (ref 31.5–35.7)
MCV RBC AUTO: 88.4 FL (ref 79–97)
MONOCYTES # BLD AUTO: 0.6 10*3/MM3 (ref 0.1–0.9)
MONOCYTES NFR BLD AUTO: 5.9 % (ref 5–12)
NEUTROPHILS NFR BLD AUTO: 7.17 10*3/MM3 (ref 1.7–7)
NEUTROPHILS NFR BLD AUTO: 70.4 % (ref 42.7–76)
NRBC BLD AUTO-RTO: 0 /100 WBC (ref 0–0.2)
PLATELET # BLD AUTO: 112 10*3/MM3 (ref 140–450)
PMV BLD AUTO: 11.6 FL (ref 6–12)
RBC # BLD AUTO: 3.52 10*6/MM3 (ref 3.77–5.28)
WBC # BLD AUTO: 10.18 10*3/MM3 (ref 3.4–10.8)

## 2020-10-22 PROCEDURE — 85025 COMPLETE CBC W/AUTO DIFF WBC: CPT | Performed by: OBSTETRICS & GYNECOLOGY

## 2020-10-22 PROCEDURE — 88307 TISSUE EXAM BY PATHOLOGIST: CPT | Performed by: OBSTETRICS & GYNECOLOGY

## 2020-10-22 RX ORDER — BUPRENORPHINE HYDROCHLORIDE 8 MG/1
8 TABLET SUBLINGUAL
Status: DISCONTINUED | OUTPATIENT
Start: 2020-10-23 | End: 2020-10-23 | Stop reason: HOSPADM

## 2020-10-22 RX ORDER — OXYCODONE AND ACETAMINOPHEN 7.5; 325 MG/1; MG/1
2 TABLET ORAL EVERY 4 HOURS PRN
Status: DISCONTINUED | OUTPATIENT
Start: 2020-10-22 | End: 2020-10-22 | Stop reason: HOSPADM

## 2020-10-22 RX ORDER — PROMETHAZINE HYDROCHLORIDE 12.5 MG/1
12.5 SUPPOSITORY RECTAL EVERY 6 HOURS PRN
Status: DISCONTINUED | OUTPATIENT
Start: 2020-10-22 | End: 2020-10-23 | Stop reason: HOSPADM

## 2020-10-22 RX ORDER — BISACODYL 10 MG
10 SUPPOSITORY, RECTAL RECTAL DAILY PRN
Status: DISCONTINUED | OUTPATIENT
Start: 2020-10-22 | End: 2020-10-23 | Stop reason: HOSPADM

## 2020-10-22 RX ORDER — ONDANSETRON 2 MG/ML
4 INJECTION INTRAMUSCULAR; INTRAVENOUS EVERY 6 HOURS PRN
Status: DISCONTINUED | OUTPATIENT
Start: 2020-10-22 | End: 2020-10-22 | Stop reason: HOSPADM

## 2020-10-22 RX ORDER — PROMETHAZINE HYDROCHLORIDE 25 MG/1
12.5 TABLET ORAL EVERY 6 HOURS PRN
Status: DISCONTINUED | OUTPATIENT
Start: 2020-10-22 | End: 2020-10-22 | Stop reason: HOSPADM

## 2020-10-22 RX ORDER — CLONAZEPAM 0.5 MG/1
0.5 TABLET ORAL 2 TIMES DAILY
Status: DISCONTINUED | OUTPATIENT
Start: 2020-10-22 | End: 2020-10-23 | Stop reason: HOSPADM

## 2020-10-22 RX ORDER — BUPRENORPHINE HYDROCHLORIDE 8 MG/1
8 TABLET SUBLINGUAL 2 TIMES DAILY
Status: DISCONTINUED | OUTPATIENT
Start: 2020-10-22 | End: 2020-10-22

## 2020-10-22 RX ORDER — PRENATAL VIT/IRON FUM/FOLIC AC 27MG-0.8MG
1 TABLET ORAL DAILY
Status: DISCONTINUED | OUTPATIENT
Start: 2020-10-22 | End: 2020-10-23 | Stop reason: HOSPADM

## 2020-10-22 RX ORDER — LURASIDONE HYDROCHLORIDE 40 MG/1
60 TABLET, FILM COATED ORAL NIGHTLY
Status: DISCONTINUED | OUTPATIENT
Start: 2020-10-22 | End: 2020-10-23 | Stop reason: HOSPADM

## 2020-10-22 RX ORDER — BUPRENORPHINE 2 MG/1
6 TABLET SUBLINGUAL NIGHTLY
Status: DISCONTINUED | OUTPATIENT
Start: 2020-10-22 | End: 2020-10-22

## 2020-10-22 RX ORDER — IBUPROFEN 600 MG/1
600 TABLET ORAL EVERY 8 HOURS PRN
Status: DISCONTINUED | OUTPATIENT
Start: 2020-10-22 | End: 2020-10-23 | Stop reason: HOSPADM

## 2020-10-22 RX ORDER — CLONAZEPAM 0.5 MG/1
0.5 TABLET ORAL 2 TIMES DAILY PRN
Status: DISCONTINUED | OUTPATIENT
Start: 2020-10-22 | End: 2020-10-22

## 2020-10-22 RX ORDER — MORPHINE SULFATE 2 MG/ML
2 INJECTION, SOLUTION INTRAMUSCULAR; INTRAVENOUS
Status: DISCONTINUED | OUTPATIENT
Start: 2020-10-22 | End: 2020-10-22 | Stop reason: HOSPADM

## 2020-10-22 RX ORDER — HYDROCORTISONE 25 MG/G
1 CREAM TOPICAL AS NEEDED
Status: DISCONTINUED | OUTPATIENT
Start: 2020-10-22 | End: 2020-10-23 | Stop reason: HOSPADM

## 2020-10-22 RX ORDER — BUPRENORPHINE HYDROCHLORIDE 8 MG/1
8 TABLET SUBLINGUAL ONCE
Status: COMPLETED | OUTPATIENT
Start: 2020-10-22 | End: 2020-10-22

## 2020-10-22 RX ORDER — FAMOTIDINE 10 MG/ML
20 INJECTION, SOLUTION INTRAVENOUS ONCE AS NEEDED
Status: DISCONTINUED | OUTPATIENT
Start: 2020-10-22 | End: 2020-10-22 | Stop reason: HOSPADM

## 2020-10-22 RX ORDER — OXYCODONE HYDROCHLORIDE AND ACETAMINOPHEN 5; 325 MG/1; MG/1
1 TABLET ORAL EVERY 4 HOURS PRN
Status: DISCONTINUED | OUTPATIENT
Start: 2020-10-22 | End: 2020-10-22 | Stop reason: HOSPADM

## 2020-10-22 RX ORDER — ONDANSETRON 2 MG/ML
4 INJECTION INTRAMUSCULAR; INTRAVENOUS EVERY 6 HOURS PRN
Status: DISCONTINUED | OUTPATIENT
Start: 2020-10-22 | End: 2020-10-23 | Stop reason: HOSPADM

## 2020-10-22 RX ORDER — PROMETHAZINE HYDROCHLORIDE 12.5 MG/1
12.5 SUPPOSITORY RECTAL EVERY 6 HOURS PRN
Status: DISCONTINUED | OUTPATIENT
Start: 2020-10-22 | End: 2020-10-22 | Stop reason: HOSPADM

## 2020-10-22 RX ORDER — BUPRENORPHINE 2 MG/1
6 TABLET SUBLINGUAL EVERY 24 HOURS
Status: DISCONTINUED | OUTPATIENT
Start: 2020-10-22 | End: 2020-10-23 | Stop reason: HOSPADM

## 2020-10-22 RX ORDER — SODIUM CHLORIDE 0.9 % (FLUSH) 0.9 %
1-10 SYRINGE (ML) INJECTION AS NEEDED
Status: DISCONTINUED | OUTPATIENT
Start: 2020-10-22 | End: 2020-10-23 | Stop reason: HOSPADM

## 2020-10-22 RX ORDER — ONDANSETRON 4 MG/1
4 TABLET, FILM COATED ORAL EVERY 6 HOURS PRN
Status: DISCONTINUED | OUTPATIENT
Start: 2020-10-22 | End: 2020-10-22 | Stop reason: HOSPADM

## 2020-10-22 RX ORDER — METHYLERGONOVINE MALEATE 0.2 MG/ML
200 INJECTION INTRAVENOUS ONCE AS NEEDED
Status: DISCONTINUED | OUTPATIENT
Start: 2020-10-22 | End: 2020-10-22 | Stop reason: HOSPADM

## 2020-10-22 RX ORDER — FAMOTIDINE 10 MG/ML
20 INJECTION, SOLUTION INTRAVENOUS ONCE AS NEEDED
Status: DISCONTINUED | OUTPATIENT
Start: 2020-10-22 | End: 2020-10-23 | Stop reason: HOSPADM

## 2020-10-22 RX ORDER — HYDROCODONE BITARTRATE AND ACETAMINOPHEN 7.5; 325 MG/1; MG/1
1 TABLET ORAL EVERY 4 HOURS PRN
Status: DISCONTINUED | OUTPATIENT
Start: 2020-10-22 | End: 2020-10-23 | Stop reason: HOSPADM

## 2020-10-22 RX ORDER — ACETAMINOPHEN 650 MG/1
650 SUPPOSITORY RECTAL EVERY 4 HOURS PRN
Status: DISCONTINUED | OUTPATIENT
Start: 2020-10-22 | End: 2020-10-22 | Stop reason: HOSPADM

## 2020-10-22 RX ORDER — ACETAMINOPHEN 325 MG/1
650 TABLET ORAL EVERY 4 HOURS PRN
Status: DISCONTINUED | OUTPATIENT
Start: 2020-10-22 | End: 2020-10-22 | Stop reason: HOSPADM

## 2020-10-22 RX ORDER — MISOPROSTOL 200 UG/1
800 TABLET ORAL AS NEEDED
Status: DISCONTINUED | OUTPATIENT
Start: 2020-10-22 | End: 2020-10-22 | Stop reason: HOSPADM

## 2020-10-22 RX ORDER — DIPHENHYDRAMINE HCL 25 MG
25 CAPSULE ORAL NIGHTLY PRN
Status: DISCONTINUED | OUTPATIENT
Start: 2020-10-22 | End: 2020-10-23 | Stop reason: HOSPADM

## 2020-10-22 RX ORDER — FAMOTIDINE 20 MG/1
20 TABLET, FILM COATED ORAL ONCE AS NEEDED
Status: DISCONTINUED | OUTPATIENT
Start: 2020-10-22 | End: 2020-10-22 | Stop reason: HOSPADM

## 2020-10-22 RX ORDER — ONDANSETRON 4 MG/1
4 TABLET, FILM COATED ORAL EVERY 6 HOURS PRN
Status: DISCONTINUED | OUTPATIENT
Start: 2020-10-22 | End: 2020-10-23 | Stop reason: HOSPADM

## 2020-10-22 RX ORDER — PROMETHAZINE HYDROCHLORIDE 25 MG/1
25 TABLET ORAL EVERY 6 HOURS PRN
Status: DISCONTINUED | OUTPATIENT
Start: 2020-10-22 | End: 2020-10-23 | Stop reason: HOSPADM

## 2020-10-22 RX ORDER — IBUPROFEN 600 MG/1
600 TABLET ORAL EVERY 6 HOURS PRN
Status: DISCONTINUED | OUTPATIENT
Start: 2020-10-22 | End: 2020-10-22 | Stop reason: HOSPADM

## 2020-10-22 RX ORDER — DOCUSATE SODIUM 100 MG/1
100 CAPSULE, LIQUID FILLED ORAL 2 TIMES DAILY
Status: DISCONTINUED | OUTPATIENT
Start: 2020-10-22 | End: 2020-10-23 | Stop reason: HOSPADM

## 2020-10-22 RX ORDER — CARBOPROST TROMETHAMINE 250 UG/ML
250 INJECTION, SOLUTION INTRAMUSCULAR AS NEEDED
Status: DISCONTINUED | OUTPATIENT
Start: 2020-10-22 | End: 2020-10-22 | Stop reason: HOSPADM

## 2020-10-22 RX ORDER — FLUOXETINE HYDROCHLORIDE 20 MG/1
40 CAPSULE ORAL DAILY
Status: DISCONTINUED | OUTPATIENT
Start: 2020-10-22 | End: 2020-10-23 | Stop reason: HOSPADM

## 2020-10-22 RX ORDER — CALCIUM CARBONATE 200(500)MG
2 TABLET,CHEWABLE ORAL 3 TIMES DAILY PRN
Status: DISCONTINUED | OUTPATIENT
Start: 2020-10-22 | End: 2020-10-23 | Stop reason: HOSPADM

## 2020-10-22 RX ADMIN — LURASIDONE HYDROCHLORIDE 60 MG: 40 TABLET, FILM COATED ORAL at 20:22

## 2020-10-22 RX ADMIN — HYDROCODONE BITARTRATE AND ACETAMINOPHEN 1 TABLET: 7.5; 325 TABLET ORAL at 04:07

## 2020-10-22 RX ADMIN — CLONAZEPAM 0.5 MG: 0.5 TABLET ORAL at 15:29

## 2020-10-22 RX ADMIN — BUPRENORPHINE 6 MG: 2 TABLET SUBLINGUAL at 18:25

## 2020-10-22 RX ADMIN — BUPRENORPHINE 8 MG: 8 TABLET SUBLINGUAL at 08:26

## 2020-10-22 RX ADMIN — FLUOXETINE HYDROCHLORIDE 40 MG: 20 CAPSULE ORAL at 08:27

## 2020-10-22 RX ADMIN — BENZOCAINE AND LEVOMENTHOL: 200; 5 SPRAY TOPICAL at 04:07

## 2020-10-22 RX ADMIN — HYDROCODONE BITARTRATE AND ACETAMINOPHEN 1 TABLET: 7.5; 325 TABLET ORAL at 18:31

## 2020-10-22 RX ADMIN — LABETALOL HYDROCHLORIDE 100 MG: 100 TABLET, FILM COATED ORAL at 08:26

## 2020-10-22 RX ADMIN — PRENATAL VIT W/ FE FUMARATE-FA TAB 27-0.8 MG 1 TABLET: 27-0.8 TAB at 08:26

## 2020-10-22 RX ADMIN — DOCUSATE SODIUM 100 MG: 100 CAPSULE ORAL at 08:32

## 2020-10-22 RX ADMIN — OXYCODONE HYDROCHLORIDE AND ACETAMINOPHEN 2 TABLET: 7.5; 325 TABLET ORAL at 00:32

## 2020-10-22 RX ADMIN — IBUPROFEN 600 MG: 600 TABLET, FILM COATED ORAL at 18:31

## 2020-10-22 RX ADMIN — HYDROCODONE BITARTRATE AND ACETAMINOPHEN 1 TABLET: 7.5; 325 TABLET ORAL at 22:25

## 2020-10-22 RX ADMIN — CLONAZEPAM 0.5 MG: 0.5 TABLET ORAL at 08:36

## 2020-10-22 RX ADMIN — HYDROCODONE BITARTRATE AND ACETAMINOPHEN 1 TABLET: 7.5; 325 TABLET ORAL at 08:27

## 2020-10-22 RX ADMIN — BUPRENORPHINE 8 MG: 8 TABLET SUBLINGUAL at 14:24

## 2020-10-22 RX ADMIN — IBUPROFEN 600 MG: 600 TABLET, FILM COATED ORAL at 08:26

## 2020-10-22 RX ADMIN — HYDROCODONE BITARTRATE AND ACETAMINOPHEN 1 TABLET: 7.5; 325 TABLET ORAL at 13:34

## 2020-10-22 NOTE — PROGRESS NOTES
"[facility]  Vaginal Delivery Progress Note    Subjective   Postpartum Day 1: Vaginal Delivery    The patient feels well.  Her pain is well controlled with opioid analgesics.   She is ambulating well.  Patient describes her bleeding as thin lochia.    Breastfeeding: declines.    Objective     Vital Signs Range for the last 24 hours  Temperature: Temp:  [97.5 °F (36.4 °C)-98.8 °F (37.1 °C)] 97.8 °F (36.6 °C)   Temp Source: Temp src: Oral   BP: BP: ()/(47-97) 132/87   Pulse: Heart Rate:  [] 85   Respirations: Resp:  [16-18] 17   SPO2: SpO2:  [97 %-100 %] 98 %   O2 Amount (l/min):     O2 Devices Device (Oxygen Therapy): room air   Weight: Weight:  [89.4 kg (197 lb)] 89.4 kg (197 lb)     Admit Height:  Height: 167.6 cm (66\")      Physical Exam:  General:  no acute distresss.  Abdomen: abdomen is soft without significant tenderness, masses, organomegaly or guarding. Fundus: appropriate, firm, non tender  Extremities: normal, atraumatic, no cyanosis, and trace edema.     Lab results reviewed:  Yes   Rubella:  No results found for: RUBELLAIGGIN Nurse Transcribed from prenatal record --  No components found for: EXTRUBELQUAL  Rh Status:    RH type   Date Value Ref Range Status   10/20/2020 Positive  Final     Immunizations:   Immunization History   Administered Date(s) Administered   • Tdap 08/12/2020     Lab Results (last 24 hours)     Procedure Component Value Units Date/Time    Tissue Pathology Exam [[id number]] Collected: 10/22/20 0520    Specimen: Tissue from Placenta Updated: 10/22/20 1123    Extra Tubes [[id number]] Collected: 10/22/20 0535    Specimen: Blood, Venous Line Updated: 10/22/20 0869    Narrative:      The following orders were created for panel order Extra Tubes.  Procedure                               Abnormality         Status                     ---------                               -----------         ------                     Green Top (Gel)[[id number]]                                  " Final result                 Please view results for these tests on the individual orders.    Green Top (Gel) [[id number]] Collected: 10/22/20 0535    Specimen: Blood Updated: 10/22/20 0816     Extra Tube Hold for add-ons.     Comment: Auto resulted.       CBC & Differential [[id number]]  (Abnormal) Collected: 10/22/20 0535    Specimen: Blood Updated: 10/22/20 0644    Narrative:      The following orders were created for panel order CBC & Differential.  Procedure                               Abnormality         Status                     ---------                               -----------         ------                     CBC Auto Differential[[id number]]        Abnormal            Final result                 Please view results for these tests on the individual orders.    CBC Auto Differential [[id number]]  (Abnormal) Collected: 10/22/20 0535    Specimen: Blood Updated: 10/22/20 0644     WBC 10.18 10*3/mm3      RBC 3.52 10*6/mm3      Hemoglobin 10.2 g/dL      Hematocrit 31.1 %      MCV 88.4 fL      MCH 29.0 pg      MCHC 32.8 g/dL      RDW 13.7 %      RDW-SD 43.1 fl      MPV 11.6 fL      Platelets 112 10*3/mm3      Neutrophil % 70.4 %      Lymphocyte % 21.9 %      Monocyte % 5.9 %      Eosinophil % 1.0 %      Basophil % 0.3 %      Immature Grans % 0.5 %      Neutrophils, Absolute 7.17 10*3/mm3      Lymphocytes, Absolute 2.23 10*3/mm3      Monocytes, Absolute 0.60 10*3/mm3      Eosinophils, Absolute 0.10 10*3/mm3      Basophils, Absolute 0.03 10*3/mm3      Immature Grans, Absolute 0.05 10*3/mm3      nRBC 0.0 /100 WBC           Assessment/Plan       Chronic hypertension affecting pregnancy      Arin De La Cruz is Day 1  post-partum  Vaginal, Spontaneous  none .      Plan:  Continue current care.      Beth Hyatt DO  10/22/2020  13:00 CDT

## 2020-10-22 NOTE — PLAN OF CARE
Problem: Adult Inpatient Plan of Care  Goal: Plan of Care Review  Outcome: Ongoing, Progressing  Flowsheets (Taken 10/22/2020 0018)  Progress: improving  Plan of Care Reviewed With:   patient   significant other  Outcome Summary:   , boy apgars 8/9, formula feeding, no lacerations, fundas is firm at the u with scant rubra bleeding, patient rates pain 10/10, percocet given, 2 bags PPP, afebrile      Goal Outcome Evaluation:  Plan of Care Reviewed With: patient, significant other  Progress: improving  Outcome Summary: , boy apgars 8/9, formula feeding, no lacerations, fundas is firm at the u with scant rubra bleeding, patient rates pain 10/10, percocet given, 2 bags PPP, afebrile;

## 2020-10-22 NOTE — PLAN OF CARE
Goal Outcome Evaluation:  Plan of Care Reviewed With: patient  Progress: no change  Outcome Summary: VSS; FF/ML/UU with light lochia; voiding; ambulating; periods of anxiety et agitation; IV removed; responsive to infant cues

## 2020-10-22 NOTE — PLAN OF CARE
Goal Outcome Evaluation:  Plan of Care Reviewed With: patient, spouse  Progress: improving  Outcome Summary: VSS FF ML U1 light lochia. pain controlled with norco. pt had a shower. Pt takes subutex, klonopin, prozac, and latuda.

## 2020-10-22 NOTE — L&D DELIVERY NOTE
[facility]  Vaginal Delivery Note    Delivery     Delivery: Vaginal, Spontaneous     YOB: 2018    Time of Birth:  Gestational Age 11:00 PM   38w3d     Anesthesia: Epidural     Delivering clinician: Beth Hyatt    Forceps?   No   Vacuum? No    Shoulder dystocia present: No        Delivery narrative:  Progressed to C/C/+2 and pushed well to deliver a LVIM. BISI to LOT vigorous to mom's abdomen. Placenta spontaneous and intact with 3VC after IV Pitocin. Intact perineum. Epidural anes. EBL 300mL. Moving all extremities. No complications. Sponge and needle count correct.        Infant    Findings: male  infant     Infant observations: Weight: No birth weight on file.   Length:   in  Observations/Comments:        Apgars:   @ 1 minute /      @ 5 minutes   Infant Name: Ion     Placenta, Cord, and Fluid    Placenta delivered  Spontaneous  at   10/21/2020 11:06 PM     Cord:   present.   Nuchal Cord?  no   Cord blood obtained: Yes    Cord gases obtained:      Cord gas results: Venous:  No results found for: PHCVEN    Arterial:  No results found for: PHCART     Repair    Episiotomy: None     No    Lacerations: No   Estimated Blood Loss: Est. Blood Loss (mL): 300 mL(Filed from Delivery Summary) (10/21/20 2300)           Complications  none    Disposition  Mother to Remain in LD  in stable condition currently.  Baby to remains with mom  in stable condition currently.      Beth Hyatt DO  10/21/20  23:10 CDT

## 2020-10-22 NOTE — LACTATION NOTE
Mother previously voiced desire to breastfeed to decrease infant withdrawal. Follow up with mother to discuss her decision on infant feeding. Mother states she has decided to exclusively formula feed. Affirmed mother's decision. Provided suppression education packet as well as verbal recommendations for adequately suppression of breastmilk. Mother with difficulty focusing during consultation, may require follow up education. encouragement and support provided.

## 2020-10-22 NOTE — ANESTHESIA POSTPROCEDURE EVALUATION
"Patient: Arin De La Cruz    Procedure Summary     Date: 10/21/20 Room / Location:     Anesthesia Start: 1120 Anesthesia Stop: 2309    Procedure: LABOR ANALGESIA Diagnosis:     Scheduled Providers:  Provider: An Valdivia CRNA    Anesthesia Type: epidural ASA Status: 2          Anesthesia Type: epidural    Vitals  Vitals Value Taken Time   /85 10/22/20 1342   Temp 97.6 °F (36.4 °C) 10/22/20 1342   Pulse 79 10/22/20 1342   Resp 18 10/22/20 1342   SpO2 100 % 10/22/20 1342           Post Anesthesia Care and Evaluation    Patient location during evaluation: floor  Patient participation: complete - patient participated  Level of consciousness: awake and alert  Pain management: adequate  Airway patency: patent  Anesthetic complications: No anesthetic complications    Cardiovascular status: acceptable  Respiratory status: acceptable  Hydration status: acceptable    Comments: Blood pressure 142/85, pulse 79, temperature 97.6 °F (36.4 °C), temperature source Oral, resp. rate 18, height 167.6 cm (66\"), weight 89.4 kg (197 lb), last menstrual period 01/28/2020, SpO2 100 %, not currently breastfeeding.    Pt discharged from PACU based on donell score >8      "

## 2020-10-22 NOTE — SIGNIFICANT NOTE
Iupc zeroed as needed. Monitors repositioned to record. Pt has been turned side to side prn, peanut ball used, and HOB elevated to help accommodate labor. Family members at bedside. Hawk cath emptied with 1400 ml.

## 2020-10-22 NOTE — LACTATION NOTE
"After delivery mom mentioned wanting to breastfeed, but wasn't sure if she would be able with the Subutex.  She wanted to pump and keep the colostrum to feed infant later to help \"wean him off the Subutex\".  To see patient at this time and initiate pumping.  Mom undecided if she wants to do this at this time.  Explained to mom that Subutex and Hepatitis C aren't contraindicated with breastfeeding, but would require her to maintain good hygiene with hand and breast, and monitor breast frequently for any cracks or bleeding.  Explained to patient if she wanted to pump and feed infant breastmilk, it is suggested she start pumping within 6 hours of delivery.  Patient prefers to wait and talk to Pediatrician this a.m. and make a decision based on his recommendation.  "

## 2020-10-22 NOTE — PAYOR COMM NOTE
"Ref: [id number]    [facility]  AMELIA MCKENZIE  [phone]  OR  FAX  [phone]      Jody Brandt (40 y.o. Female)     Date of Birth Social Security Number Address Home Phone MRN    1980  [address] [facility] [address] [phone] [id number]    Buddhist Marital Status          Yarsani Significant Other       Admission Date Admission Type Admitting Provider Attending Provider Department, Room/Bed    10/20/20 Elective Beth Hyatt DO Williamson, Katherine, DO [facility] MOTHER BABY 2A, M266/1    Discharge Date Discharge Disposition Discharge Destination                       Attending Provider: Beth Hyatt DO    Allergies: Ibuprofen    Isolation: None   Infection: None   Code Status: CPR    Ht: 167.6 cm (66\")   Wt: 89.4 kg (197 lb)    Admission Cmt: None   Principal Problem: None                Active Insurance as of 10/20/2020     Primary Coverage     Payor Plan Insurance Group Employer/Plan Group    [place] [id number]     Payor Plan Address Payor Plan Phone Number Payor Plan Fax Number Effective Dates    [employer] Claims Office - [address] [address] [phone]  2020 - None Entered    [facility] [id number]       Subscriber Name Subscriber Birth Date Member ID       JODY BRANDT 1980 [id number]                 Emergency Contacts      (Rel.) Home Phone Work Phone Mobile Phone    Bull Montero (Significant Other) [phone] -- --    Ron Nunez (Other) [phone] -- --      10/21/2020 AT 22:59 PM   VAGINAL DELIVERY     MALE       [id number] GRAMS      APGAR 8/9   EDC     2020  G-4 P-3   38/3 GESTATIONAL AGE        History & Physical      Beth Hyatt DO at 10/21/20 0813        H&P reviewed. The patient was examined and there are no changes to the H&P.   SVE: 1-2 cm   FHR: 120 bpm, mod raza  [place]: occasionl     Will start pitocin at 10 am     Discussed with patient and support persons "     Electronically signed by Beth Hyatt DO at 10/21/20 0816   Source Note          [facility]  Arin De La Cruz  : 1980  MRN: [id number]  CSN: [id number]    History and Physical    Subjective   Arin De La Cruz is a 39 y.o. year old  with an Estimated Date of Delivery: 20 scheduled for induction of labor on 10- due to chronic hypertension on medications .  Prenatal care has been with Dr. Alexus Hyatt.  It has been complicated by trichomoniasis, narcotics maintenance program, AMA (genetic screening was normal), chronic hypertension (on meds) and hepatitis c as well as gestational thrombocytopenia.    OB History    Para Term  AB Living   4 3 0 0 0 3   SAB TAB Ectopic Molar Multiple Live Births   0 0 0 0 0 3      # Outcome Date GA Lbr Mendez/2nd Weight Sex Delivery Anes PTL Lv   4 Current            3 Para 14   3289 g (7 lb 4 oz) M Vag-Spont   ERA   2 Para 07   2948 g (6 lb 8 oz) F Vag-Spont   ERA   1 Para 04   3232 g (7 lb 2 oz) F Vag-Spont   ERA     Past Medical History:   Diagnosis Date   • Anxiety    • Bipolar affective disorder (CMS/HCC)    • Depression    • Hepatitis C    • Kidney infection      Past Surgical History:   Procedure Laterality Date   • APPENDECTOMY     • CYSTOSCOPY W/ URETERAL STENT PLACEMENT Left 10/6/2018    Procedure: CYSTOSCOPY LEFT URETERAL STENT INSERTION;  Surgeon: Tyrone Machuca MD;  Location: [facility] OR;  Service: Urology   • TONSILLECTOMY     • URETEROSCOPY LASER LITHOTRIPSY WITH STENT INSERTION Left 10/17/2018    Procedure: URETEROSCOPY LASER LITHOTRIPSY WITH STENT EXCHANGE STONE EXTRACTION;  Surgeon: Tyrone Machuca MD;  Location: [facility] OR;  Service: Urology       Current Outpatient Medications:   •  aspirin 81 MG tablet, Take 1 tablet by mouth Daily., Disp: 30 tablet, Rfl: 5  •  buprenorphine (SUBUTEX) 8 MG sublingual tablet SL tablet, 8 mg 3 (Three) Times a Day., Disp: , Rfl:   •  clonazePAM (KlonoPIN) 0.5  MG tablet, Take 0.5 mg by mouth., Disp: , Rfl:   •  docusate sodium (Colace) 100 MG capsule, Take 1 capsule by mouth 2 (Two) Times a Day., Disp: 60 capsule, Rfl: 1  •  doxylamine-pyridoxine (DICLEGIS) 10-10 MG tablet delayed-release EC tablet, Take 2 tablets by mouth At Night As Needed (nausea vomiting)., Disp: 30 tablet, Rfl: 0  •  famotidine (PEPCID) 40 MG tablet, Take 0.5 tablets by mouth At Night As Needed for Heartburn., Disp: 60 tablet, Rfl: 2  •  FLUoxetine (PROzac) 40 MG capsule, Take 40 capsules by mouth Daily., Disp: , Rfl:   •  labetalol (NORMODYNE) 100 MG tablet, Take 1 tablet by mouth Daily., Disp: 30 tablet, Rfl: 3  •  LATUDA 60 MG tablet tablet, , Disp: , Rfl:   •  Prenat-Fe Carbonyl-FA-Omega 3 (One-A-Day Womens Prenatal 1) 28-0.8-235 MG capsule, Take 1 tablet by mouth Daily., Disp: 90 capsule, Rfl: 1  •  promethazine (PHENERGAN) 12.5 MG tablet, Take 1 tablet by mouth Every 6 (Six) Hours As Needed for Nausea or Vomiting., Disp: 40 tablet, Rfl: 0  •  valACYclovir (VALTREX) 1000 MG tablet, Take 0.5 tablets by mouth 2 (Two) Times a Day., Disp: 30 tablet, Rfl: 1    No Known Allergies  Social History    Tobacco Use      Smoking status: Current Every Day Smoker        Packs/day: 0.25        Years: 20.00        Pack years: 5        Types: Cigarettes      Smokeless tobacco: Never Used    Review of Systems      Objective   LMP 01/28/2020   General: well developed; well nourished  no acute distress   Heart: Not performed.   Lungs: breathing is unlabored   Abdomen:  Cervix: soft, non-tender; no masses  no umbilical or inguinal hernias are present  no hepato-splenomegaly  was checked (by me): 1 cm / 50 % / -3  EFW 7 pounds  Pelvis seems clinically adequate         Prenatal Labs  Lab Results   Component Value Date    HGB 11.3 10/13/2020    HEPBSAG Negative 04/15/2020    ABORH O Rh Positive 07/11/2014    ABO O 09/02/2020    RH Positive 09/02/2020    ABSCRN Negative 09/02/2020    PFA4AKT2 Non Reactive 04/15/2020     HEPCVIRUSABY >11.0 (H) 04/15/2020    URINECX Final report 04/15/2020       Recent Labs  Lab Results   Component Value Date    HGB 11.3 10/13/2020    HCT 33.7 (L) 10/13/2020    WBC 9.2 10/13/2020     (L) 10/13/2020           Assessment   1. IUP with an Estimated Date of Delivery: 20  2. Induction of labor scheduled on 10- for chronic hypertension on meds   3. Gestational thrombocytopenia   4. Hepatitis C- see most recent viral load   5. Trichomonas treated twice during pregnancy  6. GBS negative   7. Subutex use as well as unable to come off of klonopin.         Plan   1. Risks and benefits of induction discussed.  Patient understands that IOL increases the risk of  delivery over spontaneous labor, especially if the patient does not have a favorable cervix.  2. Discussed with patient previously risk of baby having to go to NICU   3. Will check platelets on admission and consider stress dose of steroids if platelets are low.     Beth Hyatt DO  10/19/2020              Electronically signed by Beth Hyatt DO at 10/19/20 193             Beth Hyatt DO at 10/19/20 192          [facility]  Arin De La Cruz  : 1980  MRN: [id number]  CSN: [id number]    History and Physical    Subjective   Arin De La Cruz is a 39 y.o. year old  with an Estimated Date of Delivery: 20 scheduled for induction of labor on 10- due to chronic hypertension on medications .  Prenatal care has been with Dr. Alexus Hyatt.  It has been complicated by trichomoniasis, narcotics maintenance program, AMA (genetic screening was normal), chronic hypertension (on meds) and hepatitis c as well as gestational thrombocytopenia.    OB History    Para Term  AB Living   4 3 0 0 0 3   SAB TAB Ectopic Molar Multiple Live Births   0 0 0 0 0 3      # Outcome Date GA Lbr Mendez/2nd Weight Sex Delivery Anes PTL Lv   4 Current            3 Para 14   3289 g (7 lb  4 oz) M Vag-Spont   ERA   2 Para 01/21/07   2948 g (6 lb 8 oz) F Vag-Spont   ERA   1 Para 01/12/04   3232 g (7 lb 2 oz) F Vag-Spont   ERA     Past Medical History:   Diagnosis Date   • Anxiety    • Bipolar affective disorder (CMS/HCC)    • Depression    • Hepatitis C    • Kidney infection      Past Surgical History:   Procedure Laterality Date   • APPENDECTOMY     • CYSTOSCOPY W/ URETERAL STENT PLACEMENT Left 10/6/2018    Procedure: CYSTOSCOPY LEFT URETERAL STENT INSERTION;  Surgeon: Tyrone Machuca MD;  Location: [facility] OR;  Service: Urology   • TONSILLECTOMY     • URETEROSCOPY LASER LITHOTRIPSY WITH STENT INSERTION Left 10/17/2018    Procedure: URETEROSCOPY LASER LITHOTRIPSY WITH STENT EXCHANGE STONE EXTRACTION;  Surgeon: Tyrone Machuca MD;  Location: [facility] OR;  Service: Urology       Current Outpatient Medications:   •  aspirin 81 MG tablet, Take 1 tablet by mouth Daily., Disp: 30 tablet, Rfl: 5  •  buprenorphine (SUBUTEX) 8 MG sublingual tablet SL tablet, 8 mg 3 (Three) Times a Day., Disp: , Rfl:   •  clonazePAM (KlonoPIN) 0.5 MG tablet, Take 0.5 mg by mouth., Disp: , Rfl:   •  docusate sodium (Colace) 100 MG capsule, Take 1 capsule by mouth 2 (Two) Times a Day., Disp: 60 capsule, Rfl: 1  •  doxylamine-pyridoxine (DICLEGIS) 10-10 MG tablet delayed-release EC tablet, Take 2 tablets by mouth At Night As Needed (nausea vomiting)., Disp: 30 tablet, Rfl: 0  •  famotidine (PEPCID) 40 MG tablet, Take 0.5 tablets by mouth At Night As Needed for Heartburn., Disp: 60 tablet, Rfl: 2  •  FLUoxetine (PROzac) 40 MG capsule, Take 40 capsules by mouth Daily., Disp: , Rfl:   •  labetalol (NORMODYNE) 100 MG tablet, Take 1 tablet by mouth Daily., Disp: 30 tablet, Rfl: 3  •  LATUDA 60 MG tablet tablet, , Disp: , Rfl:   •  Prenat-Fe Carbonyl-FA-Omega 3 (One-A-Day Womens Prenatal 1) 28-0.8-235 MG capsule, Take 1 tablet by mouth Daily., Disp: 90 capsule, Rfl: 1  •  promethazine (PHENERGAN) 12.5 MG tablet, Take 1 tablet by mouth  Every 6 (Six) Hours As Needed for Nausea or Vomiting., Disp: 40 tablet, Rfl: 0  •  valACYclovir (VALTREX) 1000 MG tablet, Take 0.5 tablets by mouth 2 (Two) Times a Day., Disp: 30 tablet, Rfl: 1    No Known Allergies  Social History    Tobacco Use      Smoking status: Current Every Day Smoker        Packs/day: 0.25        Years: 20.00        Pack years: 5        Types: Cigarettes      Smokeless tobacco: Never Used    Review of Systems      Objective   LMP 2020   General: well developed; well nourished  no acute distress   Heart: Not performed.   Lungs: breathing is unlabored   Abdomen:  Cervix: soft, non-tender; no masses  no umbilical or inguinal hernias are present  no hepato-splenomegaly  was checked (by me): 1 cm / 50 % / -3  EFW 7 pounds  Pelvis seems clinically adequate         Prenatal Labs  Lab Results   Component Value Date    HGB 11.3 10/13/2020    HEPBSAG Negative 04/15/2020    ABORH O Rh Positive 2014    ABO O 2020    RH Positive 2020    ABSCRN Negative 2020    TBS0XAM0 Non Reactive 04/15/2020    HEPCVIRUSABY >11.0 (H) 04/15/2020    URINECX Final report 04/15/2020       Recent Labs  Lab Results   Component Value Date    HGB 11.3 10/13/2020    HCT 33.7 (L) 10/13/2020    WBC 9.2 10/13/2020     (L) 10/13/2020           Assessment   8. IUP with an Estimated Date of Delivery: 20  9. Induction of labor scheduled on 10- for chronic hypertension on meds   10. Gestational thrombocytopenia   11. Hepatitis C- see most recent viral load   12. Trichomonas treated twice during pregnancy  13. GBS negative   14. Subutex use as well as unable to come off of klonopin.         Plan   4. Risks and benefits of induction discussed.  Patient understands that IOL increases the risk of  delivery over spontaneous labor, especially if the patient does not have a favorable cervix.  5. Discussed with patient previously risk of baby having to go to NICU   6. Will check platelets  on admission and consider stress dose of steroids if platelets are low.     Beth Hyatt DO  10/19/2020              Electronically signed by Beth Hyatt DO at 10/19/20 1931          Operative/Procedure Notes (last 72 hours) (Notes from 10/19/20 1055 through 10/22/20 1055)      Beth Hyatt DO at 10/21/20 2310          [facility]  Vaginal Delivery Note    Delivery     Delivery: Vaginal, Spontaneous     YOB: 2018    Time of Birth:  Gestational Age 11:00 PM   38w3d     Anesthesia: Epidural     Delivering clinician: Beth Hyatt    Forceps?   No   Vacuum? No    Shoulder dystocia present: No        Delivery narrative:  Progressed to C/C/+2 and pushed well to deliver a LVIM. BISI to LOT vigorous to mom's abdomen. Placenta spontaneous and intact with 3VC after IV Pitocin. Intact perineum. Epidural anes. EBL 300mL. Moving all extremities. No complications. Sponge and needle count correct.        Infant    Findings: male  infant     Infant observations: Weight: No birth weight on file.   Length:   in  Observations/Comments:        Apgars:   @ 1 minute /      @ 5 minutes   Infant Name: Ion     Placenta, Cord, and Fluid    Placenta delivered  Spontaneous  at   10/21/2020 11:06 PM     Cord:   present.   Nuchal Cord?  no   Cord blood obtained: Yes    Cord gases obtained:      Cord gas results: Venous:  No results found for: PHCVEN    Arterial:  No results found for: PHCART     Repair    Episiotomy: None     No    Lacerations: No   Estimated Blood Loss: Est. Blood Loss (mL): 300 mL(Filed from Delivery Summary) (10/21/20 2300)           Complications  none    Disposition  Mother to Remain in LD  in stable condition currently.  Baby to remains with mom  in stable condition currently.      Beth Hyatt DO  10/21/20  23:10 CDT        Electronically signed by Beth Hyatt DO at 10/21/20 6415

## 2020-10-23 VITALS
TEMPERATURE: 97.6 F | HEART RATE: 83 BPM | OXYGEN SATURATION: 99 % | BODY MASS INDEX: 31.66 KG/M2 | HEIGHT: 66 IN | SYSTOLIC BLOOD PRESSURE: 115 MMHG | RESPIRATION RATE: 18 BRPM | DIASTOLIC BLOOD PRESSURE: 62 MMHG | WEIGHT: 197 LBS

## 2020-10-23 LAB — HGB F BLD QL KLEIH BETKE: NORMAL

## 2020-10-23 PROCEDURE — 85460 HEMOGLOBIN FETAL: CPT | Performed by: OBSTETRICS & GYNECOLOGY

## 2020-10-23 RX ORDER — HYDROCODONE BITARTRATE AND ACETAMINOPHEN 7.5; 325 MG/1; MG/1
1 TABLET ORAL EVERY 4 HOURS PRN
Qty: 6 TABLET | Refills: 0 | Status: SHIPPED | OUTPATIENT
Start: 2020-10-23 | End: 2020-11-01

## 2020-10-23 RX ADMIN — IBUPROFEN 600 MG: 600 TABLET, FILM COATED ORAL at 02:20

## 2020-10-23 RX ADMIN — HYDROCODONE BITARTRATE AND ACETAMINOPHEN 1 TABLET: 7.5; 325 TABLET ORAL at 02:20

## 2020-10-23 RX ADMIN — CLONAZEPAM 0.5 MG: 0.5 TABLET ORAL at 05:15

## 2020-10-23 RX ADMIN — HYDROCODONE BITARTRATE AND ACETAMINOPHEN 1 TABLET: 7.5; 325 TABLET ORAL at 08:31

## 2020-10-23 RX ADMIN — PRENATAL VIT W/ FE FUMARATE-FA TAB 27-0.8 MG 1 TABLET: 27-0.8 TAB at 08:31

## 2020-10-23 RX ADMIN — FLUOXETINE HYDROCHLORIDE 40 MG: 20 CAPSULE ORAL at 08:32

## 2020-10-23 RX ADMIN — DOCUSATE SODIUM 100 MG: 100 CAPSULE ORAL at 08:32

## 2020-10-23 RX ADMIN — LABETALOL HYDROCHLORIDE 100 MG: 100 TABLET, FILM COATED ORAL at 08:32

## 2020-10-23 RX ADMIN — BUPRENORPHINE 6 MG: 2 TABLET SUBLINGUAL at 18:06

## 2020-10-23 RX ADMIN — HYDROCODONE BITARTRATE AND ACETAMINOPHEN 1 TABLET: 7.5; 325 TABLET ORAL at 18:05

## 2020-10-23 RX ADMIN — HYDROCODONE BITARTRATE AND ACETAMINOPHEN 1 TABLET: 7.5; 325 TABLET ORAL at 14:12

## 2020-10-23 RX ADMIN — BUPRENORPHINE 8 MG: 8 TABLET SUBLINGUAL at 05:14

## 2020-10-23 RX ADMIN — BUPRENORPHINE 8 MG: 8 TABLET SUBLINGUAL at 14:12

## 2020-10-23 RX ADMIN — CLONAZEPAM 0.5 MG: 0.5 TABLET ORAL at 14:11

## 2020-10-23 RX ADMIN — IBUPROFEN 600 MG: 600 TABLET, FILM COATED ORAL at 14:12

## 2020-10-23 NOTE — PROGRESS NOTES
Continued Stay Note  ONESIMO Mills     Patient Name: Arin De La Cruz  MRN: [id number]  Today's Date: 10/23/2020    Admit Date: 10/20/2020    Discharge Plan     Row Name 10/23/20 0847       Plan    Plan Comments  Mom positive for benzo and buprenorphine. Baby positive for benzo, buprenorphine, and methamphetamine. Mom has script for klonopin which in result in mom and baby positive for benzos. Mom has script for subutex which in result mom and baby positive for buprenorphine. Mom was given ephedrine during delivery which is result baby is positive for methamphetamines. SW did make a report to CPS in regards to all meds mom is on to look up past history. Intake ID: [id number] SW will call back this afternoon to determine if report has been accepted for investigation at this time. Please notify RUPAL if any concerns arise.        Discharge Codes    No documentation.             Kristie Pinto

## 2020-10-23 NOTE — PROGRESS NOTES
Continued Stay Note   Lina     Patient Name: Arin De La Cruz  MRN: [id number]  Today's Date: 10/23/2020    Admit Date: 10/20/2020    Discharge Plan     Row Name 10/23/20 1514       Plan    Plan Comments  NOTE COPIED AND PASTED FROM BABY CHART--RUPAL spoke with centralized intake who states that report did make investigation. RUPAL spoke with JAMIR Bailey who is aware that SW is unknown when [facility] CPS will be here to visit. SW will follow for phone call/visit from [facility].        Discharge Codes    No documentation.       Expected Discharge Date and Time     Expected Discharge Date Expected Discharge Time    Oct 23, 2020             Kristie Pinto

## 2020-10-23 NOTE — PROGRESS NOTES
"[facility]  Vaginal Delivery Progress Note    Subjective   Postpartum Day 2: Vaginal Delivery    The patient feels well.  Her pain is well controlled with nonsteroidal anti-inflammatory drugs and opioid analgesics.   She is ambulating well.  Patient describes her bleeding as moderate lochia.    Breastfeeding: declines.    Objective     Vital Signs Range for the last 24 hours  Temperature: Temp:  [97.5 °F (36.4 °C)-97.8 °F (36.6 °C)] 97.5 °F (36.4 °C)   Temp Source: Temp src: Temporal   BP: BP: (132-142)/(70-87) 133/70   Pulse: Heart Rate:  [70-85] 70   Respirations: Resp:  [17-18] 18   SPO2: SpO2:  [98 %-100 %] 99 %   O2 Amount (l/min):     O2 Devices Device (Oxygen Therapy): room air   Weight:       Admit Height:  Height: 167.6 cm (66\")      Physical Exam:  General:  no acute distresss.  Abdomen: abdomen is soft without significant tenderness, masses, organomegaly or guarding. Fundus: appropriate, firm, non tender  Extremities: normal, atraumatic, no cyanosis, and trace edema.     Lab results reviewed:  Yes   Rubella:  No results found for: RUBELLAIGGIN Nurse Transcribed from prenatal record --  No components found for: EXTRUBELQUAL  Rh Status:    RH type   Date Value Ref Range Status   10/20/2020 Positive  Final     Immunizations:   Immunization History   Administered Date(s) Administered   • Tdap 08/12/2020     Lab Results (last 24 hours)     Procedure Component Value Units Date/Time    Tissue Pathology Exam [[id number]] Collected: 10/22/20 0520    Specimen: Tissue from Placenta Updated: 10/22/20 1123    Extra Tubes [[id number]] Collected: 10/22/20 0535    Specimen: Blood, Venous Line Updated: 10/22/20 0816    Narrative:      The following orders were created for panel order Extra Tubes.  Procedure                               Abnormality         Status                     ---------                               -----------         ------                     Green Top (Gel)[[id number]]                               "    Final result                 Please view results for these tests on the individual orders.    Green Top (Gel) [[id number]] Collected: 10/22/20 0535    Specimen: Blood Updated: 10/22/20 0816     Extra Tube Hold for add-ons.     Comment: Auto resulted.             Assessment/Plan       Chronic hypertension affecting pregnancy      Arin De La Cruz is Day 2  post-partum  Vaginal, Spontaneous  none .      Plan:  Discharge home with standard precautions and return to clinic in 4-6 weeks.      Beth Hyatt DO  10/23/2020  08:05 CDT

## 2020-10-23 NOTE — NURSING NOTE
Contacted Dr. Bob.  Pt is agitated et anxious. Reported to him that pt has rx for klonopin 0.5 mg twice a day as needed for anxiety. Took a dose at 0836. Was upset that RN had not brought second dose just prior to 1400.  States she takes them regularly at home at about 0600 and between 8458-6276. I explained to her that it is PRN med and not scheduled. This RN had already discussed with pharmacist about pt concerns of having subutex schedule different from home schedule et Pharmacy then adjusted the times of her subtex to reflect her home schedule.  This RN noted pt is now still very anxious and agitated over the klonopin schedule.  Pt is also concerned as the baby is not eating well, she is worried about the baby withdrawing, and upset that she cannot have other visitors.  Order received to change klonopin at this time to Klonopin 0.5 mg po BID at 0600 et 1500 while admitted.  Order verified et entered.

## 2020-10-23 NOTE — DISCHARGE SUMMARY
Discharge Summary     Lina De La Cruz  : 1980  MRN: [id number]  [facility]: [id number]    Date of Admission: 10/20/2020   Date of Discharge:  10/23/2020   Delivering Physician: Beth Hyatt        Admission Diagnosis: 1. Chronic hypertension affecting pregnancy [O10.919]   Discharge Diagnosis: 1. Pregnancy at 38w3d - delivered       Procedures: 10/21/2020  - Vaginal, Spontaneous       Hospital Course  Patient is a 40 y.o.  who at 38w3d had a vaginal birth.  Her postpartum course was without complications.  On PPD #2 she was ready for discharge.  She had normal lochia and pain well controlled with oral medications.    Infant  male  fetus weighing 2710 g (5 lb 15.6 oz)   Apgars -  8 @ 1 minute /  9 @ 5 minutes.    Discharge labs  Lab Results   Component Value Date    WBC 10.18 10/22/2020    HGB 10.2 (L) 10/22/2020    HCT 31.1 (L) 10/22/2020     (L) 10/22/2020       Discharge Medications     Discharge Medications      New Medications      Instructions Start Date   HYDROcodone-acetaminophen 7.5-325 MG per tablet  Commonly known as: NORCO   1 tablet, Oral, Every 4 Hours PRN         Continue These Medications      Instructions Start Date   aspirin 81 MG tablet   81 mg, Oral, Daily      buprenorphine 8 MG sublingual tablet SL tablet  Commonly known as: SUBUTEX   8 mg, 3 Times Daily      clonazePAM 0.5 MG tablet  Commonly known as: KlonoPIN   0.5 mg, Oral, 2 Times Daily PRN      docusate sodium 100 MG capsule  Commonly known as: Colace   100 mg, Oral, 2 Times Daily      doxylamine-pyridoxine 10-10 MG tablet delayed-release EC tablet  Commonly known as: DICLEGIS   2 tablets, Oral, Nightly PRN      famotidine 40 MG tablet  Commonly known as: PEPCID   20 mg, Oral, Nightly PRN      FLUoxetine 40 MG capsule  Commonly known as: PROzac   40 capsules, Oral, Daily      labetalol 100 MG tablet  Commonly known as: NORMODYNE   100 mg, Oral, Daily      Latuda 60 MG tablet tablet  Generic drug:  lurasidone HCl   No dose, route, or frequency recorded.      One-A-Day Womens Prenatal 1 28-0.8-235 MG capsule   1 tablet, Oral, Daily      promethazine 12.5 MG tablet  Commonly known as: PHENERGAN   12.5 mg, Oral, Every 6 Hours PRN      valACYclovir 1000 MG tablet  Commonly known as: VALTREX   500 mg, Oral, 2 Times Daily             Discharge Disposition Home or Self Care   Condition on Discharge: good   Follow-up: 4 weeks with Olena Hyatt DO  10/23/2020

## 2020-10-24 LAB — BENZODIAZ UR QL: POSITIVE

## 2020-10-25 LAB
BUPRENORPHINE UR CFM-MCNC: 123 NG/ML
BUPRENORPHINE UR QL CFM: POSITIVE
BUPRENORPHINE+NOR UR QL: POSITIVE
NORBUPRENORPHINE UR CFM-MCNC: 1288 NG/ML
NORBUPRENORPHINE UR QL CFM: POSITIVE

## 2020-10-29 LAB
CYTO UR: NORMAL
LAB AP CASE REPORT: NORMAL
LAB AP CLINICAL INFORMATION: NORMAL
PATH REPORT.FINAL DX SPEC: NORMAL
PATH REPORT.GROSS SPEC: NORMAL

## 2021-02-26 PROCEDURE — U0004 COV-19 TEST NON-CDC HGH THRU: HCPCS | Performed by: NURSE PRACTITIONER

## 2023-01-09 ENCOUNTER — HOSPITAL ENCOUNTER (EMERGENCY)
Age: 43
Discharge: HOME | End: 2023-01-10
Attending: EMERGENCY MEDICINE
Payer: MEDICAID

## 2023-01-09 DIAGNOSIS — F19.10 POLYSUBSTANCE ABUSE (HCC): Primary | ICD-10-CM

## 2023-01-09 LAB
ACETAMINOPHEN LEVEL: <5 UG/ML (ref 10–30)
ALBUMIN SERPL-MCNC: 4.1 G/DL (ref 3.5–5.2)
ALP BLD-CCNC: 83 U/L (ref 35–104)
ALT SERPL-CCNC: 34 U/L (ref 5–33)
AMPHETAMINE SCREEN, URINE: POSITIVE
ANION GAP SERPL CALCULATED.3IONS-SCNC: 11 MMOL/L (ref 7–19)
AST SERPL-CCNC: 32 U/L (ref 5–32)
BARBITURATE SCREEN URINE: NEGATIVE
BASOPHILS ABSOLUTE: 0 K/UL (ref 0–0.2)
BASOPHILS RELATIVE PERCENT: 0.4 % (ref 0–1)
BENZODIAZEPINE SCREEN, URINE: POSITIVE
BILIRUB SERPL-MCNC: 0.3 MG/DL (ref 0.2–1.2)
BUN BLDV-MCNC: 5 MG/DL (ref 6–20)
BUPRENORPHINE URINE: POSITIVE
CALCIUM SERPL-MCNC: 9.5 MG/DL (ref 8.6–10)
CANNABINOID SCREEN URINE: POSITIVE
CHLORIDE BLD-SCNC: 106 MMOL/L (ref 98–111)
CO2: 26 MMOL/L (ref 22–29)
COCAINE METABOLITE SCREEN URINE: NEGATIVE
CREAT SERPL-MCNC: 0.6 MG/DL (ref 0.5–0.9)
EOSINOPHILS ABSOLUTE: 0.4 K/UL (ref 0–0.6)
EOSINOPHILS RELATIVE PERCENT: 6.5 % (ref 0–5)
ETHANOL: <10 MG/DL (ref 0–0.08)
GFR SERPL CREATININE-BSD FRML MDRD: >60 ML/MIN/{1.73_M2}
GLUCOSE BLD-MCNC: 83 MG/DL (ref 74–109)
HCG QUALITATIVE: NEGATIVE
HCT VFR BLD CALC: 43.9 % (ref 37–47)
HEMOGLOBIN: 14.4 G/DL (ref 12–16)
IMMATURE GRANULOCYTES #: 0 K/UL
LYMPHOCYTES ABSOLUTE: 1.8 K/UL (ref 1.1–4.5)
LYMPHOCYTES RELATIVE PERCENT: 31.2 % (ref 20–40)
Lab: ABNORMAL
MCH RBC QN AUTO: 32.1 PG (ref 27–31)
MCHC RBC AUTO-ENTMCNC: 32.8 G/DL (ref 33–37)
MCV RBC AUTO: 98 FL (ref 81–99)
METHADONE SCREEN, URINE: NEGATIVE
METHAMPHETAMINE, URINE: POSITIVE
MONOCYTES ABSOLUTE: 0.4 K/UL (ref 0–0.9)
MONOCYTES RELATIVE PERCENT: 6.9 % (ref 0–10)
NEUTROPHILS ABSOLUTE: 3.1 K/UL (ref 1.5–7.5)
NEUTROPHILS RELATIVE PERCENT: 54.6 % (ref 50–65)
OPIATE SCREEN URINE: NEGATIVE
OXYCODONE URINE: NEGATIVE
PDW BLD-RTO: 12.3 % (ref 11.5–14.5)
PHENCYCLIDINE SCREEN URINE: NEGATIVE
PLATELET # BLD: 195 K/UL (ref 130–400)
PMV BLD AUTO: 9.7 FL (ref 9.4–12.3)
POTASSIUM SERPL-SCNC: 4.2 MMOL/L (ref 3.5–5)
PROPOXYPHENE SCREEN: NEGATIVE
RBC # BLD: 4.48 M/UL (ref 4.2–5.4)
SALICYLATE, SERUM: <0.3 MG/DL (ref 3–10)
SARS-COV-2, NAAT: NOT DETECTED
SODIUM BLD-SCNC: 143 MMOL/L (ref 136–145)
TOTAL PROTEIN: 6.8 G/DL (ref 6.6–8.7)
TRICYCLIC, URINE: NEGATIVE
WBC # BLD: 5.7 K/UL (ref 4.8–10.8)

## 2023-01-09 PROCEDURE — 84703 CHORIONIC GONADOTROPIN ASSAY: CPT

## 2023-01-09 PROCEDURE — 99284 EMERGENCY DEPT VISIT MOD MDM: CPT

## 2023-01-09 PROCEDURE — 96375 TX/PRO/DX INJ NEW DRUG ADDON: CPT

## 2023-01-09 PROCEDURE — 96376 TX/PRO/DX INJ SAME DRUG ADON: CPT

## 2023-01-09 PROCEDURE — 6360000002 HC RX W HCPCS: Performed by: EMERGENCY MEDICINE

## 2023-01-09 PROCEDURE — 82077 ASSAY SPEC XCP UR&BREATH IA: CPT

## 2023-01-09 PROCEDURE — 80053 COMPREHEN METABOLIC PANEL: CPT

## 2023-01-09 PROCEDURE — 87635 SARS-COV-2 COVID-19 AMP PRB: CPT

## 2023-01-09 PROCEDURE — 96374 THER/PROPH/DIAG INJ IV PUSH: CPT

## 2023-01-09 PROCEDURE — 80179 DRUG ASSAY SALICYLATE: CPT

## 2023-01-09 PROCEDURE — 80306 DRUG TEST PRSMV INSTRMNT: CPT

## 2023-01-09 PROCEDURE — 2580000003 HC RX 258: Performed by: EMERGENCY MEDICINE

## 2023-01-09 PROCEDURE — 80143 DRUG ASSAY ACETAMINOPHEN: CPT

## 2023-01-09 PROCEDURE — 36415 COLL VENOUS BLD VENIPUNCTURE: CPT

## 2023-01-09 PROCEDURE — 85025 COMPLETE CBC W/AUTO DIFF WBC: CPT

## 2023-01-09 RX ORDER — SODIUM CHLORIDE, SODIUM LACTATE, POTASSIUM CHLORIDE, AND CALCIUM CHLORIDE .6; .31; .03; .02 G/100ML; G/100ML; G/100ML; G/100ML
1000 INJECTION, SOLUTION INTRAVENOUS ONCE
Status: COMPLETED | OUTPATIENT
Start: 2023-01-09 | End: 2023-01-09

## 2023-01-09 RX ORDER — FLUOXETINE HYDROCHLORIDE 20 MG/1
40 CAPSULE ORAL DAILY
Status: DISCONTINUED | OUTPATIENT
Start: 2023-01-10 | End: 2023-01-10 | Stop reason: HOSPADM

## 2023-01-09 RX ORDER — BUPRENORPHINE HYDROCHLORIDE AND NALOXONE HYDROCHLORIDE DIHYDRATE 8; 2 MG/1; MG/1
1 TABLET SUBLINGUAL ONCE
Status: COMPLETED | OUTPATIENT
Start: 2023-01-09 | End: 2023-01-09

## 2023-01-09 RX ORDER — ONDANSETRON 2 MG/ML
4 INJECTION INTRAMUSCULAR; INTRAVENOUS ONCE
Status: COMPLETED | OUTPATIENT
Start: 2023-01-09 | End: 2023-01-09

## 2023-01-09 RX ORDER — LORAZEPAM 2 MG/ML
2 INJECTION INTRAMUSCULAR ONCE
Status: COMPLETED | OUTPATIENT
Start: 2023-01-09 | End: 2023-01-09

## 2023-01-09 RX ADMIN — LORAZEPAM 2 MG: 2 INJECTION INTRAMUSCULAR; INTRAVENOUS at 21:31

## 2023-01-09 RX ADMIN — ONDANSETRON 4 MG: 2 INJECTION INTRAMUSCULAR; INTRAVENOUS at 21:30

## 2023-01-09 RX ADMIN — SODIUM CHLORIDE, POTASSIUM CHLORIDE, SODIUM LACTATE AND CALCIUM CHLORIDE 1000 ML: 600; 310; 30; 20 INJECTION, SOLUTION INTRAVENOUS at 21:33

## 2023-01-09 RX ADMIN — BUPRENORPHINE AND NALOXONE 1 TABLET: 8; 2 TABLET SUBLINGUAL at 21:40

## 2023-01-09 ASSESSMENT — PAIN DESCRIPTION - DESCRIPTORS: DESCRIPTORS: ACHING

## 2023-01-09 ASSESSMENT — PAIN DESCRIPTION - FREQUENCY: FREQUENCY: CONTINUOUS

## 2023-01-09 ASSESSMENT — PAIN - FUNCTIONAL ASSESSMENT: PAIN_FUNCTIONAL_ASSESSMENT: 0-10

## 2023-01-09 ASSESSMENT — PAIN DESCRIPTION - PAIN TYPE: TYPE: ACUTE PAIN

## 2023-01-09 ASSESSMENT — LIFESTYLE VARIABLES: HOW OFTEN DO YOU HAVE A DRINK CONTAINING ALCOHOL: MONTHLY OR LESS

## 2023-01-09 ASSESSMENT — PAIN DESCRIPTION - LOCATION: LOCATION: GENERALIZED

## 2023-01-09 ASSESSMENT — PAIN SCALES - GENERAL: PAINLEVEL_OUTOF10: 10

## 2023-01-10 VITALS
SYSTOLIC BLOOD PRESSURE: 108 MMHG | HEART RATE: 67 BPM | TEMPERATURE: 99 F | RESPIRATION RATE: 17 BRPM | HEIGHT: 66 IN | DIASTOLIC BLOOD PRESSURE: 78 MMHG | WEIGHT: 165 LBS | OXYGEN SATURATION: 96 % | BODY MASS INDEX: 26.52 KG/M2

## 2023-01-10 PROCEDURE — 6370000000 HC RX 637 (ALT 250 FOR IP): Performed by: EMERGENCY MEDICINE

## 2023-01-10 PROCEDURE — 6360000002 HC RX W HCPCS: Performed by: EMERGENCY MEDICINE

## 2023-01-10 RX ORDER — ONDANSETRON 2 MG/ML
4 INJECTION INTRAMUSCULAR; INTRAVENOUS ONCE
Status: COMPLETED | OUTPATIENT
Start: 2023-01-10 | End: 2023-01-10

## 2023-01-10 RX ADMIN — IBUPROFEN 600 MG: 400 TABLET, FILM COATED ORAL at 00:09

## 2023-01-10 RX ADMIN — FLUOXETINE HYDROCHLORIDE 40 MG: 20 CAPSULE ORAL at 09:25

## 2023-01-10 RX ADMIN — ONDANSETRON 4 MG: 2 INJECTION INTRAMUSCULAR; INTRAVENOUS at 00:09

## 2023-01-10 ASSESSMENT — ENCOUNTER SYMPTOMS
COUGH: 0
SHORTNESS OF BREATH: 0
ABDOMINAL PAIN: 0
VOMITING: 0
NAUSEA: 0

## 2023-01-10 NOTE — ED NOTES
Pt was brought to this facility by SO. [facility] and told SO that she wanted help with her addiction. Pt stated if they released her she would use drugs again tonight and stated that she did not want to do that. SO stated they have resources for the pt but can not get in touch with detox resources until tomorrow. SO brought patient here for concern of withdrawals.       Monae Iglesias RN  01/09/23 2933

## 2023-01-10 NOTE — ED NOTES
Spoke with SO.   They will contact their associated facilities for availability and will contact with yomaira Keys RN  01/10/23 5779

## 2023-01-10 NOTE — ED NOTES
[facility]. States they are supposed to have bed available for PT. Call dispatch after 0800.       [facility]  01/10/23 6510

## 2023-01-10 NOTE — ED PROVIDER NOTES
[facility] EMERGENCY DEPT  eMERGENCY dEPARTMENT eNCOUnter      Pt Name: Radha Maynard  MRN: [id number]  Birthdate 1980  Date of evaluation: 1/9/2023  Provider: Angelo Farmer MD    CHIEF COMPLAINT       Chief Complaint   Patient presents with    Addiction Problem     Pt was released from custodial today. Brought here by SO for help with withdrawal symptoms before going to a detox facility.          HISTORY OF PRESENT ILLNESS   (Location/Symptom, Timing/Onset,Context/Setting, Quality, Duration, Modifying Factors, Severity)  Note limiting factors.   Radha Maynard is a 42 y.o. female who presents to the emergency department for evaluation for withdrawal symptoms after she was released from custodial.  Patient states that she is interested in going to a detox facility.  States that she has been using fentanyl.  She arrived here to the ED via the [facility]'s department deputy.  Patient states she is not experiencing any chest pain, palpitations or feelings of shortness of breath.  She denies any auditory or visual hallucinations.  She is not having any thoughts about wanting to harm her self.    HPI    NursingNotes were reviewed.    REVIEW OF SYSTEMS    (2-9 systems for level 4, 10 or more for level 5)     Review of Systems   Constitutional:  Negative for chills and fever.   Respiratory:  Negative for cough and shortness of breath.    Cardiovascular:  Negative for chest pain.   Gastrointestinal:  Negative for abdominal pain, nausea and vomiting.   Neurological:  Negative for syncope.   All other systems reviewed and are negative.         PAST MEDICALHISTORY     Past Medical History:   Diagnosis Date    Bipolar disorder (HCC)     Depression     Liver disease     Hep C    Neuropathy          SURGICAL HISTORY       Past Surgical History:   Procedure Laterality Date    APPENDECTOMY           CURRENT MEDICATIONS     Previous Medications    BUPRENORPHINE HCL-NALOXONE HCL (SUBOXONE SL)    Place under the tongue    CLONAZEPAM (KLONOPIN)  0.5 MG TABLET    Take 0.5 mg by mouth 3 times daily as needed for Anxiety. ERGOCALCIFEROL (ERGOCALCIFEROL) 62363 UNITS CAPSULE    Take 1 capsule by mouth once a week for 12 doses    FLUOXETINE (PROZAC) 40 MG CAPSULE        GABAPENTIN (NEURONTIN) 600 MG TABLET        LAMOTRIGINE (LAMICTAL) 100 MG TABLET        LISINOPRIL (PRINIVIL;ZESTRIL) 10 MG TABLET    Take 10 mg by mouth daily    LURASIDONE (LATUDA) 40 MG TABS TABLET    Take by mouth daily       ALLERGIES     Patient has no known allergies. FAMILY HISTORY       Family History   Problem Relation Age of Onset    Liver Disease Mother         Hep C    Colon Cancer Neg Hx     Colon Polyps Neg Hx     Esophageal Cancer Neg Hx     Liver Cancer Neg Hx     Rectal Cancer Neg Hx     Stomach Cancer Neg Hx           SOCIAL HISTORY       Social History     Socioeconomic History    Marital status:      Spouse name: None    Number of children: None    Years of education: None    Highest education level: None   Tobacco Use    Smoking status: Every Day     Packs/day: 1.00     Years: 18.00     Pack years: 18.00     Types: Cigarettes    Smokeless tobacco: Never   Vaping Use    Vaping Use: Some days   Substance and Sexual Activity    Alcohol use: No    Drug use: Yes     Types: Marijuana (Weed), Cocaine, IV, Methamphetamines (Crystal Meth)     Comment: Lasted used 11/12/2014- Completed Rehab ([facility]) 11/16/2015. SCREENINGS    [place]riot Coma Scale  Eye Opening: Spontaneous  Best Verbal Response: Oriented  Best Motor Response: Obeys commands  [place]riot Coma Scale Score: 15        PHYSICAL EXAM    (up to 7 for level 4, 8 or more for level 5)     ED Triage Vitals [01/09/23 1821]   BP Temp Temp Source Heart Rate Resp SpO2 Height Weight   (!) 149/99 99 °F (37.2 °C) Oral 74 18 96 % 5' 6\" (1.676 m) 165 lb (74.8 kg)       Physical Exam  Vitals and nursing note reviewed. HENT:      Head: Atraumatic. Mouth/Throat:      Mouth: Mucous membranes are not dry.    Eyes: Pupils: Pupils are equal, round, and reactive to light. Neck:      Trachea: No tracheal deviation. Cardiovascular:      Rate and Rhythm: Normal rate and regular rhythm. Heart sounds: Normal heart sounds. No murmur heard. Pulmonary:      Effort: Pulmonary effort is normal. No respiratory distress. Breath sounds: Normal breath sounds. No stridor. Abdominal:      General: There is no distension. Palpations: Abdomen is soft. Tenderness: There is no abdominal tenderness. There is no guarding. Musculoskeletal:      Right lower leg: No edema. Left lower leg: No edema. Skin:     Coloration: Skin is not pale. Findings: No rash. Neurological:      Mental Status: She is alert and oriented to person, place, and time. Psychiatric:         Behavior: Behavior is cooperative.        DIAGNOSTIC RESULTS         LABS:  Labs Reviewed   CBC WITH AUTO DIFFERENTIAL - Abnormal; Notable for the following components:       Result Value    MCH 32.1 (*)     MCHC 32.8 (*)     Eosinophils % 6.5 (*)     All other components within normal limits   COMPREHENSIVE METABOLIC PANEL - Abnormal; Notable for the following components:    BUN 5 (*)     ALT 34 (*)     All other components within normal limits   DRUG SCRN, BUPRENORPHINE - Abnormal; Notable for the following components:    Amphetamine Screen, Urine POSITIVE (*)     Benzodiazepine Screen, Urine POSITIVE (*)     Cannabinoid Scrn, Ur POSITIVE (*)     Buprenorphine Urine POSITIVE (*)     Methamphetamine, Urine POSITIVE (*)     All other components within normal limits   ACETAMINOPHEN LEVEL - Abnormal; Notable for the following components:    Acetaminophen Level <5 (*)     All other components within normal limits   SALICYLATE LEVEL - Abnormal; Notable for the following components:    Salicylate, Serum <8.3 (*)     All other components within normal limits   COVID-19, RAPID   ETHANOL   HCG, SERUM, QUALITATIVE       All other labs were within normal range or not returned as of this dictation. EMERGENCY DEPARTMENT COURSE and DIFFERENTIAL DIAGNOSIS/MDM:   Vitals:    Vitals:    01/09/23 2045 01/09/23 2215 01/09/23 2319 01/10/23 0513   BP: 129/76 122/76 117/69 108/78   Pulse: 72 75 71 67   Resp: 18 15 20 17   Temp:       TempSrc:       SpO2: 93% 94% 93% 96%   Weight:       Height:           MDM    Patient's vital signs are stable. She is not exhibiting any symptoms of acute withdrawal at this time. We will plan to observe her here in the emergency department with plans for social work evaluation tomorrow morning for assistance with detox placement. 0700: ED care transferred to Dr. Tracie Booker - we have discussed plan of care. PROCEDURES:  Unless otherwise noted below, none     Procedures    FINAL IMPRESSION      1.  Polysubstance abuse ([address]          DISPOSITION/PLAN   DISPOSITION  Pending        (Please note that portions of this note were completed with a voice recognition program.  Efforts were made to edit thedictations but occasionally words are mis-transcribed.)    Michelle Sahni MD (electronically signed)  Attending Emergency Physician          Michelle Sahni MD  01/10/23 6116

## 2023-02-10 ENCOUNTER — APPOINTMENT (OUTPATIENT)
Dept: CT IMAGING | Facility: HOSPITAL | Age: 43
End: 2023-02-10
Payer: MEDICAID

## 2023-02-10 ENCOUNTER — HOSPITAL ENCOUNTER (OUTPATIENT)
Facility: HOSPITAL | Age: 43
Setting detail: OBSERVATION
Discharge: HOME | End: 2023-02-11
Attending: SPECIALIST | Admitting: SPECIALIST
Payer: MEDICAID

## 2023-02-10 DIAGNOSIS — R79.89 ELEVATED LFTS: ICD-10-CM

## 2023-02-10 DIAGNOSIS — K42.9 HERNIA, UMBILICAL: ICD-10-CM

## 2023-02-10 DIAGNOSIS — K42.0 INCARCERATED UMBILICAL HERNIA: Primary | ICD-10-CM

## 2023-02-10 LAB
ALBUMIN SERPL-MCNC: 4.4 G/DL (ref 3.5–5.2)
ALBUMIN/GLOB SERPL: 1.5 G/DL
ALP SERPL-CCNC: 122 U/L (ref 39–117)
ALT SERPL W P-5'-P-CCNC: 152 U/L (ref 1–33)
ANION GAP SERPL CALCULATED.3IONS-SCNC: 13 MMOL/L (ref 5–15)
APAP SERPL-MCNC: <5 MCG/ML (ref 0–30)
AST SERPL-CCNC: 121 U/L (ref 1–32)
BACTERIA UR QL AUTO: ABNORMAL /HPF
BASOPHILS # BLD AUTO: 0.05 10*3/MM3 (ref 0–0.2)
BASOPHILS NFR BLD AUTO: 0.6 % (ref 0–1.5)
BILIRUB SERPL-MCNC: 0.3 MG/DL (ref 0–1.2)
BILIRUB UR QL STRIP: NEGATIVE
BUN SERPL-MCNC: 7 MG/DL (ref 6–20)
BUN/CREAT SERPL: 9.7 (ref 7–25)
CALCIUM SPEC-SCNC: 9.3 MG/DL (ref 8.6–10.5)
CHLORIDE SERPL-SCNC: 100 MMOL/L (ref 98–107)
CLARITY UR: CLEAR
CO2 SERPL-SCNC: 26 MMOL/L (ref 22–29)
COLOR UR: YELLOW
CREAT SERPL-MCNC: 0.72 MG/DL (ref 0.57–1)
D-LACTATE SERPL-SCNC: 1.2 MMOL/L (ref 0.5–2)
DEPRECATED RDW RBC AUTO: 41.6 FL (ref 37–54)
EGFRCR SERPLBLD CKD-EPI 2021: 108.6 ML/MIN/1.73
EOSINOPHIL # BLD AUTO: 0.37 10*3/MM3 (ref 0–0.4)
EOSINOPHIL NFR BLD AUTO: 4.5 % (ref 0.3–6.2)
ERYTHROCYTE [DISTWIDTH] IN BLOOD BY AUTOMATED COUNT: 11.9 % (ref 12.3–15.4)
FLUAV RNA RESP QL NAA+PROBE: NOT DETECTED
FLUBV RNA RESP QL NAA+PROBE: NOT DETECTED
GLOBULIN UR ELPH-MCNC: 3 GM/DL
GLUCOSE SERPL-MCNC: 108 MG/DL (ref 65–99)
GLUCOSE UR STRIP-MCNC: NEGATIVE MG/DL
HAV IGM SERPL QL IA: ABNORMAL
HBV CORE IGM SERPL QL IA: ABNORMAL
HBV SURFACE AG SERPL QL IA: ABNORMAL
HCG SERPL QL: NEGATIVE
HCT VFR BLD AUTO: 42.3 % (ref 34–46.6)
HCV AB SER DONR QL: REACTIVE
HGB BLD-MCNC: 13.8 G/DL (ref 12–15.9)
HGB UR QL STRIP.AUTO: NEGATIVE
HOLD SPECIMEN: NORMAL
HOLD SPECIMEN: NORMAL
HYALINE CASTS UR QL AUTO: ABNORMAL /LPF
IMM GRANULOCYTES # BLD AUTO: 0.02 10*3/MM3 (ref 0–0.05)
IMM GRANULOCYTES NFR BLD AUTO: 0.2 % (ref 0–0.5)
KETONES UR QL STRIP: NEGATIVE
LEUKOCYTE ESTERASE UR QL STRIP.AUTO: ABNORMAL
LYMPHOCYTES # BLD AUTO: 2.57 10*3/MM3 (ref 0.7–3.1)
LYMPHOCYTES NFR BLD AUTO: 31 % (ref 19.6–45.3)
MCH RBC QN AUTO: 30.5 PG (ref 26.6–33)
MCHC RBC AUTO-ENTMCNC: 32.6 G/DL (ref 31.5–35.7)
MCV RBC AUTO: 93.6 FL (ref 79–97)
MONOCYTES # BLD AUTO: 0.51 10*3/MM3 (ref 0.1–0.9)
MONOCYTES NFR BLD AUTO: 6.2 % (ref 5–12)
NEUTROPHILS NFR BLD AUTO: 4.76 10*3/MM3 (ref 1.7–7)
NEUTROPHILS NFR BLD AUTO: 57.5 % (ref 42.7–76)
NITRITE UR QL STRIP: NEGATIVE
NRBC BLD AUTO-RTO: 0 /100 WBC (ref 0–0.2)
PH UR STRIP.AUTO: 7 [PH] (ref 5–8)
PLATELET # BLD AUTO: 180 10*3/MM3 (ref 140–450)
PMV BLD AUTO: 9.3 FL (ref 6–12)
POTASSIUM SERPL-SCNC: 4.1 MMOL/L (ref 3.5–5.2)
PROCALCITONIN SERPL-MCNC: 0.09 NG/ML (ref 0–0.25)
PROT SERPL-MCNC: 7.4 G/DL (ref 6–8.5)
PROT UR QL STRIP: NEGATIVE
RBC # BLD AUTO: 4.52 10*6/MM3 (ref 3.77–5.28)
RBC # UR STRIP: ABNORMAL /HPF
REF LAB TEST METHOD: ABNORMAL
RSV RNA NPH QL NAA+NON-PROBE: NOT DETECTED
SALICYLATES SERPL-MCNC: <0.3 MG/DL
SARS-COV-2 RNA RESP QL NAA+PROBE: NOT DETECTED
SODIUM SERPL-SCNC: 139 MMOL/L (ref 136–145)
SP GR UR STRIP: 1.02 (ref 1–1.03)
SQUAMOUS #/AREA URNS HPF: ABNORMAL /HPF
UROBILINOGEN UR QL STRIP: ABNORMAL
WBC # UR STRIP: ABNORMAL /HPF
WBC NRBC COR # BLD: 8.28 10*3/MM3 (ref 3.4–10.8)
WHOLE BLOOD HOLD COAG: NORMAL
WHOLE BLOOD HOLD SPECIMEN: NORMAL

## 2023-02-10 PROCEDURE — 0 HYDROMORPHONE 1 MG/ML SOLUTION: Performed by: SPECIALIST

## 2023-02-10 PROCEDURE — G0378 HOSPITAL OBSERVATION PER HR: HCPCS

## 2023-02-10 PROCEDURE — 80179 DRUG ASSAY SALICYLATE: CPT | Performed by: PHYSICIAN ASSISTANT

## 2023-02-10 PROCEDURE — 80053 COMPREHEN METABOLIC PANEL: CPT | Performed by: PHYSICIAN ASSISTANT

## 2023-02-10 PROCEDURE — 96375 TX/PRO/DX INJ NEW DRUG ADDON: CPT

## 2023-02-10 PROCEDURE — 0 HYDROXYZINE PER 25 MG: Performed by: PHYSICIAN ASSISTANT

## 2023-02-10 PROCEDURE — 25010000002 ONDANSETRON PER 1 MG: Performed by: PHYSICIAN ASSISTANT

## 2023-02-10 PROCEDURE — 25010000002 LORAZEPAM PER 2 MG: Performed by: INTERNAL MEDICINE

## 2023-02-10 PROCEDURE — 96365 THER/PROPH/DIAG IV INF INIT: CPT

## 2023-02-10 PROCEDURE — 80074 ACUTE HEPATITIS PANEL: CPT | Performed by: PHYSICIAN ASSISTANT

## 2023-02-10 PROCEDURE — 74177 CT ABD & PELVIS W/CONTRAST: CPT

## 2023-02-10 PROCEDURE — 96376 TX/PRO/DX INJ SAME DRUG ADON: CPT

## 2023-02-10 PROCEDURE — C9803 HOPD COVID-19 SPEC COLLECT: HCPCS

## 2023-02-10 PROCEDURE — 36415 COLL VENOUS BLD VENIPUNCTURE: CPT

## 2023-02-10 PROCEDURE — 87637 SARSCOV2&INF A&B&RSV AMP PRB: CPT | Performed by: PHYSICIAN ASSISTANT

## 2023-02-10 PROCEDURE — 96372 THER/PROPH/DIAG INJ SC/IM: CPT

## 2023-02-10 PROCEDURE — 25010000002 MORPHINE PER 10 MG: Performed by: STUDENT IN AN ORGANIZED HEALTH CARE EDUCATION/TRAINING PROGRAM

## 2023-02-10 PROCEDURE — 99285 EMERGENCY DEPT VISIT HI MDM: CPT

## 2023-02-10 PROCEDURE — 84703 CHORIONIC GONADOTROPIN ASSAY: CPT | Performed by: PHYSICIAN ASSISTANT

## 2023-02-10 PROCEDURE — 0 HYDROMORPHONE 1 MG/ML SOLUTION: Performed by: INTERNAL MEDICINE

## 2023-02-10 PROCEDURE — 80143 DRUG ASSAY ACETAMINOPHEN: CPT | Performed by: PHYSICIAN ASSISTANT

## 2023-02-10 PROCEDURE — 25010000002 IOPAMIDOL 61 % SOLUTION: Performed by: PHYSICIAN ASSISTANT

## 2023-02-10 PROCEDURE — 83605 ASSAY OF LACTIC ACID: CPT | Performed by: PHYSICIAN ASSISTANT

## 2023-02-10 PROCEDURE — 81001 URINALYSIS AUTO W/SCOPE: CPT | Performed by: PHYSICIAN ASSISTANT

## 2023-02-10 PROCEDURE — 25010000002 CEFTRIAXONE PER 250 MG: Performed by: PHYSICIAN ASSISTANT

## 2023-02-10 PROCEDURE — 84145 PROCALCITONIN (PCT): CPT | Performed by: PHYSICIAN ASSISTANT

## 2023-02-10 PROCEDURE — 25010000002 ONDANSETRON PER 1 MG: Performed by: SPECIALIST

## 2023-02-10 PROCEDURE — 85025 COMPLETE CBC W/AUTO DIFF WBC: CPT | Performed by: PHYSICIAN ASSISTANT

## 2023-02-10 RX ORDER — DEXTROSE, SODIUM CHLORIDE, SODIUM LACTATE, POTASSIUM CHLORIDE, AND CALCIUM CHLORIDE 5; .6; .31; .03; .02 G/100ML; G/100ML; G/100ML; G/100ML; G/100ML
75 INJECTION, SOLUTION INTRAVENOUS CONTINUOUS
Status: DISCONTINUED | OUTPATIENT
Start: 2023-02-10 | End: 2023-02-11 | Stop reason: HOSPADM

## 2023-02-10 RX ORDER — LORAZEPAM 2 MG/ML
1 INJECTION INTRAMUSCULAR ONCE
Status: COMPLETED | OUTPATIENT
Start: 2023-02-10 | End: 2023-02-10

## 2023-02-10 RX ORDER — HYDROXYZINE HYDROCHLORIDE 50 MG/ML
25 INJECTION, SOLUTION INTRAMUSCULAR ONCE
Status: COMPLETED | OUTPATIENT
Start: 2023-02-10 | End: 2023-02-10

## 2023-02-10 RX ORDER — LIDOCAINE 50 MG/G
1 OINTMENT TOPICAL ONCE
Status: DISCONTINUED | OUTPATIENT
Start: 2023-02-10 | End: 2023-02-11 | Stop reason: HOSPADM

## 2023-02-10 RX ORDER — SODIUM CHLORIDE 0.9 % (FLUSH) 0.9 %
10 SYRINGE (ML) INJECTION AS NEEDED
Status: DISCONTINUED | OUTPATIENT
Start: 2023-02-10 | End: 2023-02-11 | Stop reason: HOSPADM

## 2023-02-10 RX ORDER — ONDANSETRON 2 MG/ML
4 INJECTION INTRAMUSCULAR; INTRAVENOUS ONCE
Status: COMPLETED | OUTPATIENT
Start: 2023-02-10 | End: 2023-02-10

## 2023-02-10 RX ORDER — ONDANSETRON 2 MG/ML
4 INJECTION INTRAMUSCULAR; INTRAVENOUS EVERY 4 HOURS PRN
Status: DISCONTINUED | OUTPATIENT
Start: 2023-02-10 | End: 2023-02-11 | Stop reason: HOSPADM

## 2023-02-10 RX ADMIN — HYDROMORPHONE HYDROCHLORIDE 1 MG: 1 INJECTION, SOLUTION INTRAMUSCULAR; INTRAVENOUS; SUBCUTANEOUS at 18:21

## 2023-02-10 RX ADMIN — HYDROMORPHONE HYDROCHLORIDE 1 MG: 1 INJECTION, SOLUTION INTRAMUSCULAR; INTRAVENOUS; SUBCUTANEOUS at 20:59

## 2023-02-10 RX ADMIN — IOPAMIDOL 100 ML: 612 INJECTION, SOLUTION INTRAVENOUS at 17:33

## 2023-02-10 RX ADMIN — CEFTRIAXONE 1 G: 1 INJECTION, POWDER, FOR SOLUTION INTRAMUSCULAR; INTRAVENOUS at 19:03

## 2023-02-10 RX ADMIN — SODIUM CHLORIDE, SODIUM LACTATE, POTASSIUM CHLORIDE, CALCIUM CHLORIDE AND DEXTROSE MONOHYDRATE 75 ML/HR: 5; 600; 310; 30; 20 INJECTION, SOLUTION INTRAVENOUS at 20:59

## 2023-02-10 RX ADMIN — LORAZEPAM 1 MG: 2 INJECTION INTRAMUSCULAR; INTRAVENOUS at 19:18

## 2023-02-10 RX ADMIN — SODIUM CHLORIDE 1000 ML: 9 INJECTION, SOLUTION INTRAVENOUS at 16:41

## 2023-02-10 RX ADMIN — ONDANSETRON 4 MG: 2 INJECTION INTRAMUSCULAR; INTRAVENOUS at 16:42

## 2023-02-10 RX ADMIN — ONDANSETRON 4 MG: 2 INJECTION INTRAMUSCULAR; INTRAVENOUS at 20:59

## 2023-02-10 RX ADMIN — HYDROMORPHONE HYDROCHLORIDE 1 MG: 1 INJECTION, SOLUTION INTRAMUSCULAR; INTRAVENOUS; SUBCUTANEOUS at 19:19

## 2023-02-10 RX ADMIN — HYDROXYZINE HYDROCHLORIDE 25 MG: 50 INJECTION, SOLUTION INTRAMUSCULAR at 16:47

## 2023-02-10 RX ADMIN — MORPHINE SULFATE 4 MG: 4 INJECTION, SOLUTION INTRAMUSCULAR; INTRAVENOUS at 16:41

## 2023-02-10 NOTE — ED PROVIDER NOTES
"Subjective   History of Present Illness    Patient is a 42-year-old female presenting to ED with abdominal pain and concern for umbilical hernia.  PMH significant for anxiety, depression, History hepatitis C, appendectomy, history lithotripsies.  Patient states 2 years ago during her pregnancy she developed umbilical \"nerve pains.\"  Patient states after delivering she was advised that she may have an umbilical hernia and was recommended by her OB/GYN to follow-up with a surgeon however she never did.  Patient states that she would still have some mild intermittent pains and very mild swelling however it was never significantly uncomfortable.  Patient states 3 days ago she went to lift her arms up into the air when she felt a sudden pain and protrusion in her umbilical region.  Patient states since that time it has become progressively increased in size, swelling, tenderness, this morning she woke up with redness overlying it.  Patient states that the first couple days it was difficult to have a bowel movement due to increased pain however the past 2 days she is no longer able to have any bowel movements, no diarrhea stool, and is no longer passing gas.  Patient reports nausea due to the pain but denies any vomiting.  Patient reports a subjective fever today but denies any diaphoresis or chills.  Patient denies any other trauma including blunt trauma to her abdomen or any other new lifting/twisting/bending or abdominal exercises.  Patient denies use of any medications for her symptoms and presents at this time for further evaluation.    Patient last ate yogurt at 9 AM with no food since    Records reviewed show patient most recently seen outpatient at urgent care on 2/26/2021 for upper respiratory tract infection, acute nonrecurrent pansinusitis, right-sided otitis media, cough.    Patient last seen in the ED on 7/23/2020 for dental caries, gingivitis.    Review of Systems   Constitutional: Positive for fever " (subjective). Negative for chills and diaphoresis.   HENT: Negative.    Eyes: Negative.    Respiratory: Negative.    Cardiovascular: Negative.    Gastrointestinal: Positive for abdominal pain (umbilical), constipation and nausea. Negative for diarrhea and vomiting.   Genitourinary: Negative.  Negative for dysuria and flank pain.   Musculoskeletal: Negative.    Skin: Positive for color change (redness toumbilical protrusion).   Neurological: Negative.    Psychiatric/Behavioral: Negative.    All other systems reviewed and are negative.      Past Medical History:   Diagnosis Date   • Anxiety    • Bipolar affective disorder (HCC)    • Depression    • Gestational hypertension    • Hepatitis C    • Kidney infection    • PTSD (post-traumatic stress disorder)        No Known Allergies    Past Surgical History:   Procedure Laterality Date   • APPENDECTOMY     • CYSTOSCOPY W/ URETERAL STENT PLACEMENT Left 10/6/2018    Procedure: CYSTOSCOPY LEFT URETERAL STENT INSERTION;  Surgeon: Tyrone Machuca MD;  Location: [facility] OR;  Service: Urology   • TONSILLECTOMY     • URETEROSCOPY LASER LITHOTRIPSY WITH STENT INSERTION Left 10/17/2018    Procedure: URETEROSCOPY LASER LITHOTRIPSY WITH STENT EXCHANGE STONE EXTRACTION;  Surgeon: Tyrone Machuca MD;  Location: [facility] OR;  Service: Urology       Family History   Problem Relation Age of Onset   • Mental illness Mother    • Drug abuse Mother        Social History     Socioeconomic History   • Marital status: Significant Other     Spouse name: JOVON SCHREIBER   Tobacco Use   • Smoking status: Every Day     Packs/day: 0.25     Years: 20.00     Pack years: 5.00     Types: Cigarettes   • Smokeless tobacco: Never   Vaping Use   • Vaping Use: Never used   Substance and Sexual Activity   • Alcohol use: No   • Drug use: Not Currently   • Sexual activity: Yes     Partners: Male     Birth control/protection: None           Objective   Physical Exam  Vitals and nursing note reviewed.  [de-identified] : cough   Constitutional:       General: She is in acute distress (appears uncomfortable due to pain).      Appearance: Normal appearance. She is well-developed and well-groomed. She is not toxic-appearing or diaphoretic.   HENT:      Head: Normocephalic.      Mouth/Throat:      Mouth: Mucous membranes are moist.      Pharynx: Oropharynx is clear.   Eyes:      Extraocular Movements: Extraocular movements intact.      Conjunctiva/sclera: Conjunctivae normal.      Pupils: Pupils are equal, round, and reactive to light.   Cardiovascular:      Rate and Rhythm: Regular rhythm. Tachycardia present.   Pulmonary:      Effort: Pulmonary effort is normal.      Breath sounds: Normal breath sounds.   Abdominal:      General: There is no distension.      Palpations: Abdomen is soft.      Tenderness: There is abdominal tenderness.      Hernia: A hernia is present. Hernia is present in the umbilical area (Tenderness and erythema overlying with no skin wounds open).   Musculoskeletal:         General: Normal range of motion.      Cervical back: Normal range of motion and neck supple.   Skin:     General: Skin is warm and dry.      Findings: Erythema (as described in abdominal section) present.   Neurological:      Mental Status: She is alert and oriented to person, place, and time.      Gait: Gait normal.   Psychiatric:         Attention and Perception: Attention normal.         Mood and Affect: Mood is anxious. Affect is tearful.         Speech: Speech normal.         Behavior: Behavior is cooperative.         Procedures           ED Course  ED Course as of 02/10/23 1935   Fri Feb 10, 2023   1845 sukhi [APM]      ED Course User Index  [JS] Derrell Escalera PA-C                                           Medical Decision Making  Elevated LFTs: acute illness or injury  Incarcerated umbilical hernia: chronic illness or injury  Amount and/or Complexity of Data Reviewed  External Data Reviewed: labs, radiology and notes.  Labs: ordered.  [FreeTextEntry6] : 5 year old with history of intermittent RAD here for difficulty breathing last night. 2 days ago started having runny nose and yesterday started coughing.  Last night coughing more and breathing was fast and a little labored.  Gave albuterol X 2 yesterday at 6 pm and 11 pm.  Mom noticed breathing improved after nebs.  Also gave Mucinex and Zyrtec. This morning breathing seems a bit labored again, dry cough, mom did not give neb this morning. \par No fever.  Denies sore throat or ear pain, no stomach ache/diarrhea.  \par Attends [employer]  every day, no known sick contacts. \par  Decision-making details documented in ED Course.  Radiology: ordered. Decision-making details documented in ED Course.  ECG/medicine tests: ordered. Decision-making details documented in ED Course.  Discussion of management or test interpretation with external provider(s): Dr. Salgado (general surgeon)    Risk  Prescription drug management.  Decision regarding hospitalization.            Patient is a 42-year-old female presenting to ED with abdominal pain and concern for umbilical hernia.  PMH significant for anxiety, depression, History hepatitis C, appendectomy, history lithotripsies. Lab work revealed no abnormalities to CBC with no white blood cell count, stable H&H.  CMP showed , , alk phos 122 with normal bilirubin.  No renal dysfunction, no electrolyte abnormalities.  Salicylate and Acetaminophen levels negative. Hepatic panel obtained.  Low concern for systemic infection with normal lactic acid, normal procalcitonin.  Urinalysis with small leukocytes, 1+ bacteria, 3-5 WBC however 3-6 squamous epithelium.  Patient was given 1 g IV Rocephin and advised that her urine would be sent for culture for further determination of infection.  COVID, influenza, RSV testing negative.  Pregnancy testing negative.  CT imaging of the abdomen and pelvis showed: Left periumbilical hernia defect containing peritoneal fat or omentum with stranding of the fat within the hernia sac and small volume nonloculated fluid concerning for potential incarceration, no herniation of bowel, no bowel obstruction, 3 mm nonobstructing mid left intrarenal stone with scarring of the superior left renal cortex, no ureteral stones or hydronephrosis.  Case was discussed with Dr. Houston, general surgeon, who will, except patient for admission under his services for surgical intervention tomorrow.  Advised patient of need for admission for further surgical treatment for which she is amenable.  Patient was initially given morphine with no  pain relief, a dose of Dilaudid with only temporary pain relief, and Vistaril with only temporary relief of her anxiety.  Will order additional dose of Dilaudid, Ativan, and advised that we can apply topical lidocaine for further pain relief.  Patient is amenable with no further questions, concerns, or needs.    Final diagnoses:   Incarcerated umbilical hernia   Elevated LFTs       ED Disposition  ED Disposition     ED Disposition   Decision to Admit    Condition   --    Comment   Level of Care: Med/Surg [1]   Diagnosis: Incarcerated umbilical hernia [071706]   Admitting Physician: ISAAK DOMINGO [[id number]]               No follow-up provider specified.       Medication List      No changes were made to your prescriptions during this visit.          Derrell Escalera PA-C  02/10/23 8993

## 2023-02-11 ENCOUNTER — ANESTHESIA EVENT (OUTPATIENT)
Dept: PERIOP | Facility: HOSPITAL | Age: 43
End: 2023-02-11
Payer: MEDICAID

## 2023-02-11 ENCOUNTER — ANESTHESIA (OUTPATIENT)
Dept: PERIOP | Facility: HOSPITAL | Age: 43
End: 2023-02-11
Payer: MEDICAID

## 2023-02-11 VITALS
HEIGHT: 66 IN | OXYGEN SATURATION: 92 % | DIASTOLIC BLOOD PRESSURE: 80 MMHG | RESPIRATION RATE: 16 BRPM | BODY MASS INDEX: 28.01 KG/M2 | HEART RATE: 88 BPM | SYSTOLIC BLOOD PRESSURE: 121 MMHG | WEIGHT: 174.3 LBS | TEMPERATURE: 96.3 F

## 2023-02-11 PROCEDURE — 49594 RPR AA HRN 1ST 3-10 NCR/STRN: CPT | Performed by: SPECIALIST

## 2023-02-11 PROCEDURE — 25010000002 DROPERIDOL PER 5 MG: Performed by: NURSE ANESTHETIST, CERTIFIED REGISTERED

## 2023-02-11 PROCEDURE — G0378 HOSPITAL OBSERVATION PER HR: HCPCS

## 2023-02-11 PROCEDURE — 25010000002 HYDROMORPHONE PER 4 MG: Performed by: NURSE ANESTHETIST, CERTIFIED REGISTERED

## 2023-02-11 PROCEDURE — C1781 MESH (IMPLANTABLE): HCPCS | Performed by: SPECIALIST

## 2023-02-11 PROCEDURE — 25010000002 KETOROLAC TROMETHAMINE PER 15 MG: Performed by: NURSE ANESTHETIST, CERTIFIED REGISTERED

## 2023-02-11 PROCEDURE — 25010000002 MIDAZOLAM PER 1 MG: Performed by: NURSE ANESTHETIST, CERTIFIED REGISTERED

## 2023-02-11 PROCEDURE — 25010000002 FENTANYL CITRATE (PF) 250 MCG/5ML SOLUTION: Performed by: NURSE ANESTHETIST, CERTIFIED REGISTERED

## 2023-02-11 PROCEDURE — 25010000002 DEXAMETHASONE PER 1 MG: Performed by: NURSE ANESTHETIST, CERTIFIED REGISTERED

## 2023-02-11 PROCEDURE — 25010000002 VANCOMYCIN 1 G RECONSTITUTED SOLUTION 1 EACH VIAL: Performed by: SPECIALIST

## 2023-02-11 PROCEDURE — 0 HYDROMORPHONE 1 MG/ML SOLUTION: Performed by: SPECIALIST

## 2023-02-11 PROCEDURE — 25010000002 FENTANYL CITRATE (PF) 50 MCG/ML SOLUTION: Performed by: NURSE ANESTHETIST, CERTIFIED REGISTERED

## 2023-02-11 PROCEDURE — 88302 TISSUE EXAM BY PATHOLOGIST: CPT | Performed by: SPECIALIST

## 2023-02-11 PROCEDURE — 25010000002 ONDANSETRON PER 1 MG: Performed by: NURSE ANESTHETIST, CERTIFIED REGISTERED

## 2023-02-11 PROCEDURE — 25010000002 PROPOFOL 10 MG/ML EMULSION: Performed by: NURSE ANESTHETIST, CERTIFIED REGISTERED

## 2023-02-11 PROCEDURE — 25010000002 CEFTRIAXONE PER 250 MG: Performed by: NURSE ANESTHETIST, CERTIFIED REGISTERED

## 2023-02-11 PROCEDURE — 99223 1ST HOSP IP/OBS HIGH 75: CPT | Performed by: SPECIALIST

## 2023-02-11 PROCEDURE — 96376 TX/PRO/DX INJ SAME DRUG ADON: CPT

## 2023-02-11 DEVICE — VENTRALEX HERNIA PATCH, 6.4 CM (2.5"), MEDIUM CIRCLE WITH STRAP
Type: IMPLANTABLE DEVICE | Site: ABDOMEN | Status: FUNCTIONAL
Brand: VENTRALEX

## 2023-02-11 RX ORDER — LIDOCAINE HYDROCHLORIDE 20 MG/ML
INJECTION, SOLUTION EPIDURAL; INFILTRATION; INTRACAUDAL; PERINEURAL AS NEEDED
Status: DISCONTINUED | OUTPATIENT
Start: 2023-02-11 | End: 2023-02-11 | Stop reason: SURG

## 2023-02-11 RX ORDER — OXYCODONE AND ACETAMINOPHEN 10; 325 MG/1; MG/1
1 TABLET ORAL EVERY 4 HOURS PRN
Qty: 30 TABLET | Refills: 0 | Status: SHIPPED | OUTPATIENT
Start: 2023-02-11

## 2023-02-11 RX ORDER — DROPERIDOL 2.5 MG/ML
0.62 INJECTION, SOLUTION INTRAMUSCULAR; INTRAVENOUS ONCE AS NEEDED
Status: COMPLETED | OUTPATIENT
Start: 2023-02-11 | End: 2023-02-11

## 2023-02-11 RX ORDER — HYDROMORPHONE HYDROCHLORIDE 1 MG/ML
0.5 INJECTION, SOLUTION INTRAMUSCULAR; INTRAVENOUS; SUBCUTANEOUS
Status: DISCONTINUED | OUTPATIENT
Start: 2023-02-11 | End: 2023-02-11 | Stop reason: HOSPADM

## 2023-02-11 RX ORDER — LAMOTRIGINE 100 MG/1
200 TABLET ORAL 2 TIMES DAILY
Status: DISCONTINUED | OUTPATIENT
Start: 2023-02-11 | End: 2023-02-11 | Stop reason: HOSPADM

## 2023-02-11 RX ORDER — FLUOXETINE HYDROCHLORIDE 20 MG/1
40 CAPSULE ORAL DAILY
Status: DISCONTINUED | OUTPATIENT
Start: 2023-02-11 | End: 2023-02-11 | Stop reason: HOSPADM

## 2023-02-11 RX ORDER — ONDANSETRON 2 MG/ML
4 INJECTION INTRAMUSCULAR; INTRAVENOUS
Status: DISCONTINUED | OUTPATIENT
Start: 2023-02-11 | End: 2023-02-11 | Stop reason: HOSPADM

## 2023-02-11 RX ORDER — CLONAZEPAM 0.5 MG/1
0.5 TABLET ORAL 2 TIMES DAILY PRN
Status: DISCONTINUED | OUTPATIENT
Start: 2023-02-11 | End: 2023-02-11 | Stop reason: HOSPADM

## 2023-02-11 RX ORDER — BUPRENORPHINE HYDROCHLORIDE 8 MG/1
8 TABLET SUBLINGUAL 3 TIMES DAILY
Status: DISCONTINUED | OUTPATIENT
Start: 2023-02-11 | End: 2023-02-11 | Stop reason: HOSPADM

## 2023-02-11 RX ORDER — EPHEDRINE SULFATE 50 MG/ML
INJECTION INTRAVENOUS AS NEEDED
Status: DISCONTINUED | OUTPATIENT
Start: 2023-02-11 | End: 2023-02-11 | Stop reason: SURG

## 2023-02-11 RX ORDER — FENTANYL CITRATE 50 UG/ML
INJECTION, SOLUTION INTRAMUSCULAR; INTRAVENOUS AS NEEDED
Status: DISCONTINUED | OUTPATIENT
Start: 2023-02-11 | End: 2023-02-11 | Stop reason: SURG

## 2023-02-11 RX ORDER — NALOXONE HCL 0.4 MG/ML
0.04 VIAL (ML) INJECTION AS NEEDED
Status: DISCONTINUED | OUTPATIENT
Start: 2023-02-11 | End: 2023-02-11 | Stop reason: HOSPADM

## 2023-02-11 RX ORDER — CEFTRIAXONE 1 G/1
INJECTION, POWDER, FOR SOLUTION INTRAMUSCULAR; INTRAVENOUS AS NEEDED
Status: DISCONTINUED | OUTPATIENT
Start: 2023-02-11 | End: 2023-02-11 | Stop reason: SURG

## 2023-02-11 RX ORDER — ROCURONIUM BROMIDE 10 MG/ML
INJECTION, SOLUTION INTRAVENOUS AS NEEDED
Status: DISCONTINUED | OUTPATIENT
Start: 2023-02-11 | End: 2023-02-11 | Stop reason: SURG

## 2023-02-11 RX ORDER — LURASIDONE HYDROCHLORIDE 40 MG/1
60 TABLET, FILM COATED ORAL DAILY
Status: DISCONTINUED | OUTPATIENT
Start: 2023-02-11 | End: 2023-02-11 | Stop reason: HOSPADM

## 2023-02-11 RX ORDER — LABETALOL HYDROCHLORIDE 5 MG/ML
5 INJECTION, SOLUTION INTRAVENOUS
Status: DISCONTINUED | OUTPATIENT
Start: 2023-02-11 | End: 2023-02-11 | Stop reason: HOSPADM

## 2023-02-11 RX ORDER — VALACYCLOVIR HYDROCHLORIDE 500 MG/1
500 TABLET, FILM COATED ORAL
Status: DISCONTINUED | OUTPATIENT
Start: 2023-02-11 | End: 2023-02-11 | Stop reason: HOSPADM

## 2023-02-11 RX ORDER — FENTANYL CITRATE 50 UG/ML
25 INJECTION, SOLUTION INTRAMUSCULAR; INTRAVENOUS
Status: COMPLETED | OUTPATIENT
Start: 2023-02-11 | End: 2023-02-11

## 2023-02-11 RX ORDER — KETOROLAC TROMETHAMINE 30 MG/ML
INJECTION, SOLUTION INTRAMUSCULAR; INTRAVENOUS AS NEEDED
Status: DISCONTINUED | OUTPATIENT
Start: 2023-02-11 | End: 2023-02-11 | Stop reason: SURG

## 2023-02-11 RX ORDER — DEXAMETHASONE SODIUM PHOSPHATE 4 MG/ML
INJECTION, SOLUTION INTRA-ARTICULAR; INTRALESIONAL; INTRAMUSCULAR; INTRAVENOUS; SOFT TISSUE AS NEEDED
Status: DISCONTINUED | OUTPATIENT
Start: 2023-02-11 | End: 2023-02-11 | Stop reason: SURG

## 2023-02-11 RX ORDER — MIDAZOLAM HYDROCHLORIDE 1 MG/ML
INJECTION INTRAMUSCULAR; INTRAVENOUS AS NEEDED
Status: DISCONTINUED | OUTPATIENT
Start: 2023-02-11 | End: 2023-02-11 | Stop reason: SURG

## 2023-02-11 RX ORDER — BUPIVACAINE HYDROCHLORIDE AND EPINEPHRINE 5; 5 MG/ML; UG/ML
INJECTION, SOLUTION PERINEURAL AS NEEDED
Status: DISCONTINUED | OUTPATIENT
Start: 2023-02-11 | End: 2023-02-11 | Stop reason: HOSPADM

## 2023-02-11 RX ORDER — BUPIVACAINE HCL/0.9 % NACL/PF 0.125 %
PLASTIC BAG, INJECTION (ML) EPIDURAL AS NEEDED
Status: DISCONTINUED | OUTPATIENT
Start: 2023-02-11 | End: 2023-02-11 | Stop reason: SURG

## 2023-02-11 RX ORDER — KETAMINE HCL IN NACL, ISO-OSM 100MG/10ML
SYRINGE (ML) INJECTION AS NEEDED
Status: DISCONTINUED | OUTPATIENT
Start: 2023-02-11 | End: 2023-02-11 | Stop reason: SURG

## 2023-02-11 RX ORDER — ONDANSETRON 2 MG/ML
INJECTION INTRAMUSCULAR; INTRAVENOUS AS NEEDED
Status: DISCONTINUED | OUTPATIENT
Start: 2023-02-11 | End: 2023-02-11 | Stop reason: SURG

## 2023-02-11 RX ORDER — IBUPROFEN 600 MG/1
600 TABLET ORAL ONCE AS NEEDED
Status: DISCONTINUED | OUTPATIENT
Start: 2023-02-11 | End: 2023-02-11 | Stop reason: HOSPADM

## 2023-02-11 RX ORDER — PROPOFOL 10 MG/ML
VIAL (ML) INTRAVENOUS AS NEEDED
Status: DISCONTINUED | OUTPATIENT
Start: 2023-02-11 | End: 2023-02-11 | Stop reason: SURG

## 2023-02-11 RX ORDER — NEOSTIGMINE METHYLSULFATE 5 MG/5 ML
SYRINGE (ML) INTRAVENOUS AS NEEDED
Status: DISCONTINUED | OUTPATIENT
Start: 2023-02-11 | End: 2023-02-11 | Stop reason: SURG

## 2023-02-11 RX ORDER — SODIUM CHLORIDE, SODIUM LACTATE, POTASSIUM CHLORIDE, CALCIUM CHLORIDE 600; 310; 30; 20 MG/100ML; MG/100ML; MG/100ML; MG/100ML
INJECTION, SOLUTION INTRAVENOUS CONTINUOUS PRN
Status: DISCONTINUED | OUTPATIENT
Start: 2023-02-11 | End: 2023-02-11 | Stop reason: SURG

## 2023-02-11 RX ORDER — FLUMAZENIL 0.1 MG/ML
0.2 INJECTION INTRAVENOUS AS NEEDED
Status: DISCONTINUED | OUTPATIENT
Start: 2023-02-11 | End: 2023-02-11 | Stop reason: HOSPADM

## 2023-02-11 RX ORDER — OXYCODONE AND ACETAMINOPHEN 10; 325 MG/1; MG/1
1 TABLET ORAL ONCE AS NEEDED
Status: COMPLETED | OUTPATIENT
Start: 2023-02-11 | End: 2023-02-11

## 2023-02-11 RX ADMIN — LAMOTRIGINE 200 MG: 100 TABLET ORAL at 13:23

## 2023-02-11 RX ADMIN — PROPOFOL 150 MG: 10 INJECTION, EMULSION INTRAVENOUS at 09:06

## 2023-02-11 RX ADMIN — KETOROLAC TROMETHAMINE 30 MG: 30 INJECTION, SOLUTION INTRAMUSCULAR; INTRAVENOUS at 10:12

## 2023-02-11 RX ADMIN — FENTANYL CITRATE 25 MCG: 50 INJECTION INTRAMUSCULAR; INTRAVENOUS at 10:53

## 2023-02-11 RX ADMIN — GLYCOPYRROLATE 0.4 MG: 0.2 INJECTION INTRAMUSCULAR; INTRAVENOUS at 10:12

## 2023-02-11 RX ADMIN — FENTANYL CITRATE 50 MCG: 50 INJECTION, SOLUTION INTRAMUSCULAR; INTRAVENOUS at 10:12

## 2023-02-11 RX ADMIN — HYDROMORPHONE HYDROCHLORIDE 1 MG: 1 INJECTION, SOLUTION INTRAMUSCULAR; INTRAVENOUS; SUBCUTANEOUS at 07:29

## 2023-02-11 RX ADMIN — ONDANSETRON 4 MG: 2 INJECTION INTRAMUSCULAR; INTRAVENOUS at 10:12

## 2023-02-11 RX ADMIN — HYDROMORPHONE HYDROCHLORIDE 0.5 MG: 1 INJECTION, SOLUTION INTRAMUSCULAR; INTRAVENOUS; SUBCUTANEOUS at 11:03

## 2023-02-11 RX ADMIN — FENTANYL CITRATE 25 MCG: 50 INJECTION INTRAMUSCULAR; INTRAVENOUS at 11:10

## 2023-02-11 RX ADMIN — HYDROMORPHONE HYDROCHLORIDE 1 MG: 1 INJECTION, SOLUTION INTRAMUSCULAR; INTRAVENOUS; SUBCUTANEOUS at 04:19

## 2023-02-11 RX ADMIN — CEFTRIAXONE 1 G: 1 INJECTION, POWDER, FOR SOLUTION INTRAMUSCULAR; INTRAVENOUS at 09:13

## 2023-02-11 RX ADMIN — MIDAZOLAM HYDROCHLORIDE 2 MG: 2 INJECTION, SOLUTION INTRAMUSCULAR; INTRAVENOUS at 09:04

## 2023-02-11 RX ADMIN — ROCURONIUM BROMIDE 50 MG: 10 SOLUTION INTRAVENOUS at 09:06

## 2023-02-11 RX ADMIN — Medication 3 MG: at 10:12

## 2023-02-11 RX ADMIN — EPHEDRINE SULFATE 20 MG: 50 INJECTION INTRAVENOUS at 10:19

## 2023-02-11 RX ADMIN — DEXAMETHASONE SODIUM PHOSPHATE 8 MG: 4 INJECTION, SOLUTION INTRA-ARTICULAR; INTRALESIONAL; INTRAMUSCULAR; INTRAVENOUS; SOFT TISSUE at 10:12

## 2023-02-11 RX ADMIN — DROPERIDOL 0.62 MG: 2.5 INJECTION, SOLUTION INTRAMUSCULAR; INTRAVENOUS at 10:47

## 2023-02-11 RX ADMIN — SODIUM CHLORIDE, POTASSIUM CHLORIDE, SODIUM LACTATE AND CALCIUM CHLORIDE: 600; 310; 30; 20 INJECTION, SOLUTION INTRAVENOUS at 09:02

## 2023-02-11 RX ADMIN — FENTANYL CITRATE 25 MCG: 50 INJECTION INTRAMUSCULAR; INTRAVENOUS at 11:05

## 2023-02-11 RX ADMIN — FENTANYL CITRATE 25 MCG: 50 INJECTION INTRAMUSCULAR; INTRAVENOUS at 10:48

## 2023-02-11 RX ADMIN — OXYCODONE AND ACETAMINOPHEN 1 TABLET: 325; 10 TABLET ORAL at 11:08

## 2023-02-11 RX ADMIN — LIDOCAINE HYDROCHLORIDE 100 MG: 20 INJECTION, SOLUTION EPIDURAL; INFILTRATION; INTRACAUDAL at 10:12

## 2023-02-11 RX ADMIN — Medication 10 MG: at 10:12

## 2023-02-11 RX ADMIN — HYDROMORPHONE HYDROCHLORIDE 0.5 MG: 1 INJECTION, SOLUTION INTRAMUSCULAR; INTRAVENOUS; SUBCUTANEOUS at 10:47

## 2023-02-11 RX ADMIN — Medication 100 MCG: at 09:57

## 2023-02-11 RX ADMIN — LURASIDONE HYDROCHLORIDE 60 MG: 40 TABLET, FILM COATED ORAL at 13:23

## 2023-02-11 RX ADMIN — CLONAZEPAM 0.5 MG: 0.5 TABLET ORAL at 13:27

## 2023-02-11 RX ADMIN — HYDROMORPHONE HYDROCHLORIDE 1 MG: 1 INJECTION, SOLUTION INTRAMUSCULAR; INTRAVENOUS; SUBCUTANEOUS at 00:11

## 2023-02-11 RX ADMIN — Medication 20 MG: at 09:04

## 2023-02-11 NOTE — OP NOTE
VENTRAL/INCISIONAL HERNIA REPAIR  Procedure Note    Arin De La Cruz  2/11/2023    Pre-op Diagnosis:   Incarcerated 3 cm umbilical hernia    Post-op Diagnosis:     same    Procedure/CPT® Codes:      Procedure(s):  INCARCERATED VENTRAL HERNIA REPAIR    Surgeon(s):  Mercedez Salgado MD    Anesthesia: General    Staff:   Circulator: David Trevino RN; Renee Mccoy RN  Scrub Person: Kaykay Dumont; Donnie Caceres    Estimated Blood Loss: minimal    Specimens:                none        Findings: Incarcerated with omentum that we excised.  Defect measured 3 cm.  Placed a 6.4 cm Ventralex mesh    Complications: none    Description of Procedure:  The patient was brought to the operating room and placed in the supine position. After the induction of general anesthesia and infusion of IV antibiotics, the patient was prepped and draped in the usual sterile fashion. A semicircular incision was made around the umbilicus.  Subcutaneous tissue was dissected with electrocautery down to the fascia.  The umbilical stalk and hernia were circumferentially dissected and isolated.  The umbilical stalk and hernia was amputated at the level of the fascia.  I excised the incarcerated omentum as it was a little dusky.  The hernia sac was debrided from the umbilicus.  The mesh was soaked in antibiotic solution, positioned underneath the fascia.  Four-quadrant stay sutures were placed with interrupted 0 Prolenes at 12:00 6:00 3:00 and 9:00.  The fascia was closed using interrupted 0 Prolene incorporating bites of the tabs of the mesh.  The four-quadrant stay sutures were then tied.  Marcaine  was injected into the closed fascia.  The umbilical stalk was tacked to the closed fascia with interrupted 2-0 Vicryl.  The skin was reapproximated 2-0 Vicryl and running 4-0 Vicryl subcuticular. Dressings were placed.  The patient was transferred to the recovery room in stable condition having tolerated the procedure well.  At the end of the  procedure all counts were correct.    Mercedez Salgado MD     Date: 2/11/2023  Time: 10:18 CST

## 2023-02-11 NOTE — ANESTHESIA PREPROCEDURE EVALUATION
Anesthesia Evaluation     Patient summary reviewed and Nursing notes reviewed   NPO Solid Status: > 8 hours  NPO Liquid Status: > 8 hours           Airway   Mallampati: I  TM distance: >3 FB  Neck ROM: full  No difficulty expected  Dental    (+) edentulous    Pulmonary - normal exam   (+) a smoker Current,   Cardiovascular - negative cardio ROS and normal exam  Exercise tolerance: good (4-7 METS)        Neuro/Psych  (+) psychiatric history Bipolar,    GI/Hepatic/Renal/Endo    (+)   hepatitis C, liver disease, renal disease stones,   (-) diabetes, no thyroid disorder    Musculoskeletal     (+) chronic pain,       ROS comment: subutex  Abdominal    Substance History - negative use     OB/GYN    (-)  Pregnant        Other - negative ROS                     Anesthesia Plan    ASA 2 - emergent     general     intravenous induction     Anesthetic plan, risks, benefits, and alternatives have been provided, discussed and informed consent has been obtained with: patient.        CODE STATUS:

## 2023-02-11 NOTE — PLAN OF CARE
Goal Outcome Evaluation:                   Pt NPO for surgery later today. IVF infusing per order. SCDs for VTE. Pt medicated for pain using IV pain meds. No distress noted.

## 2023-02-11 NOTE — H&P
Mercedez Salgado MD  H&P    Patient Care Team:  Felicita Whalen APRN as PCP - General (Family Medicine)    Chief complaint incarcerated umbilical hernia    Subjective     Arin De La Cruz  is a 42 y.o. female presented to the ER yesterday with 2-day history of painful bulge at her umbilicus.  She states she has had a hernia since the birth of her son 2 years ago but is always been soft and not hurt.  However Thursday she began experiencing significant pain and redness to the bulge and was not reducible.  She has had no nausea no vomiting no fevers no bloating.  Pain is located just at the site of the umbilicus..      Review of Systems   Pertinent items are noted in HPI, all other systems reviewed and negative    History  Past Medical History:   Diagnosis Date   • Anxiety    • Bipolar affective disorder (HCC)    • Depression    • Gestational hypertension    • Hepatitis C    • Kidney infection    • PTSD (post-traumatic stress disorder)      Past Surgical History:   Procedure Laterality Date   • APPENDECTOMY     • CYSTOSCOPY W/ URETERAL STENT PLACEMENT Left 10/6/2018    Procedure: CYSTOSCOPY LEFT URETERAL STENT INSERTION;  Surgeon: Tyrone Machuca MD;  Location: [facility] OR;  Service: Urology   • TONSILLECTOMY     • URETEROSCOPY LASER LITHOTRIPSY WITH STENT INSERTION Left 10/17/2018    Procedure: URETEROSCOPY LASER LITHOTRIPSY WITH STENT EXCHANGE STONE EXTRACTION;  Surgeon: Tyrone Machuca MD;  Location: [facility] OR;  Service: Urology     Family History   Problem Relation Age of Onset   • Mental illness Mother    • Drug abuse Mother      Social History     Tobacco Use   • Smoking status: Every Day     Packs/day: 0.25     Years: 20.00     Pack years: 5.00     Types: Cigarettes   • Smokeless tobacco: Never   Vaping Use   • Vaping Use: Never used   Substance Use Topics   • Alcohol use: No   • Drug use: Not Currently     Medications Prior to Admission   Medication Sig Dispense Refill Last Dose   •  brompheniramine-pseudoephedrine-DM 30-2-10 MG/5ML syrup Take 5 mL by mouth 4 (Four) Times a Day As Needed for Congestion, Cough or Allergies. 473 mL 0    • buprenorphine (SUBUTEX) 8 MG sublingual tablet SL tablet 8 mg 3 (Three) Times a Day.      • clonazePAM (KlonoPIN) 0.5 MG tablet Take 0.5 mg by mouth 2 (Two) Times a Day As Needed for Anxiety.      • FLUoxetine (PROzac) 40 MG capsule Take 40 capsules by mouth Daily.      • fluticasone (Flonase) 50 MCG/ACT nasal spray 2 sprays into the nostril(s) as directed by provider Daily. 2 puffs each nostril 18.2 mL 0    • lamoTRIgine (LaMICtal) 200 MG tablet Take 200 mg by mouth 2 (Two) Times a Day.      • LATUDA 60 MG tablet tablet 60 mg Daily.      • valACYclovir (VALTREX) 1000 MG tablet Take 0.5 tablets by mouth 2 (Two) Times a Day. 30 tablet 1      Allergies:  Patient has no known allergies.    Objective     Vital Signs  Temp:  [97.6 °F (36.4 °C)-98.2 °F (36.8 °C)] 98.1 °F (36.7 °C)  Heart Rate:  [66-92] 67  Resp:  [16-18] 16  BP: (101-153)/(48-84) 101/56    Physical Exam:      General Appearance:    Alert, cooperative, in no acute distress   Head:    Normocephalic, without obvious abnormality, atraumatic   Eyes:            Lids and lashes normal, conjunctivae and sclerae normal, no   icterus, no pallor, corneas clear, PERRLA   Ears:    Ears appear intact with no abnormalities noted   Neck:   No adenopathy, supple, trachea midline   Back:     No kyphosis present, no scoliosis present, no skin lesions,      erythema or scars, no tenderness to percussion or                   palpation,   range of motion normal   Lungs:     Clear to auscultation,respirations regular, even and                  unlabored    Heart:    Regular rhythm and normal rate, normal S1 and S2, no            murmur, no gallop, no rub, no click   Chest Wall:    No abnormalities observed   Abdomen:    Soft slightly overweight no organomegaly masses she has a bulge at her umbilicus with slight erythema to  the skin and very tender but otherwise her abdomen is soft and nontender good bowel sounds   Rectal:     Deferred   Extremities:   Moves all extremities well, no edema, no cyanosis, no             redness   Pulses:   Pulses palpable and equal bilaterally   Skin:   No bleeding, bruising or rash   Lymph nodes:   No palpable adenopathy   Neurologic:   No focal deficits       Results Review:      Lab Results (last 72 hours)     Procedure Component Value Units Date/Time    Hepatitis Panel, Acute [[id number]]  (Abnormal) Collected: 02/10/23 1903    Specimen: Blood Updated: 02/10/23 2053     Hepatitis B Surface Ag Non-Reactive     Hep A IgM Non-Reactive     Hep B C IgM Non-Reactive     Hepatitis C Ab Reactive    Narrative:      Results may be falsely decreased if patient taking Biotin.     [place] Draw [[id number]] Collected: 02/10/23 1551    Specimen: Blood Updated: 02/10/23 2001    Narrative:      The following orders were created for panel order [place] Draw.  Procedure                               Abnormality         Status                     ---------                               -----------         ------                     Green Top (Gel)[[id number]]                                  Final result               Lavender Top[[id number]]                                     Final result               Kay Top[[id number]]                                         Final result               Light Blue Top[[id number]]                                   Final result                 Please view results for these tests on the individual orders.    Kay Top [[id number]] Collected: 02/10/23 1551    Specimen: Blood Updated: 02/10/23 2001     Extra Tube Hold for add-ons.     Comment: Auto resulted.       Salicylate Level [[id number]]  (Normal) Collected: 02/10/23 1551    Specimen: Blood Updated: 02/10/23 1921     Salicylate <0.3 mg/dL     Acetaminophen Level [[id number]]  (Normal) Collected: 02/10/23 1551    Specimen: Blood Updated:  02/10/23 1921     Acetaminophen <5.0 mcg/mL     COVID-19, FLU A/B, RSV PCR - Swab, Nasopharynx [[id number]]  (Normal) Collected: 02/10/23 1639    Specimen: Swab from Nasopharynx Updated: 02/10/23 1827     COVID19 Not Detected     Influenza A PCR Not Detected     Influenza B PCR Not Detected     RSV, PCR Not Detected    Narrative:      Fact sheet for providers: https://www.fda.gov/media/649315/download    Fact sheet for patients: https://www.fda.gov/media/143981/download    Test performed by PCR.    hCG, Serum, Qualitative [[id number]]  (Normal) Collected: 02/10/23 1640    Specimen: Blood Updated: 02/10/23 1719     HCG Qualitative Negative    Urinalysis, Microscopic Only - Urine, Clean Catch [[id number]]  (Abnormal) Collected: 02/10/23 1639    Specimen: Urine, Clean Catch Updated: 02/10/23 1706     RBC, UA 3-5 /HPF      WBC, UA 3-5 /HPF      Comment: Urine culture not indicated.        Bacteria, UA 1+ /HPF      Squamous Epithelial Cells, UA 3-6 /HPF      Hyaline Casts, UA 0-2 /LPF      Methodology Automated Microscopy    Urinalysis With Culture If Indicated - Urine, Clean Catch [[id number]]  (Abnormal) Collected: 02/10/23 1639    Specimen: Urine, Clean Catch Updated: 02/10/23 1706     Color, UA Yellow     Appearance, UA Clear     pH, UA 7.0     Specific Gravity, UA 1.017     Glucose, UA Negative     Ketones, UA Negative     Bilirubin, UA Negative     Blood, UA Negative     Protein, UA Negative     Leuk Esterase, UA Small (1+)     Nitrite, UA Negative     Urobilinogen, UA 1.0 E.U./dL    Narrative:      In absence of clinical symptoms, the presence of pyuria, bacteria, and/or nitrites on the urinalysis result does not correlate with infection.    Lavender Top [[id number]] Collected: 02/10/23 1551    Specimen: Blood Updated: 02/10/23 1700     Extra Tube hold for add-on     Comment: Auto resulted       Light Blue Top [[id number]] Collected: 02/10/23 1551    Specimen: Blood Updated: 02/10/23 1700     Extra Tube Hold for  "add-ons.     Comment: Auto resulted       Green Top (Gel) [[id number]] Collected: 02/10/23 1551    Specimen: Blood Updated: 02/10/23 1700     Extra Tube Hold for add-ons.     Comment: Auto resulted.       Procalcitonin [[id number]]  (Normal) Collected: 02/10/23 1551    Specimen: Blood Updated: 02/10/23 1644     Procalcitonin 0.09 ng/mL     Narrative:      As a Marker for Sepsis (Non-Neonates):    1. <0.5 ng/mL represents a low risk of severe sepsis and/or septic shock.  2. >2 ng/mL represents a high risk of severe sepsis and/or septic shock.    As a Marker for Lower Respiratory Tract Infections that require antibiotic therapy:    PCT on Admission    Antibiotic Therapy       6-12 Hrs later    >0.5                Strongly Recommended  >0.25 - <0.5        Recommended   0.1 - 0.25          Discouraged              Remeasure/reassess PCT  <0.1                Strongly Discouraged     Remeasure/reassess PCT    As 28 day mortality risk marker: \"Change in Procalcitonin Result\" (>80% or <=80%) if Day 0 (or Day 1) and Day 4 values are available. Refer to http://www.[email or web address]-pct-calculator.com    Change in PCT <=80%  A decrease of PCT levels below or equal to 80% defines a positive change in PCT test result representing a higher risk for 28-day all-cause mortality of patients diagnosed with severe sepsis for septic shock.    Change in PCT >80%  A decrease of PCT levels of more than 80% defines a negative change in PCT result representing a lower risk for 28-day all-cause mortality of patients diagnosed with severe sepsis or septic shock.       Comprehensive Metabolic Panel [[id number]]  (Abnormal) Collected: 02/10/23 1551    Specimen: Blood Updated: 02/10/23 1637     Glucose 108 mg/dL      BUN 7 mg/dL      Creatinine 0.72 mg/dL      Sodium 139 mmol/L      Potassium 4.1 mmol/L      Comment: Slight hemolysis detected by analyzer. Results may be affected.        Chloride 100 mmol/L      CO2 26.0 mmol/L      Calcium 9.3 mg/dL      Total " Protein 7.4 g/dL      Albumin 4.4 g/dL      ALT (SGPT) 152 U/L      AST (SGOT) 121 U/L      Alkaline Phosphatase 122 U/L      Total Bilirubin 0.3 mg/dL      Globulin 3.0 gm/dL      A/G Ratio 1.5 g/dL      BUN/Creatinine Ratio 9.7     Anion Gap 13.0 mmol/L      eGFR 108.6 mL/min/1.73     Narrative:      GFR Normal >60  Chronic Kidney Disease <60  Kidney Failure <15      Lactic Acid, Plasma [[id number]]  (Normal) Collected: 02/10/23 1551    Specimen: Blood Updated: 02/10/23 1635     Lactate 1.2 mmol/L     CBC & Differential [[id number]]  (Abnormal) Collected: 02/10/23 1551    Specimen: Blood Updated: 02/10/23 1622    Narrative:      The following orders were created for panel order CBC & Differential.  Procedure                               Abnormality         Status                     ---------                               -----------         ------                     CBC Auto Differential[[id number]]        Abnormal            Final result                 Please view results for these tests on the individual orders.    CBC Auto Differential [[id number]]  (Abnormal) Collected: 02/10/23 1551    Specimen: Blood Updated: 02/10/23 1622     WBC 8.28 10*3/mm3      RBC 4.52 10*6/mm3      Hemoglobin 13.8 g/dL      Hematocrit 42.3 %      MCV 93.6 fL      MCH 30.5 pg      MCHC 32.6 g/dL      RDW 11.9 %      RDW-SD 41.6 fl      MPV 9.3 fL      Platelets 180 10*3/mm3      Neutrophil % 57.5 %      Lymphocyte % 31.0 %      Monocyte % 6.2 %      Eosinophil % 4.5 %      Basophil % 0.6 %      Immature Grans % 0.2 %      Neutrophils, Absolute 4.76 10*3/mm3      Lymphocytes, Absolute 2.57 10*3/mm3      Monocytes, Absolute 0.51 10*3/mm3      Eosinophils, Absolute 0.37 10*3/mm3      Basophils, Absolute 0.05 10*3/mm3      Immature Grans, Absolute 0.02 10*3/mm3      nRBC 0.0 /100 WBC         Imaging Results (Last 72 Hours)     Procedure Component Value Units Date/Time    CT Abdomen Pelvis With Contrast [[id number]] Collected: 02/10/23 9529      Updated: 02/10/23 1749    Narrative:      CT ABDOMEN PELVIS W CONTRAST- 2/10/2023 5:30 PM CST     HISTORY: umbilical protrusion, tender/red; no BM or flatus in 3 days      COMPARISON: 01/18/2012     DOSE LENGTH PRODUCT: 265 mGy cm. Automated exposure control was also  utilized to decrease patient radiation dose.     TECHNIQUE: Axial images of the abdomen and pelvis are performed  following IV contrast     FINDINGS:  There is no suspicious focal liver or splenic mass. Spleen is  similar in size. No pancreatic cyst or mass identified. No  peripancreatic inflammation. Gallbladder is unremarkable. No adrenal  nodule. 3 mm nonobstructing mid left intrarenal stone with mild cortical  thinning of the superior left kidney no enhancing renal mass. No  hydronephrosis. Bladder appears intact. Uterus is unremarkable. No  adnexal mass.     There is a left periumbilical hernia defect which does contain  peritoneal versus omental with mild stranding of the within the hernia  sac and small volume nonloculated fluid of the hernia sac. Finding is  concerning for incarceration. Correlation for reducibility recommended.  There is no herniation of bowel into the hernia sac. No evidence for  bowel obstruction. No free air or abscess. Moderate fecal stasis. Mild  colonic diverticulosis. Patent abdominal aorta with no aneurysm or  dissection. No pathologic lymphadenopathy.     The visible lung bases demonstrate mild atelectasis. No focal  destructive regional osseous lesions. Degenerative changes greatest at  the L4/L5 level.       Impression:      1. There is a left periumbilical hernia defect containing peritoneal fat  or omentum with stranding of the fat within the hernia sac and small  volume nonloculated fluid. Findings concerning for potential  incarceration. Correlation for reducibility recommended. No herniation  of bowel. No bowel obstruction.  2. 3 mm nonobstructing mid left intrarenal stone with scarring of the  superior  left renal cortex. No ureteral stones or hydronephrosis.  This report was finalized on 02/10/2023 17:45 by Dr. Jaz Rushing MD.          Assessment & Plan       Incarcerated umbilical hernia      I reviewed her CT scan.  She has a incarcerated umbilical hernia with omentum without bowel incarcerated in her hernia.  I reviewed her labs    Plan #1 incarcerated umbilical hernia- we will proceed with repair of incarcerated umbilical hernia with possible resection of omentum.  The risks of bleeding infection injury to surrounding structures difficulty due to the incarceration nature of the hernia as well as the possibility that the umbilical stalk does not stay attached to the closed fascia all were discussed with the patient.    2.  Hepatitis C- she is aware of this diagnosis but has not done anything about it.  She does want to look into treatment as an outpatient.      Mercedez Salgado MD  02/11/23  07:55 CST

## 2023-02-11 NOTE — ANESTHESIA POSTPROCEDURE EVALUATION
"Patient: Arin De La Cruz    Procedure Summary     Date: 02/11/23 Room / Location:  [facility] OR 04 /  [facility] OR    Anesthesia Start: 0902 Anesthesia Stop: 1026    Procedure: INCARCERATED VENTRAL HERNIA REPAIR (Abdomen) Diagnosis:     Surgeons: Mercedez Salgado MD Provider: Val Ness CRNA    Anesthesia Type: general ASA Status: 2 - Emergent          Anesthesia Type: general    Vitals  Vitals Value Taken Time   /67 02/11/23 1115   Temp 97.6 °F (36.4 °C) 02/11/23 1025   Pulse 73 02/11/23 1115   Resp 14 02/11/23 1115   SpO2 93 % 02/11/23 1115           Post Anesthesia Care and Evaluation    Patient location during evaluation: PACU  Patient participation: complete - patient participated  Level of consciousness: awake and awake and alert  Pain score: 0  Pain management: adequate    Airway patency: patent  Anesthetic complications: No anesthetic complications  PONV Status: none  Cardiovascular status: acceptable  Respiratory status: acceptable  Hydration status: acceptable    Comments: Patient discharged according to acceptable Nnamdi score per RN assessment. See nursing records for further information.     Blood pressure 116/67, pulse 73, temperature 97.6 °F (36.4 °C), temperature source Temporal, resp. rate 14, height 167.6 cm (66\"), weight 79.1 kg (174 lb 4.8 oz), last menstrual period 01/12/2023, SpO2 93 %, not currently breastfeeding.        "
No

## 2023-02-11 NOTE — DISCHARGE SUMMARY
Mercedez Salgado MD Discharge Summary    Date of Admission: 2/10/2023  Date of Discharge:  2/11/2023    Discharge Diagnosis: Incarcerated umbilical hernia    Presenting Problem/History of Present Illness    History and Physical as outlined in the chart    Hospital Course  Patient was admitted and underwent the above operation.  Hospital course  was uneventful.  Patient will be discharged to home.  See medication reconciliation for medications at discharge.    Procedures Performed  Procedure(s):  INCARCERATED VENTRAL HERNIA REPAIR       Consults:   Consults     No orders found for last 30 day(s).          Condition on Discharge:  stable      Discharge Disposition  Home or Self Care    Discharge Medications     Discharge Medications      New Medications      Instructions Start Date   oxyCODONE-acetaminophen  MG per tablet  Commonly known as: Percocet   1 tablet, Oral, Every 4 Hours PRN         Continue These Medications      Instructions Start Date   brompheniramine-pseudoephedrine-DM 30-2-10 MG/5ML syrup   5 mL, Oral, 4 Times Daily PRN      buprenorphine 8 MG sublingual tablet SL tablet  Commonly known as: SUBUTEX   8 mg, 3 Times Daily      clonazePAM 0.5 MG tablet  Commonly known as: KlonoPIN   0.5 mg, Oral, 2 Times Daily PRN      FLUoxetine 40 MG capsule  Commonly known as: PROzac   40 capsules, Oral, Daily      fluticasone 50 MCG/ACT nasal spray  Commonly known as: Flonase   2 sprays, Nasal, Daily, 2 puffs each nostril      lamoTRIgine 200 MG tablet  Commonly known as: LaMICtal   200 mg, Oral, 2 Times Daily      Latuda 60 MG tablet tablet  Generic drug: lurasidone HCl   60 mg, Daily      valACYclovir 1000 MG tablet  Commonly known as: VALTREX   500 mg, Oral, 2 Times Daily             Discharge Diet:     Activity at Discharge:   Activity Instructions     Discharge Activity      No driving while on pain medications  Restrict lifting to 10 lbs for 4 weeks          Follow-up Appointments  No future  appointments.  Additional Instructions for the Follow-ups that You Need to Schedule     Discharge Follow-up with Specified Provider: me; 3 Weeks   As directed      To: me    Follow Up: 3 Weeks               Test Results Pending at Discharge  Pending Labs     Order Current Status    Tissue Pathology Exam Collected (02/11/23 0932)           Mercedez Salgado MD  02/11/23  10:30 CST    Time: Time spent at discharge 30 minutes

## 2023-02-11 NOTE — ANESTHESIA PROCEDURE NOTES
Airway  Urgency: elective    Date/Time: 2/11/2023 9:08 AM  Airway not difficult    General Information and Staff    Patient location during procedure: OR  CRNA/CAA: Val Ness CRNA    Indications and Patient Condition  Indications for airway management: airway protection    Preoxygenated: yes  Mask difficulty assessment: 0 - not attempted    Final Airway Details  Final airway type: endotracheal airway      Successful airway: ETT  Cuffed: yes   Successful intubation technique: direct laryngoscopy  Facilitating devices/methods: intubating stylet  Endotracheal tube insertion site: oral  Blade: Schultz  Blade size: 2  ETT size (mm): 7.0  Cormack-Lehane Classification: grade I - full view of glottis  Placement verified by: chest auscultation and capnometry   Cuff volume (mL): 4  Measured from: lips  ETT/EBT  to lips (cm): 22  Number of attempts at approach: 1  Assessment: lips, teeth, and gum same as pre-op and atraumatic intubation    Additional Comments  atraumatic

## 2023-02-13 NOTE — PAYOR COMM NOTE
"Jody Brandt (42 y.o. Female) [id number]  OBSERVATION ADMIT 2/10  D/C 2/11  [facility] phone   Fax        Date of Birth   1980    Social Security Number       Address   [address] [facility] [address]    Home Phone   [phone]    MRN   [id number]       Amish   Buddhism    Marital Status   Significant Other                            Admission Date   2/10/23    Admission Type   Emergency    Admitting Provider   Mercedez Salgado MD    Attending Provider       Department, Room/Bed   [facility] 3C, 388/1       Discharge Date   2/11/2023    Discharge Disposition   Home or Self Care    Discharge Destination                               Attending Provider: (none)   Allergies: No Known Allergies    Isolation: None   Infection: COVID (rule out) (02/10/23)   Code Status: Prior    Ht: 167.6 cm (66\")   Wt: 79.1 kg (174 lb 4.8 oz)    Admission Cmt: None   Principal Problem: Incarcerated umbilical hernia [K42.0]                 Active Insurance as of 2/10/2023     Primary Coverage     Payor Plan Insurance Group Employer/Plan Group    [place] [id number]     Payor Plan Address Payor Plan Phone Number Payor Plan Fax Number Effective Dates    [employer] [address] [address] [phone]  7/1/2020 - None Entered    [facility] [id number]       Subscriber Name Subscriber Birth Date Member ID       JODY BRANDT 1980 [id number]                 Emergency Contacts      (Rel.) Home Phone Work Phone Mobile Phone    Bull Montero (Significant Other) [phone] -- --    Ron Nunez (Other) [phone] -- --               History & Physical      Mercedez Salgado MD at 02/11/23 6861            Mercedez Salgado MD  H&P    Patient Care Team:  Felicita Whalen APRN as PCP - General (Family Medicine)    Chief complaint incarcerated umbilical hernia    Subjective      Jody Brandt  is a 42 y.o. female " presented to the ER yesterday with 2-day history of painful bulge at her umbilicus.  She states she has had a hernia since the birth of her son 2 years ago but is always been soft and not hurt.  However Thursday she began experiencing significant pain and redness to the bulge and was not reducible.  She has had no nausea no vomiting no fevers no bloating.  Pain is located just at the site of the umbilicus..      Review of Systems   Pertinent items are noted in HPI, all other systems reviewed and negative    History  Past Medical History:   Diagnosis Date   • Anxiety    • Bipolar affective disorder (HCC)    • Depression    • Gestational hypertension    • Hepatitis C    • Kidney infection    • PTSD (post-traumatic stress disorder)      Past Surgical History:   Procedure Laterality Date   • APPENDECTOMY     • CYSTOSCOPY W/ URETERAL STENT PLACEMENT Left 10/6/2018    Procedure: CYSTOSCOPY LEFT URETERAL STENT INSERTION;  Surgeon: Tyrone Machuca MD;  Location: [facility] OR;  Service: Urology   • TONSILLECTOMY     • URETEROSCOPY LASER LITHOTRIPSY WITH STENT INSERTION Left 10/17/2018    Procedure: URETEROSCOPY LASER LITHOTRIPSY WITH STENT EXCHANGE STONE EXTRACTION;  Surgeon: Tyrone Machuca MD;  Location: [facility] OR;  Service: Urology     Family History   Problem Relation Age of Onset   • Mental illness Mother    • Drug abuse Mother      Social History     Tobacco Use   • Smoking status: Every Day     Packs/day: 0.25     Years: 20.00     Pack years: 5.00     Types: Cigarettes   • Smokeless tobacco: Never   Vaping Use   • Vaping Use: Never used   Substance Use Topics   • Alcohol use: No   • Drug use: Not Currently     Medications Prior to Admission   Medication Sig Dispense Refill Last Dose   • brompheniramine-pseudoephedrine-DM 30-2-10 MG/5ML syrup Take 5 mL by mouth 4 (Four) Times a Day As Needed for Congestion, Cough or Allergies. 473 mL 0    • buprenorphine (SUBUTEX) 8 MG sublingual tablet SL tablet 8 mg 3 (Three) Times  a Day.      • clonazePAM (KlonoPIN) 0.5 MG tablet Take 0.5 mg by mouth 2 (Two) Times a Day As Needed for Anxiety.      • FLUoxetine (PROzac) 40 MG capsule Take 40 capsules by mouth Daily.      • fluticasone (Flonase) 50 MCG/ACT nasal spray 2 sprays into the nostril(s) as directed by provider Daily. 2 puffs each nostril 18.2 mL 0    • lamoTRIgine (LaMICtal) 200 MG tablet Take 200 mg by mouth 2 (Two) Times a Day.      • LATUDA 60 MG tablet tablet 60 mg Daily.      • valACYclovir (VALTREX) 1000 MG tablet Take 0.5 tablets by mouth 2 (Two) Times a Day. 30 tablet 1      Allergies:  Patient has no known allergies.    Objective      Vital Signs  Temp:  [97.6 °F (36.4 °C)-98.2 °F (36.8 °C)] 98.1 °F (36.7 °C)  Heart Rate:  [66-92] 67  Resp:  [16-18] 16  BP: (101-153)/(48-84) 101/56    Physical Exam:      General Appearance:    Alert, cooperative, in no acute distress   Head:    Normocephalic, without obvious abnormality, atraumatic   Eyes:            Lids and lashes normal, conjunctivae and sclerae normal, no   icterus, no pallor, corneas clear, PERRLA   Ears:    Ears appear intact with no abnormalities noted   Neck:   No adenopathy, supple, trachea midline   Back:     No kyphosis present, no scoliosis present, no skin lesions,      erythema or scars, no tenderness to percussion or                   palpation,   range of motion normal   Lungs:     Clear to auscultation,respirations regular, even and                  unlabored    Heart:    Regular rhythm and normal rate, normal S1 and S2, no            murmur, no gallop, no rub, no click   Chest Wall:    No abnormalities observed   Abdomen:    Soft slightly overweight no organomegaly masses she has a bulge at her umbilicus with slight erythema to the skin and very tender but otherwise her abdomen is soft and nontender good bowel sounds   Rectal:     Deferred   Extremities:   Moves all extremities well, no edema, no cyanosis, no             redness   Pulses:   Pulses palpable  and equal bilaterally   Skin:   No bleeding, bruising or rash   Lymph nodes:   No palpable adenopathy   Neurologic:   No focal deficits       Results Review:      Lab Results (last 72 hours)     Procedure Component Value Units Date/Time    Hepatitis Panel, Acute [[id number]]  (Abnormal) Collected: 02/10/23 1903    Specimen: Blood Updated: 02/10/23 2053     Hepatitis B Surface Ag Non-Reactive     Hep A IgM Non-Reactive     Hep B C IgM Non-Reactive     Hepatitis C Ab Reactive    Narrative:      Results may be falsely decreased if patient taking Biotin.     [place] Draw [[id number]] Collected: 02/10/23 1551    Specimen: Blood Updated: 02/10/23 2001    Narrative:      The following orders were created for panel order [place] Draw.  Procedure                               Abnormality         Status                     ---------                               -----------         ------                     Green Top (Gel)[[id number]]                                  Final result               Lavender Top[[id number]]                                     Final result               Kay Top[[id number]]                                         Final result               Light Blue Top[[id number]]                                   Final result                 Please view results for these tests on the individual orders.    Kay Top [[id number]] Collected: 02/10/23 1551    Specimen: Blood Updated: 02/10/23 2001     Extra Tube Hold for add-ons.     Comment: Auto resulted.       Salicylate Level [[id number]]  (Normal) Collected: 02/10/23 1551    Specimen: Blood Updated: 02/10/23 1921     Salicylate <0.3 mg/dL     Acetaminophen Level [[id number]]  (Normal) Collected: 02/10/23 1551    Specimen: Blood Updated: 02/10/23 1921     Acetaminophen <5.0 mcg/mL     COVID-19, FLU A/B, RSV PCR - Swab, Nasopharynx [[id number]]  (Normal) Collected: 02/10/23 1639    Specimen: Swab from Nasopharynx Updated: 02/10/23 1827     COVID19 Not Detected     Influenza  A PCR Not Detected     Influenza B PCR Not Detected     RSV, PCR Not Detected    Narrative:      Fact sheet for providers: https://www.fda.gov/media/063745/download    Fact sheet for patients: https://www.fda.gov/media/831908/download    Test performed by PCR.    hCG, Serum, Qualitative [[id number]]  (Normal) Collected: 02/10/23 1640    Specimen: Blood Updated: 02/10/23 1719     HCG Qualitative Negative    Urinalysis, Microscopic Only - Urine, Clean Catch [[id number]]  (Abnormal) Collected: 02/10/23 1639    Specimen: Urine, Clean Catch Updated: 02/10/23 1706     RBC, UA 3-5 /HPF      WBC, UA 3-5 /HPF      Comment: Urine culture not indicated.        Bacteria, UA 1+ /HPF      Squamous Epithelial Cells, UA 3-6 /HPF      Hyaline Casts, UA 0-2 /LPF      Methodology Automated Microscopy    Urinalysis With Culture If Indicated - Urine, Clean Catch [[id number]]  (Abnormal) Collected: 02/10/23 1639    Specimen: Urine, Clean Catch Updated: 02/10/23 1706     Color, UA Yellow     Appearance, UA Clear     pH, UA 7.0     Specific Gravity, UA 1.017     Glucose, UA Negative     Ketones, UA Negative     Bilirubin, UA Negative     Blood, UA Negative     Protein, UA Negative     Leuk Esterase, UA Small (1+)     Nitrite, UA Negative     Urobilinogen, UA 1.0 E.U./dL    Narrative:      In absence of clinical symptoms, the presence of pyuria, bacteria, and/or nitrites on the urinalysis result does not correlate with infection.    Lavender Top [[id number]] Collected: 02/10/23 1551    Specimen: Blood Updated: 02/10/23 1700     Extra Tube hold for add-on     Comment: Auto resulted       Light Blue Top [[id number]] Collected: 02/10/23 1551    Specimen: Blood Updated: 02/10/23 1700     Extra Tube Hold for add-ons.     Comment: Auto resulted       Green Top (Gel) [[id number]] Collected: 02/10/23 1551    Specimen: Blood Updated: 02/10/23 1700     Extra Tube Hold for add-ons.     Comment: Auto resulted.       Procalcitonin [[id number]]  (Normal)  "Collected: 02/10/23 1551    Specimen: Blood Updated: 02/10/23 1644     Procalcitonin 0.09 ng/mL     Narrative:      As a Marker for Sepsis (Non-Neonates):    1. <0.5 ng/mL represents a low risk of severe sepsis and/or septic shock.  2. >2 ng/mL represents a high risk of severe sepsis and/or septic shock.    As a Marker for Lower Respiratory Tract Infections that require antibiotic therapy:    PCT on Admission    Antibiotic Therapy       6-12 Hrs later    >0.5                Strongly Recommended  >0.25 - <0.5        Recommended   0.1 - 0.25          Discouraged              Remeasure/reassess PCT  <0.1                Strongly Discouraged     Remeasure/reassess PCT    As 28 day mortality risk marker: \"Change in Procalcitonin Result\" (>80% or <=80%) if Day 0 (or Day 1) and Day 4 values are available. Refer to http://www.[employer]-pct-calculator.com    Change in PCT <=80%  A decrease of PCT levels below or equal to 80% defines a positive change in PCT test result representing a higher risk for 28-day all-cause mortality of patients diagnosed with severe sepsis for septic shock.    Change in PCT >80%  A decrease of PCT levels of more than 80% defines a negative change in PCT result representing a lower risk for 28-day all-cause mortality of patients diagnosed with severe sepsis or septic shock.       Comprehensive Metabolic Panel [[id number]]  (Abnormal) Collected: 02/10/23 1551    Specimen: Blood Updated: 02/10/23 1637     Glucose 108 mg/dL      BUN 7 mg/dL      Creatinine 0.72 mg/dL      Sodium 139 mmol/L      Potassium 4.1 mmol/L      Comment: Slight hemolysis detected by analyzer. Results may be affected.        Chloride 100 mmol/L      CO2 26.0 mmol/L      Calcium 9.3 mg/dL      Total Protein 7.4 g/dL      Albumin 4.4 g/dL      ALT (SGPT) 152 U/L      AST (SGOT) 121 U/L      Alkaline Phosphatase 122 U/L      Total Bilirubin 0.3 mg/dL      Globulin 3.0 gm/dL      A/G Ratio 1.5 g/dL      BUN/Creatinine Ratio 9.7     Anion " Gap 13.0 mmol/L      eGFR 108.6 mL/min/1.73     Narrative:      GFR Normal >60  Chronic Kidney Disease <60  Kidney Failure <15      Lactic Acid, Plasma [[id number]]  (Normal) Collected: 02/10/23 1551    Specimen: Blood Updated: 02/10/23 1635     Lactate 1.2 mmol/L     CBC & Differential [[id number]]  (Abnormal) Collected: 02/10/23 1551    Specimen: Blood Updated: 02/10/23 1622    Narrative:      The following orders were created for panel order CBC & Differential.  Procedure                               Abnormality         Status                     ---------                               -----------         ------                     CBC Auto Differential[[id number]]        Abnormal            Final result                 Please view results for these tests on the individual orders.    CBC Auto Differential [[id number]]  (Abnormal) Collected: 02/10/23 1551    Specimen: Blood Updated: 02/10/23 1622     WBC 8.28 10*3/mm3      RBC 4.52 10*6/mm3      Hemoglobin 13.8 g/dL      Hematocrit 42.3 %      MCV 93.6 fL      MCH 30.5 pg      MCHC 32.6 g/dL      RDW 11.9 %      RDW-SD 41.6 fl      MPV 9.3 fL      Platelets 180 10*3/mm3      Neutrophil % 57.5 %      Lymphocyte % 31.0 %      Monocyte % 6.2 %      Eosinophil % 4.5 %      Basophil % 0.6 %      Immature Grans % 0.2 %      Neutrophils, Absolute 4.76 10*3/mm3      Lymphocytes, Absolute 2.57 10*3/mm3      Monocytes, Absolute 0.51 10*3/mm3      Eosinophils, Absolute 0.37 10*3/mm3      Basophils, Absolute 0.05 10*3/mm3      Immature Grans, Absolute 0.02 10*3/mm3      nRBC 0.0 /100 WBC         Imaging Results (Last 72 Hours)     Procedure Component Value Units Date/Time    CT Abdomen Pelvis With Contrast [[id number]] Collected: 02/10/23 1740     Updated: 02/10/23 1749    Narrative:      CT ABDOMEN PELVIS W CONTRAST- 2/10/2023 5:30 PM CST     HISTORY: umbilical protrusion, tender/red; no BM or flatus in 3 days      COMPARISON: 01/18/2012     DOSE LENGTH PRODUCT: 265 mGy cm.  Automated exposure control was also  utilized to decrease patient radiation dose.     TECHNIQUE: Axial images of the abdomen and pelvis are performed  following IV contrast     FINDINGS:  There is no suspicious focal liver or splenic mass. Spleen is  similar in size. No pancreatic cyst or mass identified. No  peripancreatic inflammation. Gallbladder is unremarkable. No adrenal  nodule. 3 mm nonobstructing mid left intrarenal stone with mild cortical  thinning of the superior left kidney no enhancing renal mass. No  hydronephrosis. Bladder appears intact. Uterus is unremarkable. No  adnexal mass.     There is a left periumbilical hernia defect which does contain  peritoneal versus omental with mild stranding of the within the hernia  sac and small volume nonloculated fluid of the hernia sac. Finding is  concerning for incarceration. Correlation for reducibility recommended.  There is no herniation of bowel into the hernia sac. No evidence for  bowel obstruction. No free air or abscess. Moderate fecal stasis. Mild  colonic diverticulosis. Patent abdominal aorta with no aneurysm or  dissection. No pathologic lymphadenopathy.     The visible lung bases demonstrate mild atelectasis. No focal  destructive regional osseous lesions. Degenerative changes greatest at  the L4/L5 level.       Impression:      1. There is a left periumbilical hernia defect containing peritoneal fat  or omentum with stranding of the fat within the hernia sac and small  volume nonloculated fluid. Findings concerning for potential  incarceration. Correlation for reducibility recommended. No herniation  of bowel. No bowel obstruction.  2. 3 mm nonobstructing mid left intrarenal stone with scarring of the  superior left renal cortex. No ureteral stones or hydronephrosis.  This report was finalized on 02/10/2023 17:45 by Dr. Jaz Rushing MD.          Assessment & Plan       Incarcerated umbilical hernia      I reviewed her CT scan.  She has a  "incarcerated umbilical hernia with omentum without bowel incarcerated in her hernia.  I reviewed her labs    Plan #1 incarcerated umbilical hernia- we will proceed with repair of incarcerated umbilical hernia with possible resection of omentum.  The risks of bleeding infection injury to surrounding structures difficulty due to the incarceration nature of the hernia as well as the possibility that the umbilical stalk does not stay attached to the closed fascia all were discussed with the patient.    2.  Hepatitis C- she is aware of this diagnosis but has not done anything about it.  She does want to look into treatment as an outpatient.      Mercedez Salgado MD  02/11/23  07:55 CST          Electronically signed by Mercedez Salgado MD at 02/11/23 4208          Emergency Department Notes      Derrell Escalera PA-C at 02/10/23 4960          Subjective   History of Present Illness    Patient is a 42-year-old female presenting to ED with abdominal pain and concern for umbilical hernia.  PMH significant for anxiety, depression, History hepatitis C, appendectomy, history lithotripsies.  Patient states 2 years ago during her pregnancy she developed umbilical \"nerve pains.\"  Patient states after delivering she was advised that she may have an umbilical hernia and was recommended by her OB/GYN to follow-up with a surgeon however she never did.  Patient states that she would still have some mild intermittent pains and very mild swelling however it was never significantly uncomfortable.  Patient states 3 days ago she went to lift her arms up into the air when she felt a sudden pain and protrusion in her umbilical region.  Patient states since that time it has become progressively increased in size, swelling, tenderness, this morning she woke up with redness overlying it.  Patient states that the first couple days it was difficult to have a bowel movement due to increased pain however the past 2 days she is no longer able to have " any bowel movements, no diarrhea stool, and is no longer passing gas.  Patient reports nausea due to the pain but denies any vomiting.  Patient reports a subjective fever today but denies any diaphoresis or chills.  Patient denies any other trauma including blunt trauma to her abdomen or any other new lifting/twisting/bending or abdominal exercises.  Patient denies use of any medications for her symptoms and presents at this time for further evaluation.    Patient last ate yogurt at 9 AM with no food since    Records reviewed show patient most recently seen outpatient at urgent care on 2/26/2021 for upper respiratory tract infection, acute nonrecurrent pansinusitis, right-sided otitis media, cough.    Patient last seen in the ED on 7/23/2020 for dental caries, gingivitis.    Review of Systems   Constitutional: Positive for fever (subjective). Negative for chills and diaphoresis.   HENT: Negative.    Eyes: Negative.    Respiratory: Negative.    Cardiovascular: Negative.    Gastrointestinal: Positive for abdominal pain (umbilical), constipation and nausea. Negative for diarrhea and vomiting.   Genitourinary: Negative.  Negative for dysuria and flank pain.   Musculoskeletal: Negative.    Skin: Positive for color change (redness toumbilical protrusion).   Neurological: Negative.    Psychiatric/Behavioral: Negative.    All other systems reviewed and are negative.      Past Medical History:   Diagnosis Date   • Anxiety    • Bipolar affective disorder (HCC)    • Depression    • Gestational hypertension    • Hepatitis C    • Kidney infection    • PTSD (post-traumatic stress disorder)        No Known Allergies    Past Surgical History:   Procedure Laterality Date   • APPENDECTOMY     • CYSTOSCOPY W/ URETERAL STENT PLACEMENT Left 10/6/2018    Procedure: CYSTOSCOPY LEFT URETERAL STENT INSERTION;  Surgeon: Tyrone Machuca MD;  Location[facility];  Service: Urology   • TONSILLECTOMY     • URETEROSCOPY LASER LITHOTRIPSY WITH  STENT INSERTION Left 10/17/2018    Procedure: URETEROSCOPY LASER LITHOTRIPSY WITH STENT EXCHANGE STONE EXTRACTION;  Surgeon: Tyrone Machuca MD;  Location: [facility];  Service: Urology       Family History   Problem Relation Age of Onset   • Mental illness Mother    • Drug abuse Mother        Social History     Socioeconomic History   • Marital status: Significant Other     Spouse name: JOVON SCHREIBER   Tobacco Use   • Smoking status: Every Day     Packs/day: 0.25     Years: 20.00     Pack years: 5.00     Types: Cigarettes   • Smokeless tobacco: Never   Vaping Use   • Vaping Use: Never used   Substance and Sexual Activity   • Alcohol use: No   • Drug use: Not Currently   • Sexual activity: Yes     Partners: Male     Birth control/protection: None           Objective   Physical Exam  Vitals and nursing note reviewed.   Constitutional:       General: She is in acute distress (appears uncomfortable due to pain).      Appearance: Normal appearance. She is well-developed and well-groomed. She is not toxic-appearing or diaphoretic.   HENT:      Head: Normocephalic.      Mouth/Throat:      Mouth: Mucous membranes are moist.      Pharynx: Oropharynx is clear.   Eyes:      Extraocular Movements: Extraocular movements intact.      Conjunctiva/sclera: Conjunctivae normal.      Pupils: Pupils are equal, round, and reactive to light.   Cardiovascular:      Rate and Rhythm: Regular rhythm. Tachycardia present.   Pulmonary:      Effort: Pulmonary effort is normal.      Breath sounds: Normal breath sounds.   Abdominal:      General: There is no distension.      Palpations: Abdomen is soft.      Tenderness: There is abdominal tenderness.      Hernia: A hernia is present. Hernia is present in the umbilical area (Tenderness and erythema overlying with no skin wounds open).   Musculoskeletal:         General: Normal range of motion.      Cervical back: Normal range of motion and neck supple.   Skin:     General: Skin is warm and dry.       Findings: Erythema (as described in abdominal section) present.   Neurological:      Mental Status: She is alert and oriented to person, place, and time.      Gait: Gait normal.   Psychiatric:         Attention and Perception: Attention normal.         Mood and Affect: Mood is anxious. Affect is tearful.         Speech: Speech normal.         Behavior: Behavior is cooperative.         Procedures          ED Course  ED Course as of 02/10/23 1935   Fri Feb 10, 2023   1845 sukhi [JS]      ED Course User Index  [JS] Derrell Escalera PA-C                                           Medical Decision Making  Elevated LFTs: acute illness or injury  Incarcerated umbilical hernia: chronic illness or injury  Amount and/or Complexity of Data Reviewed  External Data Reviewed: labs, radiology and notes.  Labs: ordered. Decision-making details documented in ED Course.  Radiology: ordered. Decision-making details documented in ED Course.  ECG/medicine tests: ordered. Decision-making details documented in ED Course.  Discussion of management or test interpretation with external provider(s): Dr. Salgado (general surgeon)    Risk  Prescription drug management.  Decision regarding hospitalization.            Patient is a 42-year-old female presenting to ED with abdominal pain and concern for umbilical hernia.  PMH significant for anxiety, depression, History hepatitis C, appendectomy, history lithotripsies. Lab work revealed no abnormalities to CBC with no white blood cell count, stable H&H.  CMP showed , , alk phos 122 with normal bilirubin.  No renal dysfunction, no electrolyte abnormalities.  Salicylate and Acetaminophen levels negative. Hepatic panel obtained.  Low concern for systemic infection with normal lactic acid, normal procalcitonin.  Urinalysis with small leukocytes, 1+ bacteria, 3-5 WBC however 3-6 squamous epithelium.  Patient was given 1 g IV Rocephin and advised that her urine would be sent for  culture for further determination of infection.  COVID, influenza, RSV testing negative.  Pregnancy testing negative.  CT imaging of the abdomen and pelvis showed: Left periumbilical hernia defect containing peritoneal fat or omentum with stranding of the fat within the hernia sac and small volume nonloculated fluid concerning for potential incarceration, no herniation of bowel, no bowel obstruction, 3 mm nonobstructing mid left intrarenal stone with scarring of the superior left renal cortex, no ureteral stones or hydronephrosis.  Case was discussed with Dr. Houston, general surgeon, who will, except patient for admission under his services for surgical intervention tomorrow.  Advised patient of need for admission for further surgical treatment for which she is amenable.  Patient was initially given morphine with no pain relief, a dose of Dilaudid with only temporary pain relief, and Vistaril with only temporary relief of her anxiety.  Will order additional dose of Dilaudid, Ativan, and advised that we can apply topical lidocaine for further pain relief.  Patient is amenable with no further questions, concerns, or needs.    Final diagnoses:   Incarcerated umbilical hernia   Elevated LFTs       ED Disposition  ED Disposition     ED Disposition   Decision to Admit    Condition   --    Comment   Level of Care: Med/Surg [1]   Diagnosis: Incarcerated umbilical hernia [[id number]]   Admitting Physician: ISAAK DOMINGO [[id number]]               No follow-up provider specified.       Medication List      No changes were made to your prescriptions during this visit.          Derrell Escalera PA-C  02/10/23 1935      Electronically signed by Derrell Escalera PA-C at 02/10/23 1935         No current facility-administered medications for this encounter.     Current Outpatient Medications   Medication Sig Dispense Refill   • brompheniramine-pseudoephedrine-DM 30-2-10 MG/5ML syrup Take 5 mL by mouth 4 (Four) Times a Day As Needed for  Congestion, Cough or Allergies. 473 mL 0   • buprenorphine (SUBUTEX) 8 MG sublingual tablet SL tablet 8 mg 3 (Three) Times a Day.     • clonazePAM (KlonoPIN) 0.5 MG tablet Take 0.5 mg by mouth 2 (Two) Times a Day As Needed for Anxiety.     • FLUoxetine (PROzac) 40 MG capsule Take 40 capsules by mouth Daily.     • fluticasone (Flonase) 50 MCG/ACT nasal spray 2 sprays into the nostril(s) as directed by provider Daily. 2 puffs each nostril 18.2 mL 0   • lamoTRIgine (LaMICtal) 200 MG tablet Take 200 mg by mouth 2 (Two) Times a Day.     • LATUDA 60 MG tablet tablet 60 mg Daily.     • oxyCODONE-acetaminophen (Percocet)  MG per tablet Take 1 tablet by mouth Every 4 (Four) Hours As Needed for Moderate Pain (Pain). 30 tablet 0   • valACYclovir (VALTREX) 1000 MG tablet Take 0.5 tablets by mouth 2 (Two) Times a Day. 30 tablet 1        Operative/Procedure Notes (last 72 hours)      Mercedez Salgado MD at 02/11/23 0920        VENTRAL/INCISIONAL HERNIA REPAIR  Procedure Note    Arin De La Cruz  2/11/2023    Pre-op Diagnosis:   Incarcerated 3 cm umbilical hernia    Post-op Diagnosis:     same    Procedure/CPT® Codes:      Procedure(s):  INCARCERATED VENTRAL HERNIA REPAIR    Surgeon(s):  Mercedez Salgado MD    Anesthesia: General    Staff:   Circulator: David Trevino RN; Renee Mccoy RN  Scrub Person: Kaykay Dumont; Donnie Caceres    Estimated Blood Loss: minimal    Specimens:                none        Findings: Incarcerated with omentum that we excised.  Defect measured 3 cm.  Placed a 6.4 cm Ventralex mesh    Complications: none    Description of Procedure:  The patient was brought to the operating room and placed in the supine position. After the induction of general anesthesia and infusion of IV antibiotics, the patient was prepped and draped in the usual sterile fashion. A semicircular incision was made around the umbilicus.  Subcutaneous tissue was dissected with electrocautery down to the fascia.  The  umbilical stalk and hernia were circumferentially dissected and isolated.  The umbilical stalk and hernia was amputated at the level of the fascia.  I excised the incarcerated omentum as it was a little dusky.  The hernia sac was debrided from the umbilicus.  The mesh was soaked in antibiotic solution, positioned underneath the fascia.  Four-quadrant stay sutures were placed with interrupted 0 Prolenes at 12:00 6:00 3:00 and 9:00.  The fascia was closed using interrupted 0 Prolene incorporating bites of the tabs of the mesh.  The four-quadrant stay sutures were then tied.  Marcaine  was injected into the closed fascia.  The umbilical stalk was tacked to the closed fascia with interrupted 2-0 Vicryl.  The skin was reapproximated 2-0 Vicryl and running 4-0 Vicryl subcuticular. Dressings were placed.  The patient was transferred to the recovery room in stable condition having tolerated the procedure well.  At the end of the procedure all counts were correct.    Mercedez Salgado MD     Date: 2/11/2023  Time: 10:18 CST      Electronically signed by Mercedez Salgado MD at 02/11/23 1022          Discharge Summary      Mercedez Salgado MD at 02/11/23 1030          Mercedez Salgado MD Discharge Summary    Date of Admission: 2/10/2023  Date of Discharge:  2/11/2023    Discharge Diagnosis: Incarcerated umbilical hernia    Presenting Problem/History of Present Illness    History and Physical as outlined in the chart    Hospital Course  Patient was admitted and underwent the above operation.  Hospital course  was uneventful.  Patient will be discharged to home.  See medication reconciliation for medications at discharge.    Procedures Performed  Procedure(s):  INCARCERATED VENTRAL HERNIA REPAIR       Consults:   Consults     No orders found for last 30 day(s).          Condition on Discharge:  stable      Discharge Disposition  Home or Self Care    Discharge Medications     Discharge Medications      New Medications       Instructions Start Date   oxyCODONE-acetaminophen  MG per tablet  Commonly known as: Percocet   1 tablet, Oral, Every 4 Hours PRN         Continue These Medications      Instructions Start Date   brompheniramine-pseudoephedrine-DM 30-2-10 MG/5ML syrup   5 mL, Oral, 4 Times Daily PRN      buprenorphine 8 MG sublingual tablet SL tablet  Commonly known as: SUBUTEX   8 mg, 3 Times Daily      clonazePAM 0.5 MG tablet  Commonly known as: KlonoPIN   0.5 mg, Oral, 2 Times Daily PRN      FLUoxetine 40 MG capsule  Commonly known as: PROzac   40 capsules, Oral, Daily      fluticasone 50 MCG/ACT nasal spray  Commonly known as: Flonase   2 sprays, Nasal, Daily, 2 puffs each nostril      lamoTRIgine 200 MG tablet  Commonly known as: LaMICtal   200 mg, Oral, 2 Times Daily      Latuda 60 MG tablet tablet  Generic drug: lurasidone HCl   60 mg, Daily      valACYclovir 1000 MG tablet  Commonly known as: VALTREX   500 mg, Oral, 2 Times Daily             Discharge Diet:     Activity at Discharge:   Activity Instructions     Discharge Activity      No driving while on pain medications  Restrict lifting to 10 lbs for 4 weeks          Follow-up Appointments  No future appointments.  Additional Instructions for the Follow-ups that You Need to Schedule     Discharge Follow-up with Specified Provider: me; 3 Weeks   As directed      To: me    Follow Up: 3 Weeks               Test Results Pending at Discharge  Pending Labs     Order Current Status    Tissue Pathology Exam Collected (02/11/23 0828)           Mercedez Salgado MD  02/11/23  10:30 CST    Time: Time spent at discharge 30 minutes             Electronically signed by Mercedez Salgado MD at 02/11/23 5532

## 2023-02-14 LAB
CYTO UR: NORMAL
LAB AP CASE REPORT: NORMAL
Lab: NORMAL
PATH REPORT.FINAL DX SPEC: NORMAL
PATH REPORT.GROSS SPEC: NORMAL

## 2023-02-17 RX ORDER — NALOXONE HYDROCHLORIDE 4 MG/.1ML
1 SPRAY NASAL AS NEEDED
Qty: 1 EACH | Refills: 0 | Status: SHIPPED | OUTPATIENT
Start: 2023-02-17

## 2023-02-17 RX ORDER — HYDROCODONE BITARTRATE AND ACETAMINOPHEN 7.5; 325 MG/1; MG/1
1 TABLET ORAL EVERY 4 HOURS PRN
Qty: 30 TABLET | Refills: 0 | Status: SHIPPED | OUTPATIENT
Start: 2023-02-17

## 2023-02-23 ENCOUNTER — OFFICE VISIT (OUTPATIENT)
Age: 43
End: 2023-02-23
Payer: MEDICAID

## 2023-02-23 VITALS
RESPIRATION RATE: 18 BRPM | HEIGHT: 66 IN | HEART RATE: 100 BPM | TEMPERATURE: 97.5 F | SYSTOLIC BLOOD PRESSURE: 111 MMHG | OXYGEN SATURATION: 97 % | DIASTOLIC BLOOD PRESSURE: 68 MMHG | WEIGHT: 167 LBS | BODY MASS INDEX: 26.84 KG/M2

## 2023-02-23 DIAGNOSIS — B00.1 FEVER BLISTER: Primary | ICD-10-CM

## 2023-02-23 PROCEDURE — 99213 OFFICE O/P EST LOW 20 MIN: CPT | Performed by: NURSE PRACTITIONER

## 2023-02-23 RX ORDER — ACYCLOVIR 50 MG/G
OINTMENT TOPICAL
Qty: 15 G | Refills: 0 | Status: SHIPPED | OUTPATIENT
Start: 2023-02-23 | End: 2023-03-02

## 2023-02-23 RX ORDER — VALACYCLOVIR HYDROCHLORIDE 1 G/1
1000 TABLET, FILM COATED ORAL DAILY
Qty: 5 TABLET | Refills: 0 | Status: SHIPPED | OUTPATIENT
Start: 2023-02-23 | End: 2023-02-28

## 2023-02-23 ASSESSMENT — ENCOUNTER SYMPTOMS
EYES NEGATIVE: 1
RESPIRATORY NEGATIVE: 1
ALLERGIC/IMMUNOLOGIC NEGATIVE: 1
GASTROINTESTINAL NEGATIVE: 1

## 2023-02-23 NOTE — PATIENT INSTRUCTIONS
Take Valtrex as prescribed. Apply Abreva cream as prescribed. Avoid touching and wash hands after touching blisters. Follow-up with PCP as needed. May discuss daily suppressant if needed.

## 2023-02-23 NOTE — PROGRESS NOTES
Rafita Douglas (:  1980) is a 38 y.o. female,Established patient, here for evaluation of the following chief complaint(s): Other (Blisters on lip and face)    Patient presents today complaining of fever blisters on her lips and left cheek. The blisters began yesterday. She states she has a long history of herpes blisters, and usually has a prescription of Valtrex to take when she first notices one beginning. She contacted her PCP yesterday, but has not gotten a refill. She would also like some Abreva cream. She states she has been under a lot of stress recently. Explained to patient I will prescribe Valtrex and Abreva cream to apply. She may want to discuss a daily suppressant with her PCP. Care instructions discussed. Patient verbalized understanding and agrees to plan of care. ASSESSMENT/PLAN:  1. Fever blister   Orders Placed This Encounter   Medications    valACYclovir (VALTREX) 1 g tablet     Sig: Take 1 tablet by mouth daily for 5 days     Dispense:  5 tablet     Refill:  0    acyclovir (ZOVIRAX) 5 % ointment     Sig: Apply topically every 3 hours. Dispense:  15 g     Refill:  0        Return if symptoms worsen or fail to improve. Subjective   SUBJECTIVE/OBJECTIVE:  Other      Review of Systems   Constitutional: Negative. HENT:  Positive for mouth sores. Eyes: Negative. Respiratory: Negative. Cardiovascular: Negative. Gastrointestinal: Negative. Endocrine: Negative. Genitourinary: Negative. Musculoskeletal: Negative. Skin: Negative. Allergic/Immunologic: Negative. Neurological: Negative. Hematological: Negative. Psychiatric/Behavioral: Negative. Objective   Physical Exam  Vitals reviewed. HENT:      Head:        Comments: Large clusters of fever blisters are present on the mid upper lip, lower left lip, and a nickel sized area on the left cheek. Neurological:      Mental Status: She is alert.           Patient Instructions     Take Valtrex as prescribed. Apply Abreva cream as prescribed. Avoid touching and wash hands after touching blisters. Follow-up with PCP as needed. May discuss daily suppressant if needed. An electronic signature was used to authenticate this note. --Claryce Nyhan, APRN - CNP     EMR Dragon/translation disclaimer: Much of this encounter note is an electronic transcription/translation of spoken language to printed text. The electronic translation of spoken language may be erroneous, or at times, nonsensical words or phrases may be inadvertently transcribed.   Although I have reviewed the note for such errors, some may still exist.

## 2023-09-27 NOTE — ED PROVIDER NOTES
[facility] EMERGENCY DEPT  eMERGENCY dEPARTMENT eNCOUnter      Pt Name: Jhon Bunn  MRN: [id number]  [place] 1980  Date of evaluation: 1/9/2023  Provider: Colt Ayala MD    CHIEF COMPLAINT       Chief Complaint   Patient presents with    Addiction Problem     Pt was released from senior living today. Brought here by SO for help with withdrawal symptoms before going to a detox facility. Assumed care at end of shift pending dispo to rehab. SO here to take patient to rehab.     PHYSICAL EXAM    (up to 7 for level 4, 8 or more for level 5)     ED Triage Vitals [01/09/23 1821]   BP Temp Temp Source Heart Rate Resp SpO2 Height Weight   (!) 149/99 99 °F (37.2 °C) Oral 74 18 96 % 5' 6\" (1.676 m) 165 lb (74.8 kg)       Physical Exam    DIAGNOSTIC RESULTS     EKG: All EKG's are interpreted by theEmergency Department Physician who either signs or Co-signs this chart in the absence of a cardiologist.        RADIOLOGY:   Non-plain film images such as CT, Ultrasound and MRI are read by the radiologist. Plain radiographic images are visualized and preliminarily interpreted by the emergencyphysician with the below findings:        Interpretation per the Radiologist below, if available at the time of thisnote:    No orders to display         ED BEDSIDE ULTRASOUND:   Performed by ED Physician - none    LABS:  Labs Reviewed   CBC WITH AUTO DIFFERENTIAL - Abnormal; Notable for the following components:       Result Value    MCH 32.1 (*)     MCHC 32.8 (*)     Eosinophils % 6.5 (*)     All other components within normal limits   COMPREHENSIVE METABOLIC PANEL - Abnormal; Notable for the following components:    BUN 5 (*)     ALT 34 (*)     All other components within normal limits   DRUG SCRN, BUPRENORPHINE - Abnormal; Notable for the following components:    Amphetamine Screen, Urine POSITIVE (*)     Benzodiazepine Screen, Urine POSITIVE (*)     Cannabinoid Scrn, Ur POSITIVE (*)     Buprenorphine Urine POSITIVE (*) Methamphetamine, Urine POSITIVE (*)     All other components within normal limits   ACETAMINOPHEN LEVEL - Abnormal; Notable for the following components:    Acetaminophen Level <5 (*)     All other components within normal limits   SALICYLATE LEVEL - Abnormal; Notable for the following components:    Salicylate, Serum <6.0 (*)     All other components within normal limits   COVID-19, RAPID   ETHANOL   HCG, SERUM, QUALITATIVE       All other labs were within normal range or not returned as of this dictation. EMERGENCY DEPARTMENT COURSE and DIFFERENTIAL DIAGNOSIS/MDM:   Vitals:    Vitals:    01/09/23 2045 01/09/23 2215 01/09/23 2319 01/10/23 0513   BP: 129/76 122/76 117/69 108/78   Pulse: 72 75 71 67   Resp: 18 15 20 17   Temp:       TempSrc:       SpO2: 93% 94% 93% 96%   Weight:       Height:           MDM        CONSULTS:  None    PROCEDURES:  Unless otherwise noted below, none      Procedures    FINAL IMPRESSION      1. Polysubstance abuse ([facility] [address]          DISPOSITION/PLAN   DISPOSITION     PATIENT REFERRED TO:  No follow-up provider specified.     DISCHARGE MEDICATIONS:  New Prescriptions    No medications on file          (Please note that portions of this note were completed with a voice recognition program.  Efforts were made to edit the dictations but occasionally words aremis-transcribed.)    Shawn Johnson MD (electronically signed)  Attending Emergency Physician           Shawn Johnson MD  01/10/23 [phone] Skin Substitute Text: The defect edges were debeveled with a #15 scalpel blade.  Given the location of the defect, shape of the defect and the proximity to free margins a skin substitute graft was deemed most appropriate.  The graft material was trimmed to fit the size of the defect. The graft was then placed in the primary defect and oriented appropriately.

## 2024-01-04 NOTE — PROGRESS NOTES
Patient Patient seen and evaluated at bedside. She reports she is feeling much better after her epidural. She was given ephedrine due to hypotension and required epidural placement twice  SVE: 304/80/-2 AROM, clear fluid   FHR: moderate variability, + accelerations   [place]: irregular   Discussed plan with patient and support person pending fetal status.
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
## 2024-06-18 PROCEDURE — 87086 URINE CULTURE/COLONY COUNT: CPT | Performed by: NURSE PRACTITIONER

## 2024-08-03 ENCOUNTER — OFFICE VISIT (OUTPATIENT)
Age: 44
End: 2024-08-03
Payer: COMMERCIAL

## 2024-08-03 VITALS
OXYGEN SATURATION: 96 % | WEIGHT: 177.2 LBS | SYSTOLIC BLOOD PRESSURE: 158 MMHG | BODY MASS INDEX: 28.6 KG/M2 | TEMPERATURE: 97.6 F | RESPIRATION RATE: 18 BRPM | DIASTOLIC BLOOD PRESSURE: 100 MMHG | HEART RATE: 61 BPM

## 2024-08-03 DIAGNOSIS — L25.9 CONTACT DERMATITIS, UNSPECIFIED CONTACT DERMATITIS TYPE, UNSPECIFIED TRIGGER: Primary | ICD-10-CM

## 2024-08-03 PROCEDURE — 99213 OFFICE O/P EST LOW 20 MIN: CPT | Performed by: NURSE PRACTITIONER

## 2024-08-03 RX ORDER — TRIAMCINOLONE ACETONIDE 0.25 MG/G
OINTMENT TOPICAL
Qty: 15 G | Refills: 0 | Status: SHIPPED | OUTPATIENT
Start: 2024-08-03 | End: 2024-08-10

## 2024-08-03 ASSESSMENT — ENCOUNTER SYMPTOMS
SINUS PRESSURE: 0
TROUBLE SWALLOWING: 0
EYE PAIN: 0
ABDOMINAL DISTENTION: 0
SHORTNESS OF BREATH: 0
CHEST TIGHTNESS: 0
COLOR CHANGE: 0
WHEEZING: 0
SORE THROAT: 0
COUGH: 0
ABDOMINAL PAIN: 0
STRIDOR: 0
EYE DISCHARGE: 0

## 2024-08-03 NOTE — PATIENT INSTRUCTIONS
No steroid due to BP  Triamcinolone sent  Patient will follow up with her PCP next week regarding BP  Benadryl OTC  Go to ER for worrisome symptoms      Patient verbalized understanding and agrees to plan.

## 2024-08-03 NOTE — PROGRESS NOTES
[facility]  [facility]  [address] [place] [id number]  Dept: [phone]  Dept Fax: [phone]  Loc: [phone]    Radha Maynard is a 43 y.o. female who presents today for her medical conditions/complaints as noted below.  Radha Maynard is complaining of Rash and Fever (Lower legs has had for a week )        HPI:   Rash  Pertinent negatives include no congestion, cough, eye pain, fatigue, fever, shortness of breath or sore throat.   Fever   Associated symptoms include a rash. Pertinent negatives include no abdominal pain, chest pain, congestion, coughing, sore throat, urinary pain or wheezing.       Radha presents to the office complaining of a rash on her lower legs.  Symptoms started one week ago.  Patient reports rash is very itchy.  Patient states she has been diagnosed with HTN in the past and will schedule an appt with PCP next week.      Past Medical History:   Diagnosis Date    Bipolar disorder (HCC)     Depression     Liver disease     Hep C    Neuropathy        Past Surgical History:   Procedure Laterality Date    APPENDECTOMY         Family History   Problem Relation Age of Onset    Liver Disease Mother         Hep C    Colon Cancer Neg Hx     Colon Polyps Neg Hx     Esophageal Cancer Neg Hx     Liver Cancer Neg Hx     Rectal Cancer Neg Hx     Stomach Cancer Neg Hx        Social History     Tobacco Use    Smoking status: Every Day     Current packs/day: 1.00     Average packs/day: 1 pack/day for 18.0 years (18.0 ttl pk-yrs)     Types: Cigarettes    Smokeless tobacco: Never   Substance Use Topics    Alcohol use: No        Current Outpatient Medications   Medication Sig Dispense Refill    triamcinolone (KENALOG) 0.025 % ointment Apply topically 2 times daily. 15 g 0    Buprenorphine HCl-Naloxone HCl (SUBOXONE SL) Place under the tongue      FLUoxetine (PROZAC) 40 MG capsule   2    lamoTRIgine (LAMICTAL) 100 MG tablet   2

## 2024-12-10 ENCOUNTER — APPOINTMENT (OUTPATIENT)
Dept: GENERAL RADIOLOGY | Facility: HOSPITAL | Age: 44
End: 2024-12-10
Payer: COMMERCIAL

## 2024-12-10 ENCOUNTER — HOSPITAL ENCOUNTER (EMERGENCY)
Facility: HOSPITAL | Age: 44
Discharge: LEFT BEFORE DISCHARGE | End: 2024-12-10
Attending: EMERGENCY MEDICINE | Admitting: EMERGENCY MEDICINE
Payer: COMMERCIAL

## 2024-12-10 ENCOUNTER — HOSPITAL ENCOUNTER (EMERGENCY)
Age: 44
Discharge: LEFT BEFORE DISCHARGE | End: 2024-12-10

## 2024-12-10 ENCOUNTER — APPOINTMENT (OUTPATIENT)
Dept: CT IMAGING | Facility: HOSPITAL | Age: 44
End: 2024-12-10
Payer: COMMERCIAL

## 2024-12-10 VITALS
WEIGHT: 177 LBS | HEIGHT: 66 IN | RESPIRATION RATE: 16 BRPM | OXYGEN SATURATION: 97 % | HEART RATE: 114 BPM | BODY MASS INDEX: 28.45 KG/M2 | TEMPERATURE: 97.2 F | SYSTOLIC BLOOD PRESSURE: 200 MMHG | DIASTOLIC BLOOD PRESSURE: 117 MMHG

## 2024-12-10 DIAGNOSIS — R11.2 NAUSEA AND VOMITING, UNSPECIFIED VOMITING TYPE: ICD-10-CM

## 2024-12-10 DIAGNOSIS — J18.9 PNEUMONIA OF RIGHT UPPER LOBE DUE TO INFECTIOUS ORGANISM: ICD-10-CM

## 2024-12-10 DIAGNOSIS — I10 PRIMARY HYPERTENSION: ICD-10-CM

## 2024-12-10 DIAGNOSIS — E86.0 DEHYDRATION: Primary | ICD-10-CM

## 2024-12-10 LAB
ALBUMIN SERPL-MCNC: 4.9 G/DL (ref 3.5–5.2)
ALBUMIN/GLOB SERPL: 1.6 G/DL
ALP SERPL-CCNC: 113 U/L (ref 39–117)
ALT SERPL W P-5'-P-CCNC: 23 U/L (ref 1–33)
ANION GAP SERPL CALCULATED.3IONS-SCNC: 14 MMOL/L (ref 5–15)
AST SERPL-CCNC: 34 U/L (ref 1–32)
B PARAPERT DNA SPEC QL NAA+PROBE: NOT DETECTED
B PERT DNA SPEC QL NAA+PROBE: NOT DETECTED
B-HCG UR QL: NEGATIVE
BACTERIA UR QL AUTO: ABNORMAL /HPF
BASOPHILS # BLD AUTO: 0.05 10*3/MM3 (ref 0–0.2)
BASOPHILS NFR BLD AUTO: 0.4 % (ref 0–1.5)
BILIRUB SERPL-MCNC: 0.9 MG/DL (ref 0–1.2)
BILIRUB UR QL STRIP: ABNORMAL
BUN SERPL-MCNC: 10 MG/DL (ref 6–20)
BUN/CREAT SERPL: 16.4 (ref 7–25)
C PNEUM DNA NPH QL NAA+NON-PROBE: NOT DETECTED
CALCIUM SPEC-SCNC: 9.4 MG/DL (ref 8.6–10.5)
CHLORIDE SERPL-SCNC: 99 MMOL/L (ref 98–107)
CLARITY UR: CLEAR
CO2 SERPL-SCNC: 23 MMOL/L (ref 22–29)
COLOR UR: ABNORMAL
CREAT SERPL-MCNC: 0.61 MG/DL (ref 0.57–1)
DEPRECATED RDW RBC AUTO: 44.1 FL (ref 37–54)
EGFRCR SERPLBLD CKD-EPI 2021: 114.6 ML/MIN/1.73
EOSINOPHIL # BLD AUTO: 0.02 10*3/MM3 (ref 0–0.4)
EOSINOPHIL NFR BLD AUTO: 0.2 % (ref 0.3–6.2)
ERYTHROCYTE [DISTWIDTH] IN BLOOD BY AUTOMATED COUNT: 13.5 % (ref 12.3–15.4)
EXPIRATION DATE: NORMAL
FLUAV SUBTYP SPEC NAA+PROBE: NOT DETECTED
FLUBV RNA ISLT QL NAA+PROBE: NOT DETECTED
GLOBULIN UR ELPH-MCNC: 3 GM/DL
GLUCOSE SERPL-MCNC: 169 MG/DL (ref 65–99)
GLUCOSE UR STRIP-MCNC: NEGATIVE MG/DL
HADV DNA SPEC NAA+PROBE: NOT DETECTED
HCOV 229E RNA SPEC QL NAA+PROBE: NOT DETECTED
HCOV HKU1 RNA SPEC QL NAA+PROBE: NOT DETECTED
HCOV NL63 RNA SPEC QL NAA+PROBE: NOT DETECTED
HCOV OC43 RNA SPEC QL NAA+PROBE: NOT DETECTED
HCT VFR BLD AUTO: 48.1 % (ref 34–46.6)
HGB BLD-MCNC: 16.1 G/DL (ref 12–15.9)
HGB UR QL STRIP.AUTO: ABNORMAL
HMPV RNA NPH QL NAA+NON-PROBE: NOT DETECTED
HPIV1 RNA ISLT QL NAA+PROBE: NOT DETECTED
HPIV2 RNA SPEC QL NAA+PROBE: NOT DETECTED
HPIV3 RNA NPH QL NAA+PROBE: NOT DETECTED
HPIV4 P GENE NPH QL NAA+PROBE: NOT DETECTED
HYALINE CASTS UR QL AUTO: ABNORMAL /LPF
IMM GRANULOCYTES # BLD AUTO: 0.06 10*3/MM3 (ref 0–0.05)
IMM GRANULOCYTES NFR BLD AUTO: 0.5 % (ref 0–0.5)
INTERNAL NEGATIVE CONTROL: NEGATIVE
INTERNAL POSITIVE CONTROL: POSITIVE
KETONES UR QL STRIP: ABNORMAL
LEUKOCYTE ESTERASE UR QL STRIP.AUTO: ABNORMAL
LIPASE SERPL-CCNC: 19 U/L (ref 13–60)
LYMPHOCYTES # BLD AUTO: 1.93 10*3/MM3 (ref 0.7–3.1)
LYMPHOCYTES NFR BLD AUTO: 14.5 % (ref 19.6–45.3)
Lab: NORMAL
M PNEUMO IGG SER IA-ACNC: NOT DETECTED
MCH RBC QN AUTO: 29.8 PG (ref 26.6–33)
MCHC RBC AUTO-ENTMCNC: 33.5 G/DL (ref 31.5–35.7)
MCV RBC AUTO: 89.1 FL (ref 79–97)
MONOCYTES # BLD AUTO: 0.6 10*3/MM3 (ref 0.1–0.9)
MONOCYTES NFR BLD AUTO: 4.5 % (ref 5–12)
NEUTROPHILS NFR BLD AUTO: 10.65 10*3/MM3 (ref 1.7–7)
NEUTROPHILS NFR BLD AUTO: 79.9 % (ref 42.7–76)
NITRITE UR QL STRIP: NEGATIVE
NRBC BLD AUTO-RTO: 0 /100 WBC (ref 0–0.2)
PH UR STRIP.AUTO: 6 [PH] (ref 5–8)
PLATELET # BLD AUTO: 304 10*3/MM3 (ref 140–450)
PMV BLD AUTO: 9.2 FL (ref 6–12)
POTASSIUM SERPL-SCNC: 3.4 MMOL/L (ref 3.5–5.2)
PROT SERPL-MCNC: 7.9 G/DL (ref 6–8.5)
PROT UR QL STRIP: ABNORMAL
RBC # BLD AUTO: 5.4 10*6/MM3 (ref 3.77–5.28)
RBC # UR STRIP: ABNORMAL /HPF
REF LAB TEST METHOD: ABNORMAL
RHINOVIRUS RNA SPEC NAA+PROBE: NOT DETECTED
RSV RNA NPH QL NAA+NON-PROBE: NOT DETECTED
S PYO AG THROAT QL: NEGATIVE
SARS-COV-2 RNA RESP QL NAA+PROBE: NOT DETECTED
SODIUM SERPL-SCNC: 136 MMOL/L (ref 136–145)
SP GR UR STRIP: 1.03 (ref 1–1.03)
SQUAMOUS #/AREA URNS HPF: ABNORMAL /HPF
TROPONIN T SERPL HS-MCNC: 9 NG/L
UROBILINOGEN UR QL STRIP: ABNORMAL
WBC # UR STRIP: ABNORMAL /HPF
WBC NRBC COR # BLD AUTO: 13.31 10*3/MM3 (ref 3.4–10.8)

## 2024-12-10 PROCEDURE — 99285 EMERGENCY DEPT VISIT HI MDM: CPT

## 2024-12-10 PROCEDURE — 74177 CT ABD & PELVIS W/CONTRAST: CPT

## 2024-12-10 PROCEDURE — 25510000001 IOPAMIDOL PER 1 ML: Performed by: EMERGENCY MEDICINE

## 2024-12-10 PROCEDURE — 87880 STREP A ASSAY W/OPTIC: CPT | Performed by: EMERGENCY MEDICINE

## 2024-12-10 PROCEDURE — 87081 CULTURE SCREEN ONLY: CPT | Performed by: EMERGENCY MEDICINE

## 2024-12-10 PROCEDURE — 81001 URINALYSIS AUTO W/SCOPE: CPT | Performed by: EMERGENCY MEDICINE

## 2024-12-10 PROCEDURE — 85025 COMPLETE CBC W/AUTO DIFF WBC: CPT | Performed by: EMERGENCY MEDICINE

## 2024-12-10 PROCEDURE — 71045 X-RAY EXAM CHEST 1 VIEW: CPT

## 2024-12-10 PROCEDURE — 83690 ASSAY OF LIPASE: CPT | Performed by: EMERGENCY MEDICINE

## 2024-12-10 PROCEDURE — 0202U NFCT DS 22 TRGT SARS-COV-2: CPT | Performed by: EMERGENCY MEDICINE

## 2024-12-10 PROCEDURE — 96374 THER/PROPH/DIAG INJ IV PUSH: CPT

## 2024-12-10 PROCEDURE — 25010000002 LORAZEPAM PER 2 MG: Performed by: EMERGENCY MEDICINE

## 2024-12-10 PROCEDURE — 93005 ELECTROCARDIOGRAM TRACING: CPT | Performed by: EMERGENCY MEDICINE

## 2024-12-10 PROCEDURE — 80053 COMPREHEN METABOLIC PANEL: CPT | Performed by: EMERGENCY MEDICINE

## 2024-12-10 PROCEDURE — 81025 URINE PREGNANCY TEST: CPT | Performed by: EMERGENCY MEDICINE

## 2024-12-10 PROCEDURE — 25010000002 ONDANSETRON PER 1 MG: Performed by: EMERGENCY MEDICINE

## 2024-12-10 PROCEDURE — 96375 TX/PRO/DX INJ NEW DRUG ADDON: CPT

## 2024-12-10 PROCEDURE — 71275 CT ANGIOGRAPHY CHEST: CPT

## 2024-12-10 PROCEDURE — 25010000002 HYDRALAZINE PER 20 MG: Performed by: EMERGENCY MEDICINE

## 2024-12-10 PROCEDURE — 25810000003 SODIUM CHLORIDE 0.9 % SOLUTION: Performed by: EMERGENCY MEDICINE

## 2024-12-10 PROCEDURE — 84484 ASSAY OF TROPONIN QUANT: CPT | Performed by: EMERGENCY MEDICINE

## 2024-12-10 RX ORDER — CLONIDINE HYDROCHLORIDE 0.1 MG/1
0.1 TABLET ORAL ONCE
Status: COMPLETED | OUTPATIENT
Start: 2024-12-10 | End: 2024-12-10

## 2024-12-10 RX ORDER — HYDRALAZINE HYDROCHLORIDE 20 MG/ML
20 INJECTION INTRAMUSCULAR; INTRAVENOUS ONCE
Status: COMPLETED | OUTPATIENT
Start: 2024-12-10 | End: 2024-12-10

## 2024-12-10 RX ORDER — SODIUM CHLORIDE 0.9 % (FLUSH) 0.9 %
10 SYRINGE (ML) INJECTION AS NEEDED
Status: DISCONTINUED | OUTPATIENT
Start: 2024-12-10 | End: 2024-12-10 | Stop reason: HOSPADM

## 2024-12-10 RX ORDER — ONDANSETRON 2 MG/ML
4 INJECTION INTRAMUSCULAR; INTRAVENOUS ONCE
Status: COMPLETED | OUTPATIENT
Start: 2024-12-10 | End: 2024-12-10

## 2024-12-10 RX ORDER — AMOXICILLIN 500 MG/1
1000 CAPSULE ORAL 3 TIMES DAILY
Qty: 42 CAPSULE | Refills: 0 | Status: SHIPPED | OUTPATIENT
Start: 2024-12-10 | End: 2024-12-17

## 2024-12-10 RX ORDER — LORAZEPAM 2 MG/ML
1 INJECTION INTRAMUSCULAR ONCE
Status: COMPLETED | OUTPATIENT
Start: 2024-12-10 | End: 2024-12-10

## 2024-12-10 RX ORDER — IOPAMIDOL 755 MG/ML
100 INJECTION, SOLUTION INTRAVASCULAR
Status: COMPLETED | OUTPATIENT
Start: 2024-12-10 | End: 2024-12-10

## 2024-12-10 RX ORDER — ONDANSETRON 4 MG/1
4 TABLET, ORALLY DISINTEGRATING ORAL EVERY 8 HOURS PRN
Qty: 12 TABLET | Refills: 0 | Status: SHIPPED | OUTPATIENT
Start: 2024-12-10

## 2024-12-10 RX ORDER — AZITHROMYCIN 250 MG/1
TABLET, FILM COATED ORAL
Qty: 6 TABLET | Refills: 0 | Status: SHIPPED | OUTPATIENT
Start: 2024-12-10

## 2024-12-10 RX ADMIN — LORAZEPAM 1 MG: 2 INJECTION INTRAMUSCULAR; INTRAVENOUS at 09:39

## 2024-12-10 RX ADMIN — SODIUM CHLORIDE 1000 ML: 9 INJECTION, SOLUTION INTRAVENOUS at 08:23

## 2024-12-10 RX ADMIN — HYDRALAZINE HYDROCHLORIDE 20 MG: 20 INJECTION, SOLUTION INTRAMUSCULAR; INTRAVENOUS at 09:41

## 2024-12-10 RX ADMIN — ONDANSETRON 4 MG: 2 INJECTION INTRAMUSCULAR; INTRAVENOUS at 08:22

## 2024-12-10 RX ADMIN — CLONIDINE HYDROCHLORIDE 0.1 MG: 0.1 TABLET ORAL at 08:21

## 2024-12-10 RX ADMIN — IOPAMIDOL 100 ML: 755 INJECTION, SOLUTION INTRAVENOUS at 09:50

## 2024-12-10 NOTE — ED PROVIDER NOTES
Subjective   History of Present Illness  Patient is a 44-year-old female with a history of bipolar who presents to the ER with multiple complaints.  Patient states she has not felt well for the last 10 days.  Patient states she has had chills, diaphoresis, sore throat, productive cough, dizziness, nausea vomiting and hoarseness.  Patient has been taking Imelda-[place] and Mucinex at home with no improvement.  Patient states her son has similar symptoms.  Patient has been unable to take her home medications due to the vomiting.  She denies any fever, chest pain, shortness of air, abdominal pain, diarrhea, urinary changes, neurologic changes.      Review of Systems   Constitutional:  Positive for chills and diaphoresis.   HENT:  Positive for sore throat.    Eyes: Negative.    Respiratory:  Positive for cough.    Cardiovascular: Negative.    Gastrointestinal:  Positive for nausea and vomiting.   Endocrine: Negative.    Genitourinary: Negative.    Musculoskeletal: Negative.    Skin: Negative.    Allergic/Immunologic: Negative.    Neurological:  Positive for dizziness.   Hematological: Negative.    Psychiatric/Behavioral: Negative.     All other systems reviewed and are negative.      Past Medical History:   Diagnosis Date    Anxiety     Bipolar affective disorder     Depression     Gestational hypertension     Hepatitis C     Kidney infection     PTSD (post-traumatic stress disorder)        No Known Allergies    Past Surgical History:   Procedure Laterality Date    APPENDECTOMY      CYSTOSCOPY W/ URETERAL STENT PLACEMENT Left 10/6/2018    Procedure: CYSTOSCOPY LEFT URETERAL STENT INSERTION;  Surgeon: Tyrone Machuca MD;  Location: [facility] OR;  Service: Urology    TONSILLECTOMY      URETEROSCOPY LASER LITHOTRIPSY WITH STENT INSERTION Left 10/17/2018    Procedure: URETEROSCOPY LASER LITHOTRIPSY WITH STENT EXCHANGE STONE EXTRACTION;  Surgeon: Tyrone Machuca MD;  Location: [facility] OR;  Service: Urology     VENTRAL/INCISIONAL HERNIA REPAIR N/A 2/11/2023    Procedure: INCARCERATED VENTRAL HERNIA REPAIR;  Surgeon: Mercedez Salgado MD;  Location: [facility] OR;  Service: General;  Laterality: N/A;       Family History   Problem Relation Age of Onset    Mental illness Mother     Drug abuse Mother        Social History     Socioeconomic History    Marital status: Significant Other     Spouse name: JOVON SCHREIBER   Tobacco Use    Smoking status: Every Day     Current packs/day: 0.25     Average packs/day: 0.3 packs/day for 20.0 years (5.0 ttl pk-yrs)     Types: Cigarettes    Smokeless tobacco: Never   Vaping Use    Vaping status: Never Used   Substance and Sexual Activity    Alcohol use: No    Drug use: Not Currently    Sexual activity: Yes     Partners: Male     Birth control/protection: None           Objective   Physical Exam  Vitals and nursing note reviewed.   Constitutional:       Appearance: She is well-developed.   HENT:      Head: Normocephalic and atraumatic.      Mouth/Throat:      Mouth: Mucous membranes are moist.      Pharynx: Posterior oropharyngeal erythema present. No oropharyngeal exudate.   Eyes:      Conjunctiva/sclera: Conjunctivae normal.      Pupils: Pupils are equal, round, and reactive to light.   Cardiovascular:      Rate and Rhythm: Regular rhythm. Tachycardia present.      Heart sounds: Normal heart sounds.   Pulmonary:      Effort: Pulmonary effort is normal.      Breath sounds: Normal breath sounds.   Abdominal:      Palpations: Abdomen is soft.      Tenderness: There is no abdominal tenderness.   Musculoskeletal:         General: No deformity. Normal range of motion.      Cervical back: Normal range of motion.   Skin:     General: Skin is warm.   Neurological:      General: No focal deficit present.      Mental Status: She is alert and oriented to person, place, and time.   Psychiatric:         Mood and Affect: Mood is anxious.         Behavior: Behavior normal.         Procedures           ED  Course      EKG as interpreted by me sinus tachycardia with a rate of 112, no acute ischemia or infarction                                       Lab Results (last 24 hours)       Procedure Component Value Units Date/Time    Urinalysis With Culture If Indicated - Urine, Clean Catch [[id number]]  (Abnormal) Collected: 12/10/24 0802    Specimen: Urine, Clean Catch Updated: 12/10/24 0835     Color, UA Dark Yellow     Appearance, UA Clear     pH, UA 6.0     Specific Gravity, UA 1.028     Glucose, UA Negative     Ketones, UA 80 mg/dL (3+)     Bilirubin, UA Small (1+)     Blood, UA Small (1+)     Protein, UA >=300 mg/dL (3+)     Leuk Esterase, UA Trace     Nitrite, UA Negative     Urobilinogen, UA 1.0 E.U./dL    Narrative:      In absence of clinical symptoms, the presence of pyuria, bacteria, and/or nitrites on the urinalysis result does not correlate with infection.    Urinalysis, Microscopic Only - Urine, Clean Catch [[id number]]  (Abnormal) Collected: 12/10/24 0802    Specimen: Urine, Clean Catch Updated: 12/10/24 0835     RBC, UA 6-10 /HPF      WBC, UA 0-2 /HPF      Comment: Urine culture not indicated.        Bacteria, UA None Seen /HPF      Squamous Epithelial Cells, UA 0-2 /HPF      Hyaline Casts, UA 3-6 /LPF      Methodology Automated Microscopy    CBC & Differential [[id number]]  (Abnormal) Collected: 12/10/24 0814    Specimen: Blood Updated: 12/10/24 0832    Narrative:      The following orders were created for panel order CBC & Differential.  Procedure                               Abnormality         Status                     ---------                               -----------         ------                     CBC Auto Differential[[id number]]        Abnormal            Final result                 Please view results for these tests on the individual orders.    Comprehensive Metabolic Panel [[id number]]  (Abnormal) Collected: 12/10/24 0814    Specimen: Blood Updated: 12/10/24 0853     Glucose 169 mg/dL      BUN  10 mg/dL      Creatinine 0.61 mg/dL      Sodium 136 mmol/L      Potassium 3.4 mmol/L      Chloride 99 mmol/L      CO2 23.0 mmol/L      Calcium 9.4 mg/dL      Total Protein 7.9 g/dL      Albumin 4.9 g/dL      ALT (SGPT) 23 U/L      AST (SGOT) 34 U/L      Alkaline Phosphatase 113 U/L      Total Bilirubin 0.9 mg/dL      Globulin 3.0 gm/dL      A/G Ratio 1.6 g/dL      BUN/Creatinine Ratio 16.4     Anion Gap 14.0 mmol/L      eGFR 114.6 mL/min/1.73     Narrative:      GFR Categories in Chronic Kidney Disease (CKD)      GFR Category          GFR (mL/min/1.73)    Interpretation  G1                     90 or greater         Normal or high (1)  G2                      60-89                Mild decrease (1)  G3a                   45-59                Mild to moderate decrease  G3b                   30-44                Moderate to severe decrease  G4                    15-29                Severe decrease  G5                    14 or less           Kidney failure          (1)In the absence of evidence of kidney disease, neither GFR category G1 or G2 fulfill the criteria for CKD.    eGFR calculation 2021 CKD-EPI creatinine equation, which does not include race as a factor    Lipase [[id number]]  (Normal) Collected: 12/10/24 0814    Specimen: Blood Updated: 12/10/24 0848     Lipase 19 U/L     CBC Auto Differential [[id number]]  (Abnormal) Collected: 12/10/24 0814    Specimen: Blood Updated: 12/10/24 0832     WBC 13.31 10*3/mm3      RBC 5.40 10*6/mm3      Hemoglobin 16.1 g/dL      Hematocrit 48.1 %      MCV 89.1 fL      MCH 29.8 pg      MCHC 33.5 g/dL      RDW 13.5 %      RDW-SD 44.1 fl      MPV 9.2 fL      Platelets 304 10*3/mm3      Neutrophil % 79.9 %      Lymphocyte % 14.5 %      Monocyte % 4.5 %      Eosinophil % 0.2 %      Basophil % 0.4 %      Immature Grans % 0.5 %      Neutrophils, Absolute 10.65 10*3/mm3      Lymphocytes, Absolute 1.93 10*3/mm3      Monocytes, Absolute 0.60 10*3/mm3      Eosinophils, Absolute 0.02  10*3/mm3      Basophils, Absolute 0.05 10*3/mm3      Immature Grans, Absolute 0.06 10*3/mm3      nRBC 0.0 /100 WBC     High Sensitivity Troponin T [[id number]]  (Normal) Collected: 12/10/24 0814    Specimen: Blood Updated: 12/10/24 1049     HS Troponin T 9 ng/L     Narrative:      High Sensitive Troponin T Reference Range:  <14.0 ng/L- Negative Female for AMI  <22.0 ng/L- Negative Male for AMI  >=14 - Abnormal Female indicating possible myocardial injury.  >=22 - Abnormal Male indicating possible myocardial injury.   Clinicians would have to utilize clinical acumen, EKG, Troponin, and serial changes to determine if it is an Acute Myocardial Infarction or myocardial injury due to an underlying chronic condition.         Respiratory Panel PCR w/COVID-19(SARS-CoV-2) JIM/ALLI/BLAIR/PAD/COR/MOHAN In-House, NP Swab in UTM/VTM, 2 HR TAT - Swab, Nasopharynx [[id number]]  (Normal) Collected: 12/10/24 0815    Specimen: Swab from Nasopharynx Updated: 12/10/24 0924     ADENOVIRUS, PCR Not Detected     Coronavirus 229E Not Detected     Coronavirus HKU1 Not Detected     Coronavirus NL63 Not Detected     Coronavirus OC43 Not Detected     COVID19 Not Detected     Human Metapneumovirus Not Detected     Human Rhinovirus/Enterovirus Not Detected     Influenza A PCR Not Detected     Influenza B PCR Not Detected     Parainfluenza Virus 1 Not Detected     Parainfluenza Virus 2 Not Detected     Parainfluenza Virus 3 Not Detected     Parainfluenza Virus 4 Not Detected     RSV, PCR Not Detected     Bordetella pertussis pcr Not Detected     Bordetella parapertussis PCR Not Detected     Chlamydophila pneumoniae PCR Not Detected     Mycoplasma pneumo by PCR Not Detected    Narrative:      In the setting of a positive respiratory panel with a viral infection PLUS a negative procalcitonin without other underlying concern for bacterial infection, consider observing off antibiotics or discontinuation of antibiotics and continue supportive care. If the  respiratory panel is positive for atypical bacterial infection (Bordetella pertussis, Chlamydophila pneumoniae, or Mycoplasma pneumoniae), consider antibiotic de-escalation to target atypical bacterial infection.    POC Urine Pregnancy [[id number]]  (Normal) Collected: 12/10/24 0816    Specimen: Urine Updated: 12/10/24 0816     HCG, Urine, QL Negative     Lot Number \[id number]\     Internal Positive Control Positive     Internal Negative Control Negative     Expiration Date \01/28/2025\    Rapid Strep A Screen - Swab, Throat [[id number]]  (Normal) Collected: 12/10/24 0825    Specimen: Swab from Throat Updated: 12/10/24 1008     Strep A Ag Negative    Beta Strep Culture, Throat - Swab, Throat [[id number]] Collected: 12/10/24 0825    Specimen: Swab from Throat Updated: 12/10/24 1008           CT Abdomen Pelvis With Contrast   Final Result       1.  No acute findings in the abdomen or pelvis.       2.  Multifocal left renal cortical scarring. 4 mm nonobstructing left   interpolar renal calculus.       3.  Colonic diverticulosis without evidence of diverticulitis.       This report was signed and finalized on 12/10/2024 10:11 AM by Dr. Javi Jaramillo MD.          CT Angiogram Chest   Final Result       1. No evidence of pulmonary embolus.       2. Patchy right upper lobe groundglass opacity, like related to an   infectious or inflammatory process.           This report was signed and finalized on 12/10/2024 10:55 AM by Dr. Javi Jaramillo MD.          XR Chest 1 View   Final Result   1. No active cardiopulmonary disease.           This report was signed and finalized on 12/10/2024 9:06 AM by Dr. Bryn Wise MD.                       Medical Decision Making  Patient is a 44-year-old female with a history of bipolar who presents to the ER with multiple complaints.  Patient states she has not felt well for the last 10 days.  Patient states she has had chills, diaphoresis, sore throat, productive cough, dizziness,  nausea vomiting and hoarseness.  Patient has been taking Imelda-[place] and Mucinex at home with no improvement.  Patient states her son has similar symptoms.  Patient has been unable to take her home medications due to the vomiting.  She denies any fever, chest pain, shortness of air, abdominal pain, diarrhea, urinary changes, neurologic changes.    Differential diagnosis: Myocardial infarction, viral syndrome, pneumonia, intra-abdominal infection, electrolyte abnormality    Patient was given IV fluids and Zofran.  Blood pressure was significantly elevated upon arrival.  Patient has not been able to take her medications due to vomiting.  She was given a dose of clonidine but pressure remained elevated.  Patient was then given hydralazine and Ativan.  Labs showed leukocytosis and an elevated H&H consistent with dehydration.  CT scan of the abdomen pelvis showed no acute findings.  Patient did have a 4 mm left renal calculus and some left renal scarring but was otherwise unremarkable.  Chest x-ray was negative.  CTA of the chest showed no evidence of pulmonary embolus.  Patient did have patchy right upper lobe groundglass opacities likely related to infectious or inflammatory process.  When I went to review the patient's results with the patient, patient had eloped from the ER.  I did call the patient on the phone and reviewed her results and informed her she has pneumonia.  I did call in amoxicillin, azithromycin and Zofran for the patient.  I advised her to take her home blood pressure medications.  I advised her to follow-up with her PCP and return to the ER immediately for any worsening or new symptoms.      Problems Addressed:  Dehydration: complicated acute illness or injury  Nausea and vomiting, unspecified vomiting type: complicated acute illness or injury  Pneumonia of right upper lobe due to infectious organism: complicated acute illness or injury  Primary hypertension: complicated acute illness or  injury    Amount and/or Complexity of Data Reviewed  Labs: ordered. Decision-making details documented in ED Course.  Radiology: ordered. Decision-making details documented in ED Course.  ECG/medicine tests: ordered. Decision-making details documented in ED Course.    Risk  Prescription drug management.        Final diagnoses:   Dehydration   Nausea and vomiting, unspecified vomiting type   Primary hypertension   Pneumonia of right upper lobe due to infectious organism       ED Disposition  ED Disposition       ED Disposition   Eloped    Condition   --    Comment   --               No follow-up provider specified.       Medication List        New Prescriptions      amoxicillin 500 MG capsule  Commonly known as: AMOXIL  Take 2 capsules by mouth 3 (Three) Times a Day for 7 days.     azithromycin 250 MG tablet  Commonly known as: Zithromax Z-Surendra  Take 2 tablets by mouth on day 1, then 1 tablet daily on days 2-5     ondansetron ODT 4 MG disintegrating tablet  Commonly known as: ZOFRAN-ODT  Take 1 tablet by mouth Every 8 (Eight) Hours As Needed for Nausea or Vomiting.               Where to Get Your Medications        These medications were sent to [facility] - [place] - [address] - [phone]  - [phone]   [address], [facility] [address]      Phone: [phone]   amoxicillin 500 MG capsule  azithromycin 250 MG tablet  ondansetron ODT 4 MG disintegrating tablet            Mary Escobar MD  12/10/24 5044

## 2024-12-12 LAB — BACTERIA SPEC AEROBE CULT: NORMAL

## 2024-12-15 LAB
QT INTERVAL: 324 MS
QTC INTERVAL: 442 MS

## 2025-01-11 ENCOUNTER — HOSPITAL ENCOUNTER (EMERGENCY)
Facility: HOSPITAL | Age: 45
Discharge: LEFT BEFORE DISCHARGE | End: 2025-01-11
Payer: COMMERCIAL

## 2025-01-11 PROCEDURE — 99211 OFF/OP EST MAY X REQ PHY/QHP: CPT

## 2025-01-24 ENCOUNTER — APPOINTMENT (OUTPATIENT)
Dept: GENERAL RADIOLOGY | Facility: HOSPITAL | Age: 45
End: 2025-01-24
Payer: COMMERCIAL

## 2025-01-24 PROCEDURE — 71045 X-RAY EXAM CHEST 1 VIEW: CPT

## 2025-02-25 ENCOUNTER — APPOINTMENT (OUTPATIENT)
Dept: CT IMAGING | Facility: HOSPITAL | Age: 45
End: 2025-02-25
Payer: MEDICAID

## 2025-02-25 ENCOUNTER — HOSPITAL ENCOUNTER (EMERGENCY)
Facility: HOSPITAL | Age: 45
Discharge: HOME | End: 2025-02-25
Admitting: FAMILY MEDICINE
Payer: MEDICAID

## 2025-02-25 ENCOUNTER — APPOINTMENT (OUTPATIENT)
Dept: ULTRASOUND IMAGING | Facility: HOSPITAL | Age: 45
End: 2025-02-25
Payer: MEDICAID

## 2025-02-25 VITALS
WEIGHT: 179 LBS | DIASTOLIC BLOOD PRESSURE: 112 MMHG | HEIGHT: 66 IN | TEMPERATURE: 98.5 F | BODY MASS INDEX: 28.77 KG/M2 | SYSTOLIC BLOOD PRESSURE: 189 MMHG | RESPIRATION RATE: 20 BRPM | OXYGEN SATURATION: 98 % | HEART RATE: 79 BPM

## 2025-02-25 DIAGNOSIS — F10.10 ALCOHOL ABUSE, DAILY USE: ICD-10-CM

## 2025-02-25 DIAGNOSIS — R11.2 NAUSEA AND VOMITING, UNSPECIFIED VOMITING TYPE: ICD-10-CM

## 2025-02-25 DIAGNOSIS — Z86.19 HISTORY OF HEPATITIS C: ICD-10-CM

## 2025-02-25 DIAGNOSIS — Z76.0 MEDICATION REFILL: Primary | ICD-10-CM

## 2025-02-25 DIAGNOSIS — R74.8 ELEVATED LIVER ENZYMES: ICD-10-CM

## 2025-02-25 DIAGNOSIS — I10 HYPERTENSION, UNSPECIFIED TYPE: ICD-10-CM

## 2025-02-25 LAB
ALBUMIN SERPL-MCNC: 4.8 G/DL (ref 3.5–5.2)
ALBUMIN/GLOB SERPL: 1.5 G/DL
ALP SERPL-CCNC: 120 U/L (ref 39–117)
ALT SERPL W P-5'-P-CCNC: 140 U/L (ref 1–33)
AMPHET+METHAMPHET UR QL: NEGATIVE
AMPHETAMINES UR QL: NEGATIVE
ANION GAP SERPL CALCULATED.3IONS-SCNC: 17 MMOL/L (ref 5–15)
AST SERPL-CCNC: 166 U/L (ref 1–32)
B-HCG UR QL: NEGATIVE
BACTERIA UR QL AUTO: ABNORMAL /HPF
BARBITURATES UR QL SCN: NEGATIVE
BASOPHILS # BLD AUTO: 0.03 10*3/MM3 (ref 0–0.2)
BASOPHILS NFR BLD AUTO: 0.5 % (ref 0–1.5)
BENZODIAZ UR QL SCN: NEGATIVE
BILIRUB SERPL-MCNC: 0.6 MG/DL (ref 0–1.2)
BILIRUB UR QL STRIP: NEGATIVE
BUN SERPL-MCNC: 6 MG/DL (ref 6–20)
BUN/CREAT SERPL: 8.8 (ref 7–25)
BUPRENORPHINE SERPL-MCNC: POSITIVE NG/ML
CALCIUM SPEC-SCNC: 9.7 MG/DL (ref 8.6–10.5)
CANNABINOIDS SERPL QL: NEGATIVE
CHLORIDE SERPL-SCNC: 99 MMOL/L (ref 98–107)
CLARITY UR: CLEAR
CO2 SERPL-SCNC: 23 MMOL/L (ref 22–29)
COCAINE UR QL: NEGATIVE
COLOR UR: YELLOW
CREAT SERPL-MCNC: 0.68 MG/DL (ref 0.57–1)
DEPRECATED RDW RBC AUTO: 47.2 FL (ref 37–54)
EGFRCR SERPLBLD CKD-EPI 2021: 111.7 ML/MIN/1.73
EOSINOPHIL # BLD AUTO: 0 10*3/MM3 (ref 0–0.4)
EOSINOPHIL NFR BLD AUTO: 0 % (ref 0.3–6.2)
ERYTHROCYTE [DISTWIDTH] IN BLOOD BY AUTOMATED COUNT: 13.4 % (ref 12.3–15.4)
EXPIRATION DATE: NORMAL
FENTANYL UR-MCNC: NEGATIVE NG/ML
GLOBULIN UR ELPH-MCNC: 3.1 GM/DL
GLUCOSE SERPL-MCNC: 142 MG/DL (ref 65–99)
GLUCOSE UR STRIP-MCNC: NEGATIVE MG/DL
HCT VFR BLD AUTO: 47.2 % (ref 34–46.6)
HGB BLD-MCNC: 15.8 G/DL (ref 12–15.9)
HGB UR QL STRIP.AUTO: NEGATIVE
HYALINE CASTS UR QL AUTO: ABNORMAL /LPF
IMM GRANULOCYTES # BLD AUTO: 0.01 10*3/MM3 (ref 0–0.05)
IMM GRANULOCYTES NFR BLD AUTO: 0.2 % (ref 0–0.5)
INTERNAL NEGATIVE CONTROL: NEGATIVE
INTERNAL POSITIVE CONTROL: POSITIVE
KETONES UR QL STRIP: ABNORMAL
LEUKOCYTE ESTERASE UR QL STRIP.AUTO: NEGATIVE
LIPASE SERPL-CCNC: 49 U/L (ref 13–60)
LYMPHOCYTES # BLD AUTO: 0.83 10*3/MM3 (ref 0.7–3.1)
LYMPHOCYTES NFR BLD AUTO: 12.8 % (ref 19.6–45.3)
Lab: NORMAL
MCH RBC QN AUTO: 31.8 PG (ref 26.6–33)
MCHC RBC AUTO-ENTMCNC: 33.5 G/DL (ref 31.5–35.7)
MCV RBC AUTO: 95 FL (ref 79–97)
METHADONE UR QL SCN: NEGATIVE
MONOCYTES # BLD AUTO: 0.19 10*3/MM3 (ref 0.1–0.9)
MONOCYTES NFR BLD AUTO: 2.9 % (ref 5–12)
NEUTROPHILS NFR BLD AUTO: 5.44 10*3/MM3 (ref 1.7–7)
NEUTROPHILS NFR BLD AUTO: 83.6 % (ref 42.7–76)
NITRITE UR QL STRIP: NEGATIVE
OPIATES UR QL: NEGATIVE
OXYCODONE UR QL SCN: NEGATIVE
PCP UR QL SCN: NEGATIVE
PH UR STRIP.AUTO: 8 [PH] (ref 5–8)
PLATELET # BLD AUTO: 115 10*3/MM3 (ref 140–450)
PMV BLD AUTO: 9.9 FL (ref 6–12)
POTASSIUM SERPL-SCNC: 3.9 MMOL/L (ref 3.5–5.2)
PROT SERPL-MCNC: 7.9 G/DL (ref 6–8.5)
PROT UR QL STRIP: ABNORMAL
RBC # BLD AUTO: 4.97 10*6/MM3 (ref 3.77–5.28)
RBC # UR STRIP: ABNORMAL /HPF
REF LAB TEST METHOD: ABNORMAL
SODIUM SERPL-SCNC: 139 MMOL/L (ref 136–145)
SP GR UR STRIP: 1.02 (ref 1–1.03)
SQUAMOUS #/AREA URNS HPF: ABNORMAL /HPF
TRICYCLICS UR QL SCN: NEGATIVE
UROBILINOGEN UR QL STRIP: ABNORMAL
WBC # UR STRIP: ABNORMAL /HPF
WBC NRBC COR # BLD AUTO: 6.5 10*3/MM3 (ref 3.4–10.8)

## 2025-02-25 PROCEDURE — 96374 THER/PROPH/DIAG INJ IV PUSH: CPT

## 2025-02-25 PROCEDURE — 81001 URINALYSIS AUTO W/SCOPE: CPT | Performed by: NURSE PRACTITIONER

## 2025-02-25 PROCEDURE — 85025 COMPLETE CBC W/AUTO DIFF WBC: CPT | Performed by: NURSE PRACTITIONER

## 2025-02-25 PROCEDURE — 25010000002 METOCLOPRAMIDE PER 10 MG: Performed by: NURSE PRACTITIONER

## 2025-02-25 PROCEDURE — 99285 EMERGENCY DEPT VISIT HI MDM: CPT

## 2025-02-25 PROCEDURE — 76705 ECHO EXAM OF ABDOMEN: CPT

## 2025-02-25 PROCEDURE — 36415 COLL VENOUS BLD VENIPUNCTURE: CPT

## 2025-02-25 PROCEDURE — 96376 TX/PRO/DX INJ SAME DRUG ADON: CPT

## 2025-02-25 PROCEDURE — 96375 TX/PRO/DX INJ NEW DRUG ADDON: CPT

## 2025-02-25 PROCEDURE — 80053 COMPREHEN METABOLIC PANEL: CPT | Performed by: NURSE PRACTITIONER

## 2025-02-25 PROCEDURE — 81025 URINE PREGNANCY TEST: CPT | Performed by: NURSE PRACTITIONER

## 2025-02-25 PROCEDURE — 80307 DRUG TEST PRSMV CHEM ANLYZR: CPT | Performed by: NURSE PRACTITIONER

## 2025-02-25 PROCEDURE — 25810000003 SODIUM CHLORIDE 0.9 % SOLUTION: Performed by: NURSE PRACTITIONER

## 2025-02-25 PROCEDURE — 25010000002 ONDANSETRON PER 1 MG: Performed by: NURSE PRACTITIONER

## 2025-02-25 PROCEDURE — 74177 CT ABD & PELVIS W/CONTRAST: CPT

## 2025-02-25 PROCEDURE — 25510000001 IOPAMIDOL 61 % SOLUTION: Performed by: NURSE PRACTITIONER

## 2025-02-25 PROCEDURE — 83690 ASSAY OF LIPASE: CPT | Performed by: NURSE PRACTITIONER

## 2025-02-25 RX ORDER — ONDANSETRON 2 MG/ML
4 INJECTION INTRAMUSCULAR; INTRAVENOUS ONCE
Status: COMPLETED | OUTPATIENT
Start: 2025-02-25 | End: 2025-02-25

## 2025-02-25 RX ORDER — SODIUM CHLORIDE 0.9 % (FLUSH) 0.9 %
10 SYRINGE (ML) INJECTION AS NEEDED
Status: DISCONTINUED | OUTPATIENT
Start: 2025-02-25 | End: 2025-02-25 | Stop reason: HOSPADM

## 2025-02-25 RX ORDER — ONDANSETRON 4 MG/1
4 TABLET, ORALLY DISINTEGRATING ORAL EVERY 6 HOURS PRN
Qty: 15 TABLET | Refills: 0 | Status: SHIPPED | OUTPATIENT
Start: 2025-02-25

## 2025-02-25 RX ORDER — METOCLOPRAMIDE HYDROCHLORIDE 5 MG/ML
10 INJECTION INTRAMUSCULAR; INTRAVENOUS ONCE
Status: COMPLETED | OUTPATIENT
Start: 2025-02-25 | End: 2025-02-25

## 2025-02-25 RX ORDER — FAMOTIDINE 10 MG/ML
20 INJECTION, SOLUTION INTRAVENOUS ONCE
Status: COMPLETED | OUTPATIENT
Start: 2025-02-25 | End: 2025-02-25

## 2025-02-25 RX ORDER — IOPAMIDOL 612 MG/ML
100 INJECTION, SOLUTION INTRAVASCULAR
Status: COMPLETED | OUTPATIENT
Start: 2025-02-25 | End: 2025-02-25

## 2025-02-25 RX ORDER — LISINOPRIL 20 MG/1
20 TABLET ORAL DAILY
Qty: 30 TABLET | Refills: 0 | Status: SHIPPED | OUTPATIENT
Start: 2025-02-25

## 2025-02-25 RX ADMIN — METOCLOPRAMIDE HYDROCHLORIDE 10 MG: 5 INJECTION INTRAMUSCULAR; INTRAVENOUS at 15:14

## 2025-02-25 RX ADMIN — ONDANSETRON 4 MG: 2 INJECTION INTRAMUSCULAR; INTRAVENOUS at 13:57

## 2025-02-25 RX ADMIN — SODIUM CHLORIDE 1000 ML: 9 INJECTION, SOLUTION INTRAVENOUS at 13:56

## 2025-02-25 RX ADMIN — FAMOTIDINE 20 MG: 10 INJECTION INTRAVENOUS at 13:57

## 2025-02-25 RX ADMIN — IOPAMIDOL 100 ML: 612 INJECTION, SOLUTION INTRAVENOUS at 17:00

## 2025-02-25 RX ADMIN — ONDANSETRON 4 MG: 2 INJECTION INTRAMUSCULAR; INTRAVENOUS at 18:57

## 2025-02-26 NOTE — DISCHARGE INSTRUCTIONS
Maintain bland diet; avoid fried or fatty foods  Recommend outpatient hida scan which may be ordered by your pcp at his/her discretion  F/u with pcp for re-evaluation of labs

## 2025-02-26 NOTE — ED PROVIDER NOTES
Subjective   History of Present Illness  Patient is a 44-year-old female who presents to the ER with chief complaints of vomiting and abdominal pain.  She states she woke up around 530 with vomiting.  She states that she has been vomiting multiple times in a row.  She has elevated blood pressure and states that she has been out of her blood pressure medication.  She complains of diffuse abdominal discomfort.  Due to symptoms described came to the ER for evaluation and treatment.  Patient denies any chest pain, diarrhea, fevers, black or bloody stools, or any other associated symptoms or modifying factors.  Past medical history significant for anxiety, bipolar, depression, gestational hypertension, hepatitis C, kidney infection, PTSD        Review of Systems   Constitutional: Negative.    HENT: Negative.     Eyes: Negative.    Respiratory: Negative.     Cardiovascular: Negative.    Gastrointestinal:  Positive for abdominal pain, nausea and vomiting. Negative for diarrhea.   Endocrine: Negative.    Genitourinary: Negative.    Musculoskeletal: Negative.    Skin: Negative.    Allergic/Immunologic: Negative.    Neurological: Negative.    Hematological: Negative.    Psychiatric/Behavioral: Negative.     All other systems reviewed and are negative.      Past Medical History:   Diagnosis Date    Anxiety     Bipolar affective disorder     Depression     Gestational hypertension     Hepatitis C     Kidney infection     PTSD (post-traumatic stress disorder)        No Known Allergies    Past Surgical History:   Procedure Laterality Date    APPENDECTOMY      CYSTOSCOPY W/ URETERAL STENT PLACEMENT Left 10/6/2018    Procedure: CYSTOSCOPY LEFT URETERAL STENT INSERTION;  Surgeon: Tyrone Machuca MD;  Location: [facility];  Service: Urology    TONSILLECTOMY      URETEROSCOPY LASER LITHOTRIPSY WITH STENT INSERTION Left 10/17/2018    Procedure: URETEROSCOPY LASER LITHOTRIPSY WITH STENT EXCHANGE STONE EXTRACTION;  Surgeon: Sven  Tyrone GORDON MD;  Location:  [facility];  Service: Urology    VENTRAL/INCISIONAL HERNIA REPAIR N/A 2/11/2023    Procedure: INCARCERATED VENTRAL HERNIA REPAIR;  Surgeon: Mercedez Salgado MD;  Location:  [facility];  Service: General;  Laterality: N/A;       Family History   Problem Relation Age of Onset    Mental illness Mother     Drug abuse Mother        Social History     Socioeconomic History    Marital status: Significant Other     Spouse name: JOVON SCHREIBER   Tobacco Use    Smoking status: Every Day     Current packs/day: 0.25     Average packs/day: 0.3 packs/day for 20.0 years (5.0 ttl pk-yrs)     Types: Cigarettes    Smokeless tobacco: Never   Vaping Use    Vaping status: Never Used   Substance and Sexual Activity    Alcohol use: No    Drug use: Not Currently    Sexual activity: Yes     Partners: Male     Birth control/protection: None           Objective   Physical Exam  Vitals and nursing note reviewed.   Constitutional:       Appearance: She is well-developed. She is ill-appearing.      Comments: Evaluated in the hallway, actively vomiting on exam   HENT:      Head: Normocephalic and atraumatic.      Right Ear: External ear normal.      Left Ear: External ear normal.      Nose: Nose normal.      Mouth/Throat:      Pharynx: Oropharynx is clear.   Eyes:      Extraocular Movements: Extraocular movements intact.      Conjunctiva/sclera: Conjunctivae normal.   Cardiovascular:      Rate and Rhythm: Normal rate and regular rhythm.      Heart sounds: Normal heart sounds.   Pulmonary:      Effort: Pulmonary effort is normal.      Breath sounds: Normal breath sounds.   Abdominal:      General: Bowel sounds are normal. There is no distension.      Palpations: Abdomen is soft. There is no mass.      Tenderness: There is abdominal tenderness. There is no right CVA tenderness, left CVA tenderness, guarding or rebound.      Hernia: No hernia is present.   Musculoskeletal:         General: Normal range of motion.      Cervical  back: Normal range of motion and neck supple.   Skin:     General: Skin is warm and dry.   Neurological:      Mental Status: She is alert and oriented to person, place, and time.   Psychiatric:         Mood and Affect: Mood normal.         Behavior: Behavior normal.         Thought Content: Thought content normal.         Judgment: Judgment normal.         Procedures           ED Course                                                       Medical Decision Making  Patient is a 44-year-old female who presents to the ER with chief complaints of vomiting and abdominal pain.  She states she woke up around 530 with vomiting.  She states that she has been vomiting multiple times in a row.  She has elevated blood pressure and states that she has been out of her blood pressure medication.  She complains of diffuse abdominal discomfort.  Due to symptoms described came to the ER for evaluation and treatment.  Patient denies any chest pain, diarrhea, fevers, black or bloody stools, or any other associated symptoms or modifying factors.  Past medical history significant for anxiety, bipolar, depression, gestational hypertension, hepatitis C, kidney infection, PTSD    Differential diagnosis includes but not limited to dehydration, colitis, diverticulitis, bowel obstruction, hyperemesis due to marijuana usage, bowel obstruction, and other etiologies    Problems Addressed:  Alcohol abuse, daily use: complicated acute illness or injury  Elevated liver enzymes: complicated acute illness or injury  History of hepatitis C: complicated acute illness or injury  Hypertension, unspecified type: complicated acute illness or injury  Medication refill: complicated acute illness or injury  Nausea and vomiting, unspecified vomiting type: complicated acute illness or injury    Amount and/or Complexity of Data Reviewed  Labs: ordered.  Radiology: ordered.    Risk  Prescription drug management.      Labs Reviewed   COMPREHENSIVE METABOLIC PANEL -  Abnormal; Notable for the following components:       Result Value    Glucose 142 (*)     ALT (SGPT) 140 (*)     AST (SGOT) 166 (*)     Alkaline Phosphatase 120 (*)     Anion Gap 17.0 (*)     All other components within normal limits    Narrative:     GFR Categories in Chronic Kidney Disease (CKD)      GFR Category          GFR (mL/min/1.73)    Interpretation  G1                     90 or greater         Normal or high (1)  G2                      60-89                Mild decrease (1)  G3a                   45-59                Mild to moderate decrease  G3b                   30-44                Moderate to severe decrease  G4                    15-29                Severe decrease  G5                    14 or less           Kidney failure          (1)In the absence of evidence of kidney disease, neither GFR category G1 or G2 fulfill the criteria for CKD.    eGFR calculation 2021 CKD-EPI creatinine equation, which does not include race as a factor   URINALYSIS W/ MICROSCOPIC IF INDICATED (NO CULTURE) - Abnormal; Notable for the following components:    Ketones, UA 40 mg/dL (2+) (*)     Protein, UA 30 mg/dL (1+) (*)     All other components within normal limits   URINE DRUG SCREEN - Abnormal; Notable for the following components:    Buprenorphine, Screen, Urine Positive (*)     All other components within normal limits    Narrative:     Cutoff For Drugs Screened:    Amphetamines               500 ng/ml  Barbiturates               200 ng/ml  Benzodiazepines            150 ng/ml  Cocaine                    150 ng/ml  Methadone                  200 ng/ml  Opiates                    100 ng/ml  Phencyclidine               25 ng/ml  THC                         50 ng/ml  Methamphetamine            500 ng/ml  Tricyclic Antidepressants  300 ng/ml  Oxycodone                  100 ng/ml  Buprenorphine               10 ng/ml    The normal value for all drugs tested is negative. This report includes unconfirmed screening  results, with the cutoff values listed, to be used for medical treatment purposes only.  Unconfirmed results must not be used for non-medical purposes such as employment or legal testing.  Clinical consideration should be applied to any drug of abuse test, particularly when unconfirmed results are used.     CBC WITH AUTO DIFFERENTIAL - Abnormal; Notable for the following components:    Hematocrit 47.2 (*)     Platelets 115 (*)     Neutrophil % 83.6 (*)     Lymphocyte % 12.8 (*)     Monocyte % 2.9 (*)     Eosinophil % 0.0 (*)     All other components within normal limits   URINALYSIS, MICROSCOPIC ONLY - Abnormal; Notable for the following components:    Squamous Epithelial Cells, UA 3-6 (*)     All other components within normal limits   LIPASE - Normal   FENTANYL, URINE - Normal    Narrative:     Negative Threshold:      Fentanyl 5 ng/mL     The normal value for the drug tested is negative. This report includes final unconfirmed screening results to be used for medical treatment purposes only. Unconfirmed results must not be used for non-medical purposes such as employment or legal testing. Clinical consideration should be applied to any drug of abuse test, particularly when unconfirmed results are used.          POCT PEFORM URINE PREGNANCY - Normal   CBC AND DIFFERENTIAL    Narrative:     The following orders were created for panel order CBC & Differential.  Procedure                               Abnormality         Status                     ---------                               -----------         ------                     CBC Auto Differential[[id number]]        Abnormal            Final result                 Please view results for these tests on the individual orders.      US Gallbladder   Final Result       1. Gallbladder appears unremarkable without echogenic sludge, shadowing   stone, or wall thickening.   2. Fatty infiltrated liver.               This report was signed and finalized on 2/25/2025 7:49  PM by Pradeep Mayorga.          CT Abdomen Pelvis With Contrast   Final Result   1. Severe diffuse fatty infiltration of the liver and hepatomegaly with   liver measuring 19 cm in length.   2. Distended gallbladder and gallbladder wall enhancement may represent   acute cholecystitis. No stone is seen.   3. Nonobstructing stones in the left kidney cortical scarring and small   left renal cyst. No ureteral calculi or hydronephrosis identified.   4. Diverticulosis of the colon without acute diverticulitis.           This report was signed and finalized on 2/25/2025 5:19 PM by Pradeep Mayorga.          Patient has elevated liver enzymes including ALT is 140, , alkaline phosphatase is 120.  Patient's white blood count is within normal limits, H&H is both stable at 15.8 and 47.2, platelets are low at 115.  Lipase is within normal limits at 49.  Urinalysis is negative for infection.  Urine drug screen is positive for buprenorphine.  Gallbladder ultrasound is negative for cholecystitis.  Patient has elevated liver enzymes.  She has history of hepatitis C and states that she took antivirals several years ago.  Offered hepatitis panel and further evaluation however patient declines at this time.  She would rather follow-up with her PCP regarding this issue.  She admits that she drinks at least a glass of whiskey daily therefore the elevated liver enzymes may be secondary to daily alcohol use.  Recommend outpatient HIDA scan and to maintain bland diet in the event her abdominal pain is secondary to gallbladder colic.  Patient has received IV fluids and antiemetics in the ER and reports feeling better on reexam.  She is wanting to be discharged.  Additionally she is requesting refill of lisinopril.  She has been out of her medication for the past 3 days.  She is not experiencing any chest pain or shortness of breath.  We will refill her blood pressure medication and recommend again a close follow-up with PCP for  reevaluation.  She will be discharged home shortly in stable condition.  Final diagnoses:   Medication refill   Hypertension, unspecified type   Nausea and vomiting, unspecified vomiting type   Elevated liver enzymes   History of hepatitis C   Alcohol abuse, daily use       ED Disposition  ED Disposition       ED Disposition   Discharge    Condition   Good    Comment   --               No follow-up provider specified.       Medication List        Changed      fluticasone 50 MCG/ACT nasal spray  Commonly known as: Flonase  2 sprays into the nostril(s) as directed by provider Daily. 2 puffs each nostril  What changed:   when to take this  reasons to take this     lisinopril 20 MG tablet  Commonly known as: PRINIVILZESTRIL  Take 1 tablet by mouth Daily.  What changed:   how much to take  how to take this     ondansetron ODT 4 MG disintegrating tablet  Commonly known as: ZOFRAN-ODT  Place 1 tablet on the tongue Every 6 (Six) Hours As Needed for Nausea.  What changed:   how to take this  when to take this  reasons to take this            Stop      predniSONE 10 MG tablet  Commonly known as: DELTASONE               Where to Get Your Medications        These medications were sent to [facility] - [place] - [address] - [phone]  - [phone]   [address], [facility] [address]      Phone: [phone]   lisinopril 20 MG tablet  ondansetron ODT 4 MG disintegrating tablet            Vira Rausch, APRN  02/26/25 0851

## 2025-04-23 ENCOUNTER — HOSPITAL ENCOUNTER (EMERGENCY)
Age: 45
Discharge: LEFT BEFORE DISCHARGE | End: 2025-04-23

## (undated) DEVICE — 4-PORT MANIFOLD: Brand: NEPTUNE 2

## (undated) DEVICE — PK CYSTO 30

## (undated) DEVICE — URETERAL ACCESS SHEATH SET: Brand: NAVIGATOR HD

## (undated) DEVICE — APPL CHLORAPREP HI/LITE 26ML ORNG

## (undated) DEVICE — PK TURNOVER CYSTO RM

## (undated) DEVICE — STRIP,CLOSURE,WOUND,MEDI-STRIP,1/2X4: Brand: MEDLINE

## (undated) DEVICE — GLV SURG BIOGEL M LTX PF 8

## (undated) DEVICE — ENDOSCOPIC SEAL URO 1 SIZE FITS ALL: Brand: ENDOSCOPIC SEAL

## (undated) DEVICE — FIBR LASR FLEXIVA 200 HIPOWR 1P/U

## (undated) DEVICE — CATH URETH FLEXIMA CONE TP 5F 70CM

## (undated) DEVICE — CATH URETRL OPN/END 5F70CM

## (undated) DEVICE — GW SENSR DUALFLEX NITNL STR .038IN 3X150CM

## (undated) DEVICE — ANTIBACTERIAL UNDYED BRAIDED (POLYGLACTIN 910), SYNTHETIC ABSORBABLE SUTURE: Brand: COATED VICRYL

## (undated) DEVICE — GOWN,NON-REINFORCED,SIRUS,SET IN SLV,XXL: Brand: MEDLINE

## (undated) DEVICE — DUAL LUMEN URETERAL CATHETER

## (undated) DEVICE — ADHS LIQ MASTISOL 2/3ML

## (undated) DEVICE — BAPTIST TURNOVER KIT: Brand: MEDLINE INDUSTRIES, INC.

## (undated) DEVICE — SUT SILK 2/0 SUTUPAK TIES 24IN SA75H

## (undated) DEVICE — CLTH CLENS READYCLEANSE PERI CARE PK/5

## (undated) DEVICE — PAD MAJOR: Brand: MEDLINE INDUSTRIES, INC.

## (undated) DEVICE — TRAP FLD MINIVAC MEGADYNE 100ML

## (undated) DEVICE — GW AMPLTZ SUPERSTIFF STR .035IN 145CM

## (undated) DEVICE — NITINOL STONE RETRIEVAL BASKET: Brand: ZERO TIP

## (undated) DEVICE — DRSNG SURESITE123 4X10IN

## (undated) DEVICE — GLV SURG BIOGEL M LTX PF 7 1/2